# Patient Record
Sex: MALE | Race: WHITE | NOT HISPANIC OR LATINO | Employment: STUDENT | URBAN - METROPOLITAN AREA
[De-identification: names, ages, dates, MRNs, and addresses within clinical notes are randomized per-mention and may not be internally consistent; named-entity substitution may affect disease eponyms.]

---
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
## 2025-01-14 ENCOUNTER — CLINICAL SUPPORT (OUTPATIENT)
Dept: REHABILITATION | Facility: HOSPITAL | Age: 20
End: 2025-01-14
Payer: COMMERCIAL

## 2025-01-14 DIAGNOSIS — M25.311 DYSKINESIS OF RIGHT SCAPULA: ICD-10-CM

## 2025-01-14 DIAGNOSIS — M62.81 MUSCLE WEAKNESS OF RIGHT UPPER EXTREMITY: Primary | ICD-10-CM

## 2025-01-14 DIAGNOSIS — G89.29 CHRONIC ELBOW PAIN, RIGHT: ICD-10-CM

## 2025-01-14 DIAGNOSIS — M25.521 CHRONIC ELBOW PAIN, RIGHT: ICD-10-CM

## 2025-01-14 PROCEDURE — 97112 NEUROMUSCULAR REEDUCATION: CPT

## 2025-01-14 PROCEDURE — 97110 THERAPEUTIC EXERCISES: CPT

## 2025-01-14 NOTE — PROGRESS NOTES
[facility]   Physical Therapy Treatment Note      Name: Ammon Jones  Clinic Number: [id number]    Therapy Diagnosis:   Encounter Diagnoses   Name Primary?    Muscle weakness of right upper extremity Yes    Dyskinesis of right scapula     Chronic elbow pain, right      Physician: Sarmad Prince DO    Visit Date: 1/14/2025    Physician Orders: PT Eval and Treat   Medical Diagnosis from Referral: M25.521,G89.29 (ICD-10-CM) - Chronic elbow pain, right   Evaluation Date: 9/30/2024  Authorization Period Expiration: 9/23/25  Plan of Care Expiration: 12/31/24  Progress Note Due: 11/15/24  Visit # / Visits authorized: 1 / 20 (21 this episode)   FOTO: 1/N     Precautions: Standard     Time In: 1500  Time Out: 1559  Total Billable Time: 54 minutes    Subjective     Pt reports: Bullpens have been no problem.     He was compliant with home exercise program.  Response to previous treatment: Ongoing  Functional change: Ongoing    Pain: Not verbalized /10  Location: right arms and elbow      Objective      Objective Measures updated at progress report unless specified.     1/14/25       Strength: (Lbs via HHD)    Right Left   Scaption 28.4 24.3   Shoulder ER at side 36.2 31.6   Shoulder ER at 90-90 38.3 30.8   Shoulder IR at side  34.9 37.0   Shoulder IR 90-90 29.5 28.9     R MT/LT MMT: 4/5  R SA MMT: 5/5    90-90 3-Jaw Madhu   R: 73 lbs  L: 69 lbs    Treatment     Ammon received the treatments listed below:        therapeutic exercises to develop strength, endurance, ROM, and flexibility for 38 minutes including:  Rower Lvl 7 x500m  Prisoner's warm up 3# x3 rounds     Updated objective measures with results as above.     Not performed:  90 deg flex eccentric hammer curl 10# 3x12   Bat pang eccentric pronation 3x10 10#   90 deg abd biceps curl 10# 3x8   Supine skull crushers 15# 3x8  Thoracic mobility series x2 rounds   Bat pang supine biceps curls 10# 3x12  1080 ER 4 KG / 6 Kg 3x12   1080  "90-90 ER 3 KG conc / 5 Kg ecc 3x10         manual therapy techniques: were applied for 00 minutes, including:      neuromuscular re-education activities to improve: Coordination, Kinesthetic, Sense, Proprioception, Posture, and Motor Control for 16 minutes. The following activities were included:  90-90 Step Back OTB 3x8 ea.   Body Blade 90-90 3x to light burn     Not performed:  D2 Pro/Sup Bat weight 2x8 ea.   MT Re-ed 5# 3x8 ea.   LT Re-ed 3x8 ea 5#  1080 D2 3Kg / 5Kg  3x10   1080 MT re-ed 4 Kg / 6 Kg 3x10  2Kg Weighted ball decel catch 3x10  1080 high row conc ER 2Kg / Eccentric 6 Kg 3x8   Hinge lat pull 20# ANCOR 3x12  1080 LT Tilt 2Kg 4Kg 3x8      therapeutic activities to improve functional performance for 00  minutes, including:        Not performed:    20" Step up w/10# DB kaden  3x8 ea.   X2 10# plate 12" step up 3x8 ea.  Landmine Row 90# 3x8   SL RDL Landmine 70# 3x8 ea.   Split stance MB de-rotation toss 3x10 ea. 20#  Heavy 1080 Ecc Row 15 Kg 20Kg 3x10   SL RDL w/3D strap 15# 3x10 20#  15# DB Head carry kaden  LOT <-> 3x      Patient Education and Home Exercises       Education provided:   - Biomechanics and principles of proximal stability for distal control      Written Home Exercises Provided: yes. Exercises were reviewed and Ammon was able to demonstrate them prior to the end of the session.  Ammon demonstrated good  understanding of the education provided. See EMR under Patient Instructions for exercises provided during therapy sessions    Assessment     Updated objective measures performed with results as above with good maintenance of shoulder strength with improvement in IR strength. He is continuing his bullpen program without adverse response. Will follow up in 2 weeks and follow till beginning to season to decrease risk for any setbacks.       Ammon Is progressing well towards his goals.   Pt prognosis is Good.     Pt will continue to benefit from skilled outpatient physical therapy to " address the deficits listed in the problem list box on initial evaluation, provide pt/family education and to maximize pt's level of independence in the home and community environment.     Pt's spiritual, cultural and educational needs considered and pt agreeable to plan of care and goals.     Anticipated barriers to physical therapy: Need for appointments around school and sport    Goals:     Short Term Goals: 2-4 weeks  1. Pt will be compliant with HEP 50% of prescribed amount. (Met)  2. The pt to demo improvement in joint mobility of the right elbow to symmetrical to uninvolved anjelica (Met)  3.  Pt will demo improvement in MT and LT strength via MMT of 1/3 grade (Met)  4. Pt will tolerate a tossing program out to 90 feet without pain or adverse response (met)     Long Term Goals: 16 weeks   Pt will be compliant with % of prescribed amount.   Pt will improve RC strength to 20% BW and 110% LSI of opposite side (Met)  Pt will improve periscapular strength by a full grade via MMT (Met)  Pt will improve 3-jaw kaden  strength to 110% LSI (Met)  Pt will progress through a return to pitching program ready for initiation of spring training (Progressing)  The pt will report full participation in ADLs and IADLs without restrictions related to R elbow/forearm.     Plan     Outpatient Physical Therapy 2 times weekly for 16 weeks to include the following interventions: Electrical Stimulation , Manual Therapy, Moist Heat/ Ice, Neuromuscular Re-ed, Patient Education, Self Care, Therapeutic Activities, Therapeutic Exercise, and Dry Needling.     Atif Nichole, PT , DPT  Board Certified in Sports Physical Therapy

## 2025-01-15 ENCOUNTER — ATHLETIC TRAINING SESSION (OUTPATIENT)
Dept: SPORTS MEDICINE | Facility: CLINIC | Age: 20
End: 2025-01-15
Payer: COMMERCIAL

## 2025-01-15 DIAGNOSIS — M75.21 BICEPS TENDINITIS, RIGHT: Primary | ICD-10-CM

## 2025-01-15 DIAGNOSIS — M54.50 ACUTE BILATERAL LOW BACK PAIN WITHOUT SCIATICA: Primary | ICD-10-CM

## 2025-01-15 NOTE — PROGRESS NOTES
Reason for Encounter Follow-Up    Subjective:       Chief Complaint: Ammon Jones is a 19 y.o. male student at [employer]) who had concerns including Pain of the Right Elbow.    Ammon is a  at [employer]. He started having elbow pain towards the end of the summer and it worsened in the fall season. He has been going to formal PT and doing rehab in the [facility].     Handedness: right-handed  Sport played: baseball      Level: college      Position:pitcher      Pain    Injury        ROS              Objective:       General: Ammon is well-developed, well-nourished, appears stated age, in no acute distress, alert and oriented to time, place and person.     AT Session          Assessment:     Status: L - Rehabilitation    Date Seen:  12/08/2024-12/14/2024    Date of Injury:  9/14/2024    Date Out:  9/20/2024    Date Cleared:  TBD        Treatment/Rehab/Maintenance:     Ammon completed therapeutic exercises to develop strength:    Date: 12/10/2024    Exercises  Sets x Reps  Weight   UE nerve flosses 2 x 10     Wrist stretches 2 x 30s     [place] ER 3 x 10  Green theraband    Chicken wings 3 x 10  Orange heavy resistance band   Band butterflies  3 x 10  Orange heavy resistance band    Clock taps  3 x 4 Blue theraband    Finger flexion  4 x 12 Green web   Finger extension  4 x 12  Green web    Cat cows 2 x 10     Windshield wipers  2 x 10     Pelvic tilts  2 x 10     Alternating camels  2 x 10                            Plan:       1. Continue rehab through out fall and winter  2. Physician Referral: yes  3. ED Referral:no  4. Parent/Guardian Notified: Yes Parent Name: Aimee Jones  Date 9/20/2024  Time: 4:00  Method of Communication: Face to face  5. All questions were answered, ath. will contact me for questions or concerns in  the interim.  6.         Eligible to use School Insurance: Yes

## 2025-01-15 NOTE — PROGRESS NOTES
Reason for Encounter Follow-Up    Subjective:       Chief Complaint: Ammon Jones is a 19 y.o. male student at [employer]) who had concerns including Pain of the Lower Back.    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 We are doing treatment and exercises to calm his back down.     Handedness: right-handed  Sport played: baseball      Level: college      Position:pitcher      Pain      ROS              Objective:       General: Ammon is well-developed, well-nourished, appears stated age, in no acute distress, alert and oriented to time, place and person.     AT Session          Assessment:     Status: F - Full Participation    Date Seen:  01/05/2025-01/11/2025    Date of Injury:  01/06/2025    Date Out:  NA    Date Cleared:  NA        Treatment/Rehab/Maintenance:   Ammon completed therapeutic exercises to develop posture and core stabilization:    Date: 01/10/2025    Exercises  Sets x Reps  Weight   Cat cows  2 x 10     Baby cobras  2 x 10     Courtney pose left and right  2 x 10     Seated twist  2 x 30s     Seated lateral reach  2 x 30s                                      Ammon completed therapeutic exercises to develop posture and core stabilization:    Date: 01/09/2025    Exercises  Sets x Reps  Weight   Cat cows  2 x 10     Baby cobras  2 x 10     Courtney pose left and right  2 x 10     Seated twist  2 x 30s     Seated lateral reach  2 x 30s                                      Ammon completed therapeutic exercises to develop posture and core stabilization:    Date: 01/07/2025    Exercises  Sets x Reps  Weight   Cat cows  2 x 10     Baby cobras  2 x 10     Courtney pose left and right  2 x 10     Seated twist  2 x 30s     Seated lateral reach  2 x 30s                                  Modality: Compex + heat   Date 01/07/2025, Pain Relief, Body Location L back, and Duration 20 min    Modality: Cupping  Date 01/07/2205, Body  Location Low back, and Duration 5 min        Plan:       1. Do treatment and exercises as needed until pain is relived and he feels better   2. Physician Referral: no  3. ED Referral:no  4. Parent/Guardian Notified: No  5. All questions were answered, ath. will contact me for questions or concerns in  the interim.  6.         Eligible to use School Insurance: Yes

## 2025-01-15 NOTE — PROGRESS NOTES
Reason for Encounter Follow-Up    Subjective:       Chief Complaint: Ammon Jones is a 19 y.o. male student at [employer]) who had concerns including Pain of the Right Elbow.    Ammon is a  at [employer]. He started having elbow pain towards the end of the summer and it worsened in the fall season. He has been going to formal PT and doing rehab in the [facility].     Handedness: right-handed  Sport played: baseball      Level: college      Position:pitcher      Pain    Injury        ROS              Objective:       General: Ammon is well-developed, well-nourished, appears stated age, in no acute distress, alert and oriented to time, place and person.     AT Session          Assessment:     Status: L - Rehabilitation    Date Seen:  01/05/2025-01/11/2025    Date of Injury:  9/14/2024    Date Out:  9/20/2024    Date Cleared:  TBD        Treatment/Rehab/Maintenance:   Ammon completed therapeutic exercises to develop strength:    Date: 01/10/2025    Exercises  Sets x Reps  Weight   Nerve flosses  2 x 10     Table wrist stretches  2 x 10     KB halos  3 x 10  10 lbs    Band row to ER and press up 3 x 10  Blue theraband    Band IR to press out  3 x 10  Blue theraband                                          Ammon completed therapeutic exercises to develop strength:    Date: 01/09/2024    Exercises  Sets x Reps  Weight   Nerve flosses  2 x 10     Table wrist stretches  2 x 10     KB halos  3 x 10  10 lbs    Band row to ER and press up 3 x 10  Blue theraband    Band IR to press out  3 x 10  Blue theraband                                  Modality: Combo  Date 01/09/2025, Ultrasound intensity 1.8, E-Stem Frequency 12, and Duration 7 min                Plan:       1. Continue rehab daily as season starts back  2. Physician Referral: yes  3. ED Referral:no  4. Parent/Guardian Notified: Yes Parent Name: Aimee Jones  Date 9/20/2024  Time: 4:00  Method of Communication: Face to face  5. All questions  were answered, ath. will contact me for questions or concerns in  the interim.  6.         Eligible to use School Insurance: Yes

## 2025-01-15 NOTE — PROGRESS NOTES
Reason for Encounter Follow-Up    Subjective:       Chief Complaint: Ammon Jones is a 19 y.o. male student at [employer]) who had concerns including Pain of the Right Elbow.    Ammon is a  at [employer]. He started having elbow pain towards the end of the summer and it worsened in the fall season. He has been going to formal PT and doing rehab in the [facility].     Handedness: right-handed  Sport played: baseball      Level: college      Position:pitcher      Pain    Injury        ROS              Objective:       General: Ammon is well-developed, well-nourished, appears stated age, in no acute distress, alert and oriented to time, place and person.     AT Session          Assessment:     Status: L - Rehabilitation    Date Seen:  12/15/2024-12/21/2024    Date of Injury:  9/14/2024    Date Out:  9/20/2024    Date Cleared:  TBD        Treatment/Rehab/Maintenance:   Ammon completed therapeutic exercises to develop strength:    Date: 12/18/2024    Exercises  Sets x Reps  Weight   Nerve flosses  2 x 10     Table wrist stretches  2 x 10     KB halos  3 x 10  10 lbs    Band row to ER and press up 3 x 10  Blue theraband    Band IR to press out  3 x 10  Blue theraband                                  Modality: Massage  Date 12/18/2024, Body location R arm, and Duration 10 min          Ammon completed therapeutic exercises to develop strength:    Date: 12/17/2024    Exercises  Sets x Reps  Weight   Nerve flosses  2 x 10     Table wrist stretches  2 x 10     KB halos  3 x 10  10 lbs    Band row to ER and press up 3 x 10  Blue theraband    Band IR to press out  3 x 10  Blue theraband    Pelvic tilts  3 x 10     Bridges w/ squeeze  3 x 10     Bridge marches  3 x 10     Table top taps  3 x 10               Modality: Cupping  Date 12/17/2024, Body Location low back, and Duration 5 min            Plan:       1. Continue rehab through out fall and winter  2. Physician Referral: yes  3. ED  Referral:no  4. Parent/Guardian Notified: Yes Parent Name: Aimee Jones  Date 9/20/2024  Time: 4:00  Method of Communication: Face to face  5. All questions were answered, ath. will contact me for questions or concerns in  the interim.  6.         Eligible to use School Insurance: Yes

## 2025-01-15 NOTE — PROGRESS NOTES
Reason for Encounter Follow-Up    Subjective:       Chief Complaint: Ammon Jones is a 19 y.o. male student at [employer]) who had concerns including Pain of the Right Elbow.    Ammon is a  at [employer]. He started having elbow pain towards the end of the summer and it worsened in the fall season. He has been going to formal PT and doing rehab in the [facility].     Handedness: right-handed  Sport played: baseball      Level: college      Position:pitcher      Pain    Injury        ROS              Objective:       General: Ammon is well-developed, well-nourished, appears stated age, in no acute distress, alert and oriented to time, place and person.     AT Session          Assessment:     Status: L - Rehabilitation    Date Seen:  01/12/2025-01/18/2025    Date of Injury:  9/14/2024    Date Out:  9/20/2024    Date Cleared:  TBD        Treatment/Rehab/Maintenance:   Ammon completed therapeutic exercises to develop strength:    Date: 01/18/2025    Exercises  Sets x Reps  Weight   UE nerve flosses  2 x 12    Wrist stretches (flexion and extension)  3 x 30s     Pronation/supination  4 x 12  8 lbs    Radial/ulnar deviation  4 x 12  8 lbs    D1 flexion  4 x 12  Blue theraband    D2 extension  4 x 12  Blue theraband    D1 extension  4 x 12  Blue theraband    D2 flexion  4 x 12  Blue theraband    Chest supported I, Y, T  4 x 8  3 lbs                    Ammon completed therapeutic exercises to develop strength:    Date: 01/17/2025    Exercises  Sets x Reps  Weight   Nerve flosses  2 x 10     Table wrist stretches  2 x 10     KB halos  3 x 10  10 lbs    Band row to ER and press up 3 x 10  Blue theraband    Band IR to press out  3 x 10  Blue theraband                                          Ammon completed therapeutic exercises to develop strength:    Date: 01/15/2024    Exercises  Sets x Reps  Weight   Nerve flosses  2 x 10     Table wrist stretches  2 x 10     KB halos  3 x 10  10 lbs    Band row  to ER and press up 3 x 10  Blue theraband    Band IR to press out  3 x 10  Blue theraband                                                Plan:       1. Continue rehab daily as season starts back  2. Physician Referral: yes  3. ED Referral:no  4. Parent/Guardian Notified: Yes Parent Name: Aimee Jones  Date 9/20/2024  Time: 4:00  Method of Communication: Face to face  5. All questions were answered, ath. will contact me for questions or concerns in  the interim.  6.         Eligible to use School Insurance: Yes

## 2025-01-16 NOTE — PLAN OF CARE
[facility]   Physical Therapy Treatment Note      Name: Ammon Jones  Clinic Number: [id number]    Therapy Diagnosis:   Encounter Diagnoses   Name Primary?    Muscle weakness of right upper extremity Yes    Dyskinesis of right scapula     Chronic elbow pain, right      Physician: Sarmad Prince DO    Visit Date: 1/14/2025    Physician Orders: PT Eval and Treat   Medical Diagnosis from Referral: M25.521,G89.29 (ICD-10-CM) - Chronic elbow pain, right   Evaluation Date: 9/30/2024  Authorization Period Expiration: 9/23/25  Plan of Care Expiration: 12/31/24  Progress Note Due: 11/15/24  Visit # / Visits authorized: 1 / 20 (21 this episode)   FOTO: 1/N     Precautions: Standard     Time In: 1500  Time Out: 1559  Total Billable Time: 54 minutes    Subjective     Pt reports: Bullpens have been no problem.     He was compliant with home exercise program.  Response to previous treatment: Ongoing  Functional change: Ongoing    Pain: Not verbalized /10  Location: right arms and elbow      Objective      Objective Measures updated at progress report unless specified.     1/14/25       Strength: (Lbs via HHD)    Right Left   Scaption 28.4 24.3   Shoulder ER at side 36.2 31.6   Shoulder ER at 90-90 38.3 30.8   Shoulder IR at side  34.9 37.0   Shoulder IR 90-90 29.5 28.9     R MT/LT MMT: 4/5  R SA MMT: 5/5    90-90 3-Jaw Madhu   R: 73 lbs  L: 69 lbs    Treatment     Ammon received the treatments listed below:        therapeutic exercises to develop strength, endurance, ROM, and flexibility for 38 minutes including:  Rower Lvl 7 x500m  Prisoner's warm up 3# x3 rounds     Updated objective measures with results as above.     Not performed:  90 deg flex eccentric hammer curl 10# 3x12   Bat pang eccentric pronation 3x10 10#   90 deg abd biceps curl 10# 3x8   Supine skull crushers 15# 3x8  Thoracic mobility series x2 rounds   Bat pang supine biceps curls 10# 3x12  1080 ER 4 KG / 6 Kg 3x12   1080  "90-90 ER 3 KG conc / 5 Kg ecc 3x10         manual therapy techniques: were applied for 00 minutes, including:      neuromuscular re-education activities to improve: Coordination, Kinesthetic, Sense, Proprioception, Posture, and Motor Control for 16 minutes. The following activities were included:  90-90 Step Back OTB 3x8 ea.   Body Blade 90-90 3x to light burn     Not performed:  D2 Pro/Sup Bat weight 2x8 ea.   MT Re-ed 5# 3x8 ea.   LT Re-ed 3x8 ea 5#  1080 D2 3Kg / 5Kg  3x10   1080 MT re-ed 4 Kg / 6 Kg 3x10  2Kg Weighted ball decel catch 3x10  1080 high row conc ER 2Kg / Eccentric 6 Kg 3x8   Hinge lat pull 20# ANCOR 3x12  1080 LT Tilt 2Kg 4Kg 3x8      therapeutic activities to improve functional performance for 00  minutes, including:        Not performed:    20" Step up w/10# DB kaden  3x8 ea.   X2 10# plate 12" step up 3x8 ea.  Landmine Row 90# 3x8   SL RDL Landmine 70# 3x8 ea.   Split stance MB de-rotation toss 3x10 ea. 20#  Heavy 1080 Ecc Row 15 Kg 20Kg 3x10   SL RDL w/3D strap 15# 3x10 20#  15# DB Head carry kaden  LOT <-> 3x      Patient Education and Home Exercises       Education provided:   - Biomechanics and principles of proximal stability for distal control      Written Home Exercises Provided: yes. Exercises were reviewed and Ammon was able to demonstrate them prior to the end of the session.  Ammon demonstrated good  understanding of the education provided. See EMR under Patient Instructions for exercises provided during therapy sessions    Assessment     Updated objective measures performed with results as above with good maintenance of shoulder strength with improvement in IR strength. He is continuing his bullpen program without adverse response. Will follow up in 2 weeks and follow till beginning to season to decrease risk for any setbacks.       Ammon Is progressing well towards his goals.   Pt prognosis is Good.     Pt will continue to benefit from skilled outpatient physical therapy to " address the deficits listed in the problem list box on initial evaluation, provide pt/family education and to maximize pt's level of independence in the home and community environment.     Pt's spiritual, cultural and educational needs considered and pt agreeable to plan of care and goals.     Anticipated barriers to physical therapy: Need for appointments around school and sport    Goals:     Short Term Goals: 2-4 weeks  1. Pt will be compliant with HEP 50% of prescribed amount. (Met)  2. The pt to demo improvement in joint mobility of the right elbow to symmetrical to uninvolved anjelica (Met)  3.  Pt will demo improvement in MT and LT strength via MMT of 1/3 grade (Met)  4. Pt will tolerate a tossing program out to 90 feet without pain or adverse response (met)     Long Term Goals: 16 weeks   Pt will be compliant with % of prescribed amount.   Pt will improve RC strength to 20% BW and 110% LSI of opposite side (Met)  Pt will improve periscapular strength by a full grade via MMT (Met)  Pt will improve 3-jaw kaden  strength to 110% LSI (Met)  Pt will progress through a return to pitching program ready for initiation of spring training (Progressing)  The pt will report full participation in ADLs and IADLs without restrictions related to R elbow/forearm.     Plan     Outpatient Physical Therapy 2 times weekly for 16 weeks to include the following interventions: Electrical Stimulation , Manual Therapy, Moist Heat/ Ice, Neuromuscular Re-ed, Patient Education, Self Care, Therapeutic Activities, Therapeutic Exercise, and Dry Needling.     Atif Nichole, PT , DPT  Board Certified in Sports Physical Therapy

## 2025-01-28 ENCOUNTER — ATHLETIC TRAINING SESSION (OUTPATIENT)
Dept: SPORTS MEDICINE | Facility: CLINIC | Age: 20
End: 2025-01-28
Payer: COMMERCIAL

## 2025-01-28 DIAGNOSIS — M25.561 ACUTE PAIN OF RIGHT KNEE: Primary | ICD-10-CM

## 2025-01-28 NOTE — PROGRESS NOTES
"Reason for Encounter New Injury    Subjective:       Chief Complaint: Ammon Jones is a 19 y.o. male student at [employer]) who had concerns including Pain of the Right Knee.    Ammon reported to the [facility] on 1/25/2025 complaining of lateral R knee pain. He states not knowing why is hurting, he cannot think of a mechanism of injury. Ammon says his knee is extremely painful and hurts to walk. He stated "This is the worst thing I've ever felt". Visually there is no bruising, swelling, or obvious deformity. Ammon does not have a history of knee pain. He is a tall and slider build, but did not have any knee issues rowing up other than a Baker's cyst. There is no pain in the anterior or medial aspect of his knee. All pain is localized to the lateral aspect of his R knee.     Handedness: right-handed  Sport played: baseball      Level: college      Position:pitcher      Pain      ROS              Objective:       General: Ammon is well-developed, well-nourished, appears stated age, in no acute distress, alert and oriented to time, place and person.         General Musculoskeletal Exam   Gait: antalgic       Right Knee Exam     Inspection   Erythema: absent  Scars: absent  Swelling: absent  Effusion: absent  Deformity: absent  Bruising: absent    Tenderness   The patient is tender to palpation of the lateral joint line, LCL, condyle and iliotibial band.    Crepitus   The patient has crepitus of the ITB.    Range of Motion   Right knee extension grade: Painful with full extension.   Right knee flexion: Painful with full flexion.     Tests   Meniscus   Garth:   Lateral - positive  Ligament Examination   Lachman: normal (-1 to 2mm)   PCL-Posterior Drawer: normal (0 to 2mm)     MCL - Valgus: normal (0 to 2mm)  LCL - Varus: normal  Posterior Sag Test: negative  Posterolateral Corner: unstable (>15 degrees difference)  Patella   Patellar apprehension: negative  Passive Patellar Tilt: neutral  Patellar " Tracking: normal    Other   Sensation: normal  Apley Grind Test: positive    Left Knee Exam   Left knee exam is normal.    Right Hip Exam     Inspection   Quadriceps Atrophy:  Negative      Muscle Strength   Right Lower Extremity   Quadriceps:  4/5 (Painful)   Hamstring:  3/5 (Painful)             Assessment:     Status: AT - Cleared to Exert    Date Seen:  01/25/2025    Date of Injury:  01/24/2025    Date Out:  01/25/2025    Date Cleared:  TBD        Treatment/Rehab/Maintenance:   Modality: Game ready  Date 01/25/2025, R knee, and Duration 15 min          Modality: Compex  Date 01/25/2025, Pain Relief, Body Location R lateral knee, and Duration 20 min          Plan:       1. As of 01/25/2025: We discussed a regiment of ibuprofen every 6 hours along with interval icing at night, 1 hour, 15 minutes on 15 minutes off. This was the plan for Saturday night- Tuesday morning. Tuesday morning when I returned to work I would re-evaluate him and we would decide if he needed to be seen. On 01/26/2025 Ammon texted me saying he was in more pain, he was having to kick his leg to walk, and stairs were bothering his knee. We discussed if it would be appropriate to get him scheduled with Dr. Waldron and agreed that was the correct decision. I also told him to continue with ibuprofen and ice as well as Quad squeezes 3 x 10, heel slides w towel 3 x 10, and leg extensions (through comfortable ROM) 3 x 10 twice a day.   2. Physician Referral: yes  3. ED Referral:no  4. Parent/Guardian Notified: Yes Parent Name: Aimee Jones  Date 01/26/2025  Time: 11:00 am  Method of Communication: Phone Call  5. All questions were answered, ath. will contact me for questions or concerns in  the interim.  6.         Eligible to use School Insurance: No, not a school related injury

## 2025-01-29 ENCOUNTER — ATHLETIC TRAINING SESSION (OUTPATIENT)
Dept: SPORTS MEDICINE | Facility: CLINIC | Age: 20
End: 2025-01-29
Payer: COMMERCIAL

## 2025-01-29 DIAGNOSIS — Z00.00 HEALTHCARE MAINTENANCE: Primary | ICD-10-CM

## 2025-01-29 NOTE — PROGRESS NOTES
Reason for Encounter N/A    Subjective:       Chief Complaint: Ammon Jones is a 19 y.o. male student at [employer]) who had concerns including Health Maintenance of the Right Upper Arm.    Ammon is a  at [employer]. He was being seen for bicep tendonitis of his R shoulder. He also has a hx scapular diskinisis of his R arm. Ammon is coming to the end of his formal PT and his arm is feeling much better. We are now moving to a maintenance program to keep him healthy for the season.     Handedness: right-handed  Sport played: baseball      Level: college      Position:pitcher          ROS              Objective:       General: Ammon is well-developed, well-nourished, appears stated age, in no acute distress, alert and oriented to time, place and person.     AT Session          Assessment:     Status: F - Full Participation    Date Seen:  01/19/2025-01/25/2025    Date of Injury:  NA    Date Out:  NA    Date Cleared:  NA        Treatment/Rehab/Maintenance:       Ammon completed therapeutic exercises to develop strength:    Date: 01/24/2025    Exercises  Sets x Reps  Weight   UE nerve flosses  2 x 12     Wrist stretches  3 x 30s     Lateral band presses 3 x 12  Blue heavy resistance band    Band snow angles  4 x 12     Scap ups  4 x 12     Plank saws  4 x 12     Band serratus scoops  4 x 12  Blue theraband                          Modality: Combo  Date 01/24/2025, Ultrasound intensity 1.8 , Ultrasound Hertz 1 MHz, E-Stem Frequency 100 Hz, and Duration 7 min          Plan:       1. Continue daily arm care  2. Physician Referral: no  3. ED Referral:no  4. Parent/Guardian Notified: No  5. All questions were answered, ath. will contact me for questions or concerns in  the interim.  6.         Eligible to use School Insurance: Yes

## 2025-01-29 NOTE — PROGRESS NOTES
"CC: Right knee pain    19 y.o. Male who returns with a new issue of right knee pain. 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  He is accompanied by his father today.  He reports a right knee injury while standing in the shower this past Friday January 24th.  He reports that his "knee just gave out" while washing his hair.  This was acutely painful.  He denies any pre-existing issues.  No antecedent trauma.  No specific fall.  No twisting mechanism.  Since then he has fairly significant pain with clicking and popping and mechanical symptoms.  This significantly concerns him.  He has pain upwards of "12/10" in severity.  He is walking with a limp.  Again no prior problems.  Nothing unusual in practice leading up to that point.  Treatment has included rest, self-directed therapy, treatment by the , oral medication, activity modifications.  No ipsilateral groin or hip pain.  No back or radicular symptoms.  No numbness or tingling.    REVIEW OF SYSTEMS:   Constitution: Negative. Negative for chills, fever and night sweats.    Hematologic/Lymphatic: Negative for bleeding problem. Does not bruise/bleed easily.   Skin: Negative for dry skin, itching and rash.   Musculoskeletal: Negative for falls. Positive for right knee pain and muscle weakness.     All other review of symptoms were reviewed and found to be noncontributory.     PAST MEDICAL HISTORY:   No past medical history on file.  PAST SURGICAL HISTORY:   Past Surgical History:   Procedure Laterality Date    ADENOIDECTOMY Bilateral     TYMPANOSTOMY TUBE PLACEMENT Bilateral      FAMILY HISTORY:   Family History   Problem Relation Name Age of Onset    No Known Problems Mother      No Known Problems Father       SOCIAL HISTORY:   Social History     Socioeconomic History    Marital status: Single   Tobacco Use    Smoking status: Never    Smokeless tobacco: Never   Substance and Sexual Activity    Alcohol use: No     Alcohol/week: 0.0 standard drinks of alcohol    Drug use: No " "    Social Drivers of Health     Financial Resource Strain: Patient Declined (9/22/2024)    Overall Financial Resource Strain (CARDIA)     Difficulty of Paying Living Expenses: Patient declined   Food Insecurity: Patient Declined (9/22/2024)    Hunger Vital Sign     Worried About Running Out of Food in the Last Year: Patient declined     Ran Out of Food in the Last Year: Patient declined   Transportation Needs: No Transportation Needs (7/29/2021)    [facility]    PRAPARE - Transportation     Lack of Transportation (Medical): No     Lack of Transportation (Non-Medical): No   Physical Activity: Sufficiently Active (9/22/2024)    Exercise Vital Sign     Days of Exercise per Week: 5 days     Minutes of Exercise per Session: 120 min   Stress: No Stress Concern Present (9/22/2024)    [employer] - Occupational Stress Questionnaire     Feeling of Stress : Not at all   Housing Stability: Unknown (9/22/2024)    Housing Stability Vital Sign     Unable to Pay for Housing in the Last Year: Patient declined     MEDICATIONS:     Current Outpatient Medications:     ibuprofen (ADVIL,MOTRIN) 200 MG tablet, Take 200 mg by mouth every 6 (six) hours as needed for Pain., Disp: , Rfl:     celecoxib (CELEBREX) 200 MG capsule, Take 1 capsule (200 mg total) by mouth once daily., Disp: 30 capsule, Rfl: 1    dextromethorphan-guaiFENesin  mg (MUCINEX DM)  mg per 12 hr tablet, Take 1 tablet by mouth every 12 (twelve) hours. (Patient not taking: Reported on 9/30/2024), Disp: , Rfl:   ALLERGIES:   Review of patient's allergies indicates:  No Known Allergies     PHYSICAL EXAMINATION:  Ht 6' 5" (1.956 m)   Wt 86.2 kg (190 lb)   BMI 22.53 kg/m²   General: Well-developed well-nourished 19 y.o. malein no acute distress   Cardiovascular: Regular rhythm by palpation of distal pulse, normal color and temperature, no concerning varicosities on symptomatic side   Lungs: No labored " breathing or wheezing appreciated   Neuro: Alert and oriented ×3   Psychiatric: well oriented to person, place and time, demonstrates normal mood and affect   Skin: No rashes, lesions or ulcers, normal temperature, turgor, and texture on involved extremity    Ortho/SPM Exam  Examination of the right knee demonstrates intact extensor mechanism.  Perhaps trace effusion.  There is some soft tissue swelling at the level of the lateral joint line and lateral knee.  This area is exquisitely tender for the patient.  The patient guards on exam.  Laterally based pain with forced flexion and extension.  Full active and passive extension.  Active assisted flexion to 120°.  Positive Garth's test for pain laterally.  Patient is guarding.  Negative posterior drawer.  Negative Lachman.  Stable to varus and valgus stress.  I do not appreciate any increased opening to varus stress at 30° compared to the contralateral side.  I am able to bring him into the figure 4 position but he does have pain laterally of the LCL and lateral joint line reason.  Negative prone dial test at 30 and 90 °.    IMAGING:  X-rays including standing, weight bearing AP and flexion bilateral knees, RIGHT knee lateral and sunrise views ordered and images reviewed by me show:    No acute abnormality    ASSESSMENT:      ICD-10-CM ICD-9-CM   1. Acute internal derangement of right knee  M23.91 717.9   2. Right knee pain  M25.561 719.46       PLAN:     Findings discussed with the patient.  He reports an injury to the right knee with the acute onset pain over the lateral joint line.  History and exam findings are concerning for a possible lateral meniscus tear.  The patient has significant pain on exam and ambulates with a limp.  This is the beginning of his baseball season so he naturally is quite concerned.  I have recommended MRI for further assessment.  I will see him back virtually afterwards to discuss results and treatment options.  Hold out of baseball  activities for now.  No running or high impact activity.  No deep squats.  Prescription given for Celebrex.    Procedures

## 2025-01-30 ENCOUNTER — CLINICAL SUPPORT (OUTPATIENT)
Dept: REHABILITATION | Facility: HOSPITAL | Age: 20
End: 2025-01-30
Payer: COMMERCIAL

## 2025-01-30 ENCOUNTER — HOSPITAL ENCOUNTER (OUTPATIENT)
Dept: RADIOLOGY | Facility: HOSPITAL | Age: 20
Discharge: HOME | End: 2025-01-30
Attending: ORTHOPAEDIC SURGERY
Payer: COMMERCIAL

## 2025-01-30 ENCOUNTER — OFFICE VISIT (OUTPATIENT)
Dept: SPORTS MEDICINE | Facility: CLINIC | Age: 20
End: 2025-01-30
Payer: COMMERCIAL

## 2025-01-30 VITALS — BODY MASS INDEX: 22.43 KG/M2 | WEIGHT: 190 LBS | HEIGHT: 77 IN

## 2025-01-30 DIAGNOSIS — M23.91 ACUTE INTERNAL DERANGEMENT OF RIGHT KNEE: Primary | ICD-10-CM

## 2025-01-30 DIAGNOSIS — M62.81 MUSCLE WEAKNESS OF RIGHT UPPER EXTREMITY: Primary | ICD-10-CM

## 2025-01-30 DIAGNOSIS — M25.311 DYSKINESIS OF RIGHT SCAPULA: ICD-10-CM

## 2025-01-30 DIAGNOSIS — M25.561 RIGHT KNEE PAIN: ICD-10-CM

## 2025-01-30 DIAGNOSIS — M25.521 CHRONIC ELBOW PAIN, RIGHT: ICD-10-CM

## 2025-01-30 DIAGNOSIS — G89.29 CHRONIC ELBOW PAIN, RIGHT: ICD-10-CM

## 2025-01-30 PROCEDURE — 97110 THERAPEUTIC EXERCISES: CPT

## 2025-01-30 PROCEDURE — 73562 X-RAY EXAM OF KNEE 3: CPT | Mod: 26,59,LT, | Performed by: RADIOLOGY

## 2025-01-30 PROCEDURE — 99214 OFFICE O/P EST MOD 30 MIN: CPT | Mod: S$GLB,,, | Performed by: ORTHOPAEDIC SURGERY

## 2025-01-30 PROCEDURE — 99999 PR PBB SHADOW E&M-EST. PATIENT-LVL III: CPT | Mod: PBBFAC,,, | Performed by: ORTHOPAEDIC SURGERY

## 2025-01-30 PROCEDURE — 73564 X-RAY EXAM KNEE 4 OR MORE: CPT | Mod: 26,RT,, | Performed by: RADIOLOGY

## 2025-01-30 PROCEDURE — 97530 THERAPEUTIC ACTIVITIES: CPT

## 2025-01-30 PROCEDURE — 97112 NEUROMUSCULAR REEDUCATION: CPT

## 2025-01-30 PROCEDURE — 3008F BODY MASS INDEX DOCD: CPT | Mod: CPTII,S$GLB,, | Performed by: ORTHOPAEDIC SURGERY

## 2025-01-30 PROCEDURE — 73564 X-RAY EXAM KNEE 4 OR MORE: CPT | Mod: TC,RT

## 2025-01-30 RX ORDER — IBUPROFEN 200 MG
200 TABLET ORAL EVERY 6 HOURS PRN
COMMUNITY

## 2025-01-30 RX ORDER — CELECOXIB 200 MG/1
200 CAPSULE ORAL DAILY
Qty: 30 CAPSULE | Refills: 1 | Status: SHIPPED | OUTPATIENT
Start: 2025-01-30

## 2025-01-30 NOTE — PROGRESS NOTES
[facility]   Physical Therapy Treatment Note      Name: Ammon Jones  Clinic Number: [id number]    Therapy Diagnosis:   Encounter Diagnoses   Name Primary?    Muscle weakness of right upper extremity Yes    Dyskinesis of right scapula     Chronic elbow pain, right      Physician: Sarmad Prince DO    Visit Date: 1/30/2025    Physician Orders: PT Eval and Treat   Medical Diagnosis from Referral: M25.521,G89.29 (ICD-10-CM) - Chronic elbow pain, right   Evaluation Date: 9/30/2024  Authorization Period Expiration: 9/23/25  Plan of Care Expiration: 12/31/24  Progress Note Due: 11/15/24  Visit # / Visits authorized: 2 / 20 (22 this episode)   FOTO: 1/N     Precautions: Standard     Time In: 0700  Time Out: 0755  Total Billable Time: 55 minutes    Subjective     Pt reports: Arm feels great. No pain. Feels good to start the season.     He was compliant with home exercise program.  Response to previous treatment: Ongoing  Functional change: Ongoing    Pain: Not verbalized /10  Location: right arms and elbow      Objective      Objective Measures updated at progress report unless specified.     1/14/25       Strength: (Lbs via HHD)    Right Left   Scaption 28.4 24.3   Shoulder ER at side 36.2 31.6   Shoulder ER at 90-90 38.3 30.8   Shoulder IR at side  34.9 37.0   Shoulder IR 90-90 29.5 28.9     R MT/LT MMT: 4/5  R SA MMT: 5/5    90-90 3-Jaw Madhu   R: 73 lbs  L: 69 lbs    Treatment     Ammon received the treatments listed below:        therapeutic exercises to develop strength, endurance, ROM, and flexibility for 13 minutes including:  Rower Lvl 7 x500m  Prisoner's warm up 3# x3 rounds       Not performed:  90 deg flex eccentric hammer curl 10# 3x12   Bat pang eccentric pronation 3x10 10#   90 deg abd biceps curl 10# 3x8   Supine skull crushers 15# 3x8  Thoracic mobility series x2 rounds   Bat pang supine biceps curls 10# 3x12  1080 ER 4 KG / 6 Kg 3x12   1080 90-90 ER 3 KG conc / 5 Kg  "ecc 3x10         manual therapy techniques: were applied for 00 minutes, including:      neuromuscular re-education activities to improve: Coordination, Kinesthetic, Sense, Proprioception, Posture, and Motor Control for 24 minutes. The following activities were included:  90-90 ER/IR BTB 3x5 ea.   Body Blade 90-90 3x to light burn   D2 BTB 3x5     Not performed:  D2 Pro/Sup Bat weight 2x8 ea.   MT Re-ed 5# 3x8 ea.   LT Re-ed 3x8 ea 5#  1080 D2 3Kg / 5Kg  3x10   1080 MT re-ed 4 Kg / 6 Kg 3x10  2Kg Weighted ball decel catch 3x10  1080 high row conc ER 2Kg / Eccentric 6 Kg 3x8   Hinge lat pull 20# ANCOR 3x12  1080 LT Tilt 2Kg 4Kg 3x8      therapeutic activities to improve functional performance for 18  minutes, including:  2x25 throwing to 60 ft with 2D video analysis.       Not performed:    20" Step up w/10# DB kaden  3x8 ea.   X2 10# plate 12" step up 3x8 ea.  Landmine Row 90# 3x8   SL RDL Landmine 70# 3x8 ea.   Split stance MB de-rotation toss 3x10 ea. 20#  Heavy 1080 Ecc Row 15 Kg 20Kg 3x10   SL RDL w/3D strap 15# 3x10 20#  15# DB Head carry kaden  LOT <-> 3x      Patient Education and Home Exercises       Education provided:   - Biomechanics and principles of proximal stability for distal control      Written Home Exercises Provided: yes. Exercises were reviewed and Ammon was able to demonstrate them prior to the end of the session.  Ammon demonstrated good  understanding of the education provided. See EMR under Patient Instructions for exercises provided during therapy sessions    Assessment     Ammon continues to demo good carry over of ROM and demo's good throwing mechanics on assessment. Will continue to progress strength to build resilience to sport and monitor in coming tx's.       Ammon Is progressing well towards his goals.   Pt prognosis is Good.     Pt will continue to benefit from skilled outpatient physical therapy to address the deficits listed in the problem list box on initial evaluation, " provide pt/family education and to maximize pt's level of independence in the home and community environment.     Pt's spiritual, cultural and educational needs considered and pt agreeable to plan of care and goals.     Anticipated barriers to physical therapy: Need for appointments around school and sport    Goals:     Short Term Goals: 2-4 weeks  1. Pt will be compliant with HEP 50% of prescribed amount. (Met)  2. The pt to demo improvement in joint mobility of the right elbow to symmetrical to uninvolved anjelica (Met)  3.  Pt will demo improvement in MT and LT strength via MMT of 1/3 grade (Met)  4. Pt will tolerate a tossing program out to 90 feet without pain or adverse response (met)     Long Term Goals: 16 weeks   Pt will be compliant with % of prescribed amount.   Pt will improve RC strength to 20% BW and 110% LSI of opposite side (Met)  Pt will improve periscapular strength by a full grade via MMT (Met)  Pt will improve 3-jaw kaden  strength to 110% LSI (Met)  Pt will progress through a return to pitching program ready for initiation of spring training (Progressing)  The pt will report full participation in ADLs and IADLs without restrictions related to R elbow/forearm.     Plan     Outpatient Physical Therapy 2 times weekly for 16 weeks to include the following interventions: Electrical Stimulation , Manual Therapy, Moist Heat/ Ice, Neuromuscular Re-ed, Patient Education, Self Care, Therapeutic Activities, Therapeutic Exercise, and Dry Needling.     Atif Nichole, PT , DPT  Board Certified in Sports Physical Therapy

## 2025-01-31 NOTE — PROGRESS NOTES
"Telemedicine/Virtual Visit Documentation:     The patient location is: home    The chief complaint leading to consultation is: see HPI    VISIT TYPE X   Virtual visit with synchronous audio and video X   Telephone E/M service      Total time spent with patient: see X papo on chart below.   More than half of the time was spent counseling or coordinating care including prognosis, differential diagnosis, risks and benefits of treatment, instructions, compliance risk reductions     EST MINUTES X   [id number] 5    [id number] 10    [id number] 15    [id number] 25 X   [id number] 40    NEW     07815 10    24143 20    [id number] 30    99666 45    [id number] 60    PHONE      5-10    10900 11-20    40816 21-30      SUBJECTIVE:     History of Present Illness:  Ammon Benjamin Robert who presents for MRI review of right knee. Concern for internal derangement. Patient does not report any new incidents or injuries since their last appointment. Pain and symptoms remain unchanged since his last appointment. Here today to discuss treatment options.  The patient denies any significant improvement in his symptoms compared to last week.    Prior Hx 1/30/2025:  19 y.o. Male who returns with a new issue of right knee pain. 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  He is accompanied by his father today.  He reports a right knee injury while standing in the shower this past Friday January 24th.  He reports that his "knee just gave out" while washing his hair.  This was acutely painful.  He denies any pre-existing issues.  No antecedent trauma.  No specific fall.  No twisting mechanism.  Since then he has fairly significant pain with clicking and popping and mechanical symptoms.  This significantly concerns him.  He has pain upwards of "12/10" in severity.  He is walking with a limp.  Again no prior problems.  Nothing unusual in practice leading up to that point.  Treatment has included rest, self-directed therapy, treatment by the , oral medication, activity modifications.  No " ipsilateral groin or hip pain.  No back or radicular symptoms.  No numbness or tingling.    Review of patient's allergies indicates:  No Known Allergies    No past medical history on file.  Past Surgical History:   Procedure Laterality Date    ADENOIDECTOMY Bilateral     TYMPANOSTOMY TUBE PLACEMENT Bilateral      Family History   Problem Relation Name Age of Onset    No Known Problems Mother      No Known Problems Father       Social History     Tobacco Use    Smoking status: Never    Smokeless tobacco: Never   Substance Use Topics    Alcohol use: No     Alcohol/week: 0.0 standard drinks of alcohol    Drug use: No      Review of Systems:  Patient denies constitutional symptoms, cardiac symptoms, respiratory symptoms, GI symptoms.  The remainder of the musculoskeletal ROS is included in the HPI.    OBJECTIVE:     Physical Exam:  Gen:  No acute distress    MSK:  MRI 02/02/25 of right knee reviewed by me and discussed with patient. Study shows:   No evidence for lateral meniscal tear or fibular collateral ligament injury.     ASSESSMENT/PLAN:     A/P: Ammon Jones is a 19 y.o. with the acute atraumatic onset right knee pain starting last week; negative MRI    Plan:  Imaging reviewed with the patient.  No discrete internal derangement or abnormality seen on MRI.  No history of known specific trauma.  A bit unusual presentation.  I have recommended initial conservative treatment.  Prescription given for a Medrol Dosepak.  May take Celebrex thereafter.  PT referral placed to [facility].  This would be a day-to-day, week 2 week type of injury to get back to sport.  Symptom based.  I have discussed this with the school .  I will see the patient back in 2 weeks to make sure he is making appropriate progress.

## 2025-02-02 ENCOUNTER — HOSPITAL ENCOUNTER (OUTPATIENT)
Dept: RADIOLOGY | Facility: HOSPITAL | Age: 20
Discharge: HOME | End: 2025-02-02
Attending: ORTHOPAEDIC SURGERY
Payer: COMMERCIAL

## 2025-02-02 DIAGNOSIS — M25.561 RIGHT KNEE PAIN: ICD-10-CM

## 2025-02-02 PROCEDURE — 73721 MRI JNT OF LWR EXTRE W/O DYE: CPT | Mod: TC,RT

## 2025-02-02 PROCEDURE — 73721 MRI JNT OF LWR EXTRE W/O DYE: CPT | Mod: 26,RT,, | Performed by: RADIOLOGY

## 2025-02-04 ENCOUNTER — ATHLETIC TRAINING SESSION (OUTPATIENT)
Dept: SPORTS MEDICINE | Facility: CLINIC | Age: 20
End: 2025-02-04
Payer: COMMERCIAL

## 2025-02-04 ENCOUNTER — OFFICE VISIT (OUTPATIENT)
Dept: SPORTS MEDICINE | Facility: CLINIC | Age: 20
End: 2025-02-04
Payer: COMMERCIAL

## 2025-02-04 DIAGNOSIS — Z00.00 HEALTHCARE MAINTENANCE: Primary | ICD-10-CM

## 2025-02-04 DIAGNOSIS — M25.561 ACUTE PAIN OF RIGHT KNEE: Primary | ICD-10-CM

## 2025-02-04 PROCEDURE — 98006 SYNCH AUDIO-VIDEO EST MOD 30: CPT | Mod: 95,,, | Performed by: ORTHOPAEDIC SURGERY

## 2025-02-04 RX ORDER — METHYLPREDNISOLONE 4 MG/1
TABLET ORAL
Qty: 21 EACH | Refills: 0 | Status: SHIPPED | OUTPATIENT
Start: 2025-02-04

## 2025-02-04 RX ORDER — CELECOXIB 200 MG/1
200 CAPSULE ORAL DAILY
Qty: 30 CAPSULE | Refills: 1 | Status: SHIPPED | OUTPATIENT
Start: 2025-02-04

## 2025-02-04 NOTE — PROGRESS NOTES
Reason for Encounter Follow-Up    Subjective:       Chief Complaint: Ammon Jones is a 19 y.o. male student at [employer]) who had concerns including Health Maintenance of the Right Upper Arm.    Ammon is a  at [employer]. He started having elbow pain towards the end of the summer and it worsened in the fall season. He has been going to formal PT and doing rehab in the [facility].     Handedness: right-handed  Sport played: baseball      Level: college      Position:pitcher      Pain    Injury        ROS              Objective:       General: Ammon is well-developed, well-nourished, appears stated age, in no acute distress, alert and oriented to time, place and person.     AT Session          Assessment:     Status: F - Full Participation    Date Seen:  01/26/2025-02/01/2025    Date of Injury:  NA    Date Out:  NA    Date Cleared:  NA        Treatment/Rehab/Maintenance:   Ammon completed therapeutic exercises to develop strength:    Date: 01/29/2025    Exercises  Sets x Reps  Weight   UE nerve flosses  2 x 12    Wrist stretches (flexion and extension)  3 x 30s     Pronation/supination  4 x 12  8 lbs    Radial/ulnar deviation  4 x 12  8 lbs    D1 flexion  4 x 12  Blue theraband    D2 extension  4 x 12  Blue theraband    D1 extension  4 x 12  Blue theraband    D2 flexion  4 x 12  Blue theraband    Chest supported I, Y, T  4 x 8  3 lbs                            Plan:       1. Daily arm care work to keep arm healthy during season.   2. Physician Referral: no  3. ED Referral:no  4. Parent/Guardian Notified: No  5. All questions were answered, ath. will contact me for questions or concerns in  the interim.  6.         Eligible to use School Insurance: Yes

## 2025-02-04 NOTE — PROGRESS NOTES
Reason for Encounter Follow-Up    Subjective:       Chief Complaint: Ammon Jones is a 19 y.o. male student at [employer]) who had concerns including Injury of the Right Knee.    Ammon reported R knee pain a few weeks ago. He presented similar to a lateral meniscus tear, but had no mechanism of injury. I sent him to see Dr. Waldron to make sure there is nothing torn and to find out if there were any other underlying issues. Until his follow up we are doing light rehab, mainly focusing on mobility and keeping his knee from getting stiff.     Handedness: right-handed  Sport played: baseball      Level: college      Position:pitcher      Injury        ROS              Objective:       General: Ammon is well-developed, well-nourished, appears stated age, in no acute distress, alert and oriented to time, place and person.     AT Session          Assessment:     Status: O - Out    Date Seen:  01/26/2025-02/01/2025    Date of Injury:  01/18/2025    Date Out:  01/23/2025    Date Cleared:  NA        Treatment/Rehab/Maintenance:   Ammon completed therapeutic exercises to develop ROM:    Date: 02/01/2025    Exercises  Sets x Reps  Weight   Towel slides 3 x 6    Quad squeezes  3 x 10     Short arc quad  3 x 10               Modality: Combo  Date 02/01/2025, Ultrasound Hertz 1.8, E-Stem Frequency 17.5, and Duration 7 min IF waveform    Ammon completed therapeutic exercises to develop ROM:    Date: 01/31/2025    Exercises  Sets x Reps  Weight   Towel slides 3 x 6    Quad squeezes  3 x 10     Short arc quad  3 x 10                   Ammon completed therapeutic exercises to develop ROM:    Date: 01/30/2025    Exercises  Sets x Reps  Weight   Towel slides 3 x 6    Quad squeezes  3 x 10     Short arc quad  3 x 10               Modality: Combo  Date 01/30/2025, Ultrasound Hertz 1.8, E-Stem Frequency 17.5, and Duration 7 min IF waveform    Ammon completed therapeutic exercises to develop ROM:    Date:  01/29/2025    Exercises  Sets x Reps  Weight   Towel slides 3 x 6    Quad squeezes  3 x 10     Short arc quad  3 x 10               Modality: Combo  Date 01/29/2025, Ultrasound Hertz 1.8, E-Stem Frequency 17.5, and Duration 7 min  IF waveform  Modality: Normatec  Date 01/29/2025, Legs, and Duration 7 minutes    Hot bath with epsom salt: 15 min         Ammon completed therapeutic exercises to develop ROM:    Date: 01/28/2025    Exercises  Sets x Reps  Weight   Towel slides 3 x 6    Quad squeezes  3 x 10     Short arc quad  3 x 10     Long arc qaud  3 x 8          Modality: Combo  Date 01/28/2025, Ultrasound Hertz 1.8, E-Stem Frequency 12, and Duration 7 min  Pre-mod wave form         Plan:       1. Daily rehab and treatment to keep knee moving and reduce pain. Will create a new plan after follow up with Dr. Waldron.  2. Physician Referral: yes  3. ED Referral:no  4. Parent/Guardian Notified: Yes Parent Name: Aimee Jones  Date 01/26/2025  Time: 11:00 am  Method of Communication: Phone Call  5. All questions were answered, ath. will contact me for questions or concerns in  the interim.  6.         Eligible to use School Insurance: No, not a school related injury

## 2025-02-11 ENCOUNTER — ATHLETIC TRAINING SESSION (OUTPATIENT)
Dept: SPORTS MEDICINE | Facility: CLINIC | Age: 20
End: 2025-02-11
Payer: COMMERCIAL

## 2025-02-11 ENCOUNTER — CLINICAL SUPPORT (OUTPATIENT)
Dept: REHABILITATION | Facility: HOSPITAL | Age: 20
End: 2025-02-11
Attending: ORTHOPAEDIC SURGERY
Payer: COMMERCIAL

## 2025-02-11 DIAGNOSIS — R26.89 IMPAIRMENT OF BALANCE: ICD-10-CM

## 2025-02-11 DIAGNOSIS — M25.561 RIGHT KNEE PAIN, UNSPECIFIED CHRONICITY: Primary | ICD-10-CM

## 2025-02-11 DIAGNOSIS — M25.671 DECREASED RANGE OF MOTION OF RIGHT ANKLE: ICD-10-CM

## 2025-02-11 DIAGNOSIS — M62.81 WEAKNESS OF TRUNK MUSCULATURE: ICD-10-CM

## 2025-02-11 DIAGNOSIS — M25.561 ACUTE PAIN OF RIGHT KNEE: Primary | ICD-10-CM

## 2025-02-11 DIAGNOSIS — Z78.9 IMPAIRED MOTOR CONTROL: ICD-10-CM

## 2025-02-11 DIAGNOSIS — Z00.00 HEALTHCARE MAINTENANCE: Primary | ICD-10-CM

## 2025-02-11 PROCEDURE — 97140 MANUAL THERAPY 1/> REGIONS: CPT

## 2025-02-11 PROCEDURE — 97161 PT EVAL LOW COMPLEX 20 MIN: CPT

## 2025-02-11 PROCEDURE — 97110 THERAPEUTIC EXERCISES: CPT

## 2025-02-11 NOTE — PROGRESS NOTES
Reason for Encounter Follow-Up    Subjective:       Chief Complaint: Ammon Jones is a 19 y.o. male student at [employer]) who had concerns including Pain of the Right Knee.    Ammon reported R knee pain a few weeks ago. He presented similar to a lateral meniscus tear, but had no mechanism of injury. I sent him to see Dr. Waldron to make sure there is nothing torn and to find out if there were any other underlying issues. Until his follow up we are doing light rehab, mainly focusing on mobility and keeping his knee from getting stiff.     Handedness: right-handed  Sport played: baseball      Level: college      Position:pitcher      Pain    Injury        ROS              Objective:       General: Ammon is well-developed, well-nourished, appears stated age, in no acute distress, alert and oriented to time, place and person.     AT Session          Assessment:     Status: O - Out    Date Seen:  02/02/2025-02/08/2025    Date of Injury:  01/18/2025    Date Out:  01/23/2025    Date Cleared:  NA        Treatment/Rehab/Maintenance:   Ammon completed therapeutic exercises to develop ROM:    Date: 02/06/2025    Exercises  Sets x Reps  Weight   Towel slides 3 x 8    Quad squeezes  3 x 10     Short arc quad  3 x 10  2 lbs   Long arc quad  3 x 10  2 lbs    Hamstring floss  3 x 10     Manual ankle, knee, and hip mobility     Ammon completed therapeutic exercises to develop ROM:    Date: 02/05/2025    Exercises  Sets x Reps  Weight   Towel slides 3 x 8    Quad squeezes  3 x 10     Short arc quad  3 x 10  2 lbs   Long arc quads  3 x 10  2 lbs    Hamstring floss  3 x 10     Manual ankle, knee, and hip movement     Ammon completed therapeutic exercises to develop ROM:    Date: 02/04/2025    Exercises  Sets x Reps  Weight   Towel slides 3 x 8    Quad squeezes  3 x 10     Short arc quad  3 x 10  2 lbs    Long arc quads 3 x 10  2 lbs    Hamstring flosses  3 x 10     Manual ankle, knee, and hip movement  Modality:  Combo  Date 02/04/2025, Ultrasound Hertz 1.8, E-Stem Frequency 17.5, and Duration 7 min IF waveform    Ammon completed therapeutic exercises to develop ROM:    Date: 02/3/2025    Exercises  Sets x Reps  Weight   Towel slides 3 x 6    Quad squeezes  3 x 10     Short arc quad  3 x 10          Manual ankle, knee, and hip movement      Ammon completed therapeutic exercises to develop ROM:    Date: 02/02/2025    Exercises  Sets x Reps  Weight   Towel slides 3 x 6    Quad squeezes  3 x 10     Short arc quad  3 x 10               Manual ankle, knee, and hip movement         Plan:       1. Daily rehab and treatment to keep knee moving and reduce pain. Will create a new plan after follow up with Dr. Waldron.  2. Physician Referral: yes  3. ED Referral:no  4. Parent/Guardian Notified: Yes Parent Name: Aimee Jones  Date 01/26/2025  Time: 11:00 am  Method of Communication: Phone Call  5. All questions were answered, ath. will contact me for questions or concerns in  the interim.  6.         Eligible to use School Insurance: No, not a school related injury

## 2025-02-11 NOTE — PROGRESS NOTES
[facility]   Physical Therapy Initial Evaluation      Name: Ammon Jones  Clinic Number: [id number]    Therapy Diagnosis:   Encounter Diagnoses   Name Primary?    Right knee pain, unspecified chronicity Yes    Decreased range of motion of right ankle     Impairment of balance     Impaired motor control     Weakness of trunk musculature         Physician: STEFANI Waldron MD    Physician Orders: PT Eval and Treat   Medical Diagnosis from Referral: M25.561 (ICD-10-CM) - Right knee pain   Evaluation Date: 2/11/2025  Authorization Period Expiration: 12/31/2025  Plan of Care Expiration: 07/01/25  Progress Note Due: 4/15/25  Visit # / Visits authorized: 1/ 1   FOTO: 1/N    Precautions: Standard     Time In: 0803  Time Out: 0900  Total Appointment Time (timed & untimed codes): 57 minutes    Subjective     Date of onset: 2 weeks prior    History of current condition - : 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 He reports he was in the shower when he felt a sharp stabbing pain in the lateral region of his right knee and felt it give way briefly. The pain persisted since making it difficult to walk and unable to run or pitch. He was seen by Dr. Waldron at [facility]. MRI imaging showed no significant findings. He was prescribed Celebrex and reports significant relief within 2 days following this. He has not had any pain in his knee in the last week. Pain is localized along the lateral TF joint line at the site of the proximal fibular head. Pain has not referred proximal or distal and he denies any N&T. Also denies hip or low back pain. He does report a feeling like his knee was stiff and would not move. Denies locking/catching. Pain was also aggravated with end range flexion and extension. No past hx of knee issues. PMHx is significant for multiple R ankle sprains. Plans to play as tolerated per his team physician.     Imaging: MRI  studies:     MRI KNEE WITHOUT CONTRAST RIGHT     CLINICAL HISTORY:  Knee pain, chronic, negative xray (Age >= 5y);Pain in right knee     TECHNIQUE:  Multiplanar, multisequence images were performed about the RIGHT knee.     COMPARISON:  01/30/2025     FINDINGS:  **MENISCI:     Medial meniscus: Intact anterior horn, body, and posterior horn.     Lateral meniscus: Intact anterior horn, body, and posterior horn.     Meniscal root attachment: Intact     Popliteal hiatus, superior and inferior meniscal fascicles:Intact and visualized.     **LIGAMENTS:     Anterior cruciate ligament: Continuous, with normal signal.     Posterior cruciate ligament: Continuous, with normal signal.     Medial collateral ligament: Intact.     Lateral collateral ligament and  biceps femoris: Intact.     Iliotibial band (ITB): No evidence for ITB syndrome.  Mild thickening of the IT band, nonspecific.     Popliteus tendon and popliteofibular ligament: Intact.     Posterior medial and posterior lateral corners: Intact.     **TENDONS:     Quadriceps tendon: Intact.     Patella tendon: Intact.     Joint fluid: Physiologic.     Hoffa's fat pad: Normal.     Intact medial retinaculum/ MPFL.     Intact lateral retinaculum.     **CARTILAGE:     Patellofemoral:Preserved medial and  lateral patellar facet cartilage.Median ridge demonstrates no focal abnormality.  Preserved trochlear groove cartilage.  Preservedanterior medial and anterior lateral femoral trochlea facet cartilage.     Medial tibiofemoral: Articular cartilage is preserved without focal defects or subchondral marrow edema.Internal aspect of medial femoral condyle cartilage is intact.     Lateral tibiofemoral: Articular cartilage is preserved without focal defects or subchondral marrow edema.Cartilage at posterior medial aspect of lateral tibial plateau is intact.     **OTHER:     Bone marrow: No fracture or marrow replacing process.     Miscellaneous: The gastrocnemius muscles are  "normal.Proximal tibia-fibula joint relationships preserved. No evident plica thickening.     Impression:     No evidence for lateral meniscal tear or fibular collateral ligament injury.    Prior Therapy: None for present problem. 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   Social History: lives with their family  Occupation: RHP at [employer]  Prior Level of Function: Independent  Current Level of Function: Independent. Some restriction with participation in sport     Pain:  Current 0/10, worst 9/10, best 0/10   Location: right knee   Description: Aching, Dull, Tight, Deep, and Sharp  Aggravating Factors: Bending, Walking, Extension, Flexing, and running  Easing Factors: pain medication    Patients goals: Return to play with his team as quickly and safely as possible.      Medical History:   No past medical history on file.    Surgical History:   Ammon Jones  has a past surgical history that includes Tympanostomy tube placement (Bilateral) and Adenoidectomy (Bilateral).    Medications:   Ammon has a current medication list which includes the following prescription(s): celecoxib, dextromethorphan-guaifenesin  mg, ibuprofen, and methylprednisolone.    Allergies:   Review of patient's allergies indicates:  No Known Allergies     Objective      Posture: Mildly increased forefoot pronation on R     Functional Tests:  Gait: Unremarkable   OH Squat: Slight lateral shift to L  SL Squat Test: R: To 60 deg knee flexion, Decreased ant tibial translation ; L Mild dynamic valgus to 70 deg knee flexion  SLS EO: R >20"  , L >20"    Knee Passive Range of Motion:   Right  Left    Flexion 140 140   Extension +5 HE +5 HE       Hip Passive Range of Motion:   Right  Left    Flexion 110 110   Abduction 40 40   Extension 10 15   Ext. Rotation 45 45   Int. Rotation 25 25       Ankle Passive Range of Motion:   Right  Left    Dorsiflexion KE 10 15   1st MTP Extension 60 60       Lower Extremity Strength:   Right  Left    Quadriceps: " "5 5/5   Hamstring at 90 de+/5 4+/5   Hamstring at 15 de/ 5/5   Iliopsoas (sitting): 5/ 5/5   Hip extension:  4/5 4+/5   PGM: 4+/5 4+/5       Special Tests:   Right Left   Varus Stress  - -      Right Left   Thessaly's Test - -   McMurrays  - -      Right Left   Patella Grind - -   Plica Stutter Test - -     MSI Right Left   Bridge + -     Joint Mobility: Hypomobility of the R Lat STJ glide, Post TCJ glide, Ant/post proximal fibular head glide, Accessory med/lat/ER/IR of TF joint      Palpation: Unremarkable     Flexibility:    Ely's test: R - ; L -    Hamstrings: R + ; L  +   Paul Test Right  Left    Iliopsoas + +   Rectus Femoris  - -     Fibular N. Biased SLR unremarkable bilaterally: 50 deg to post thigh stretch (B)    Y-Balance Test: (cm)   Anterior Posterior Medial Posterior Lateral   Left Average 61 106 100   Right Average 54 97 92   Deficit 7 9 8         Limitation/Restriction for FOTO Knee Survey    Therapist reviewed FOTO scores for Ammon Jones on 2025.   FOTO documents entered into [employer] - see Media section.    Limitation Score: See media section%         Treatment     Total Treatment time (time-based codes) separate from Evaluation: 31 minutes     Ammon received the treatments listed below:      therapeutic exercises to develop strength, endurance, ROM, and flexibility for 17 minutes includin/2 kneeling CKC DF MWM 2x10   Self STJ lat glide 2x10  Tripod hip ext 2x10x5"     manual therapy techniques:  were applied for 14 minutes, including:  Supine thoracic HVLA  Lat STJ HVLA/Whip  TCJ distraction HVLA  Ant Fib head HVLA       Patient Education and Home Exercises     Education provided:   - Prognosis, Tissue Healing Timelines, Activity Modification  - Biomechanics and principles of proximal stability for distal control     Written Home Exercises Provided: yes. Exercises were reviewed and Ammon was able to demonstrate them prior to the end of the session.  Ammon demonstrated " good  understanding of the education provided. See EMR under Patient Instructions for exercises provided during therapy sessions.    Assessment     Ammon is a 19 y.o. male referred to outpatient Physical Therapy with a medical diagnosis of M25.561 (ICD-10-CM) - Right knee pain. Patient presents with signs and sxs consistent with an acute joint dysfunction of the proximal tib/fib joint and lateral TF joint with associated dysfunctions of the foot/ankle. Relevant deficits include decreased mobility of the foot/ankle and knee, decreased dynamic postural control, decreased R hip extension mobility, and motor control impairments.     Patient prognosis is Good.   Patient will benefit from skilled outpatient Physical Therapy to address the deficits stated above and in the chart below, provide patient /family education, and to maximize patientt's level of independence.     Plan of care discussed with patient: Yes  Patient's spiritual, cultural and educational needs considered and patient is agreeable to the plan of care and goals as stated below:     Anticipated Barriers for therapy: None    Medical Necessity is demonstrated by the following  History  Co-morbidities and personal factors that may impact the plan of care Co-morbidities:   None    Personal Factors:   no deficits     low   Examination  Body Structures and Functions, activity limitations and participation restrictions that may impact the plan of care Body Regions:   lower extremities  trunk    Body Systems:    ROM  strength  gross coordinated movement  balance  transitions  motor control  motor learning    Participation Restrictions:   Baseball as tolerated.     Activity limitations:   Learning and applying knowledge  No deficit    General Tasks and Commands  No deficit    Communication  No deficit    Mobility  running    Self care  No deficit    Domestic Life  No deficit    Interactions/Relationships  No deficit    Life Areas  Recreational Activities to A with  health and wellness     Community and Social Life  No deficit          moderate   Clinical Presentation stable and uncomplicated low   Decision Making/ Complexity Score: low       Short Term Goals: 2-4 weeks  1. Pt will be compliant with HEP 50% of prescribed amount.   2. The pt to demo improvement in FOTO score to meet or exceed MCID  3.  Pt will improve LQ YBT to within 6 cm LSI  4. Pt will improve passive hip extension to 15 deg     Long Term Goals: 8 weeks   Pt will be compliant with % of prescribed amount.   Pt will demo symmetrical ankle mobility with good joint accessory mobility  Pt will improve FOTO score to meet or exceed predicted outcome   Pt will improve LQYBT to within 4cm LSI   Pt will demo a symmetrical SL squat to within 70 deg knee flexion with good biomechanics   Pt will report ability to fully participate in baseball without pain or limitations  The pt will report full participation in ADLs and IADLs without restrictions related to R Knee.     Plan     Plan of care Certification: 2/11/2025 to 04/15/25.    Outpatient Physical Therapy 2 times weekly for 8 weeks to include the following interventions: Electrical Stimulation  , Gait Training, Manual Therapy, Moist Heat/ Ice, Neuromuscular Re-ed, Patient Education, Self Care, Therapeutic Activities, Therapeutic Exercise, and Dry Needling .     Atif Nichole, PT, DPT  Board Certified in Sports Physical Therapy

## 2025-02-11 NOTE — PROGRESS NOTES
Reason for Encounter Follow-Up    Subjective:       Chief Complaint: Ammon Jones is a 19 y.o. male student at [employer]) who had concerns including Pain of the Right Knee.    Ammon reported R knee pain a few weeks ago. He presented similar to a lateral meniscus tear, but had no mechanism of injury. I sent him to see Dr. Waldron to make sure there is nothing torn and to find out if there were any other underlying issues. Until his follow up we are doing light rehab, mainly focusing on mobility and keeping his knee from getting stiff.     Handedness: right-handed  Sport played: baseball      Level: college      Position:pitcher      Pain    Injury        ROS              Objective:       General: Ammon is well-developed, well-nourished, appears stated age, in no acute distress, alert and oriented to time, place and person.     AT Session          Assessment:     Status: AT - Cleared to Exert    Date Seen:  02/09/2025-02/15/2025    Date of Injury:  01/18/2025    Date Out:  01/23/2025    Date Cleared:  NA        Treatment/Rehab/Maintenance:   Ammon completed therapeutic exercises to develop ROM:    Date: 02/15/2025    Exercises  Sets x Reps  Weight   Long arc quads  3 x 10  2 lbs    Short arc quads  3 x 10  2 lbs    Hamstring floss  3 x 10     Split squat pulses  3 x 10     Single leg RDL  3 x 10     Calf raises double leg  3 x 12    Manual ankle, knee, and hip mobility       Ammon completed therapeutic exercises to develop ROM:    Date: 02/14/2025    Exercises  Sets x Reps  Weight   Long arc quads  3 x 10  2 lbs    Short arc quads  3 x 10  2 lbs    Hamstring floss  3 x 10     Split squat pulses  3 x 10     Single leg RDL  3 x 10     Calf raises double leg  3 x 12    Manual ankle, knee, and hip mobility     Ammon completed therapeutic exercises to develop ROM:    Date: 02/10/2025    Exercises  Sets x Reps  Weight   Long arc quads  3 x 10  2 lbs    Short arc quads  3 x 10  2 lbs    Hamstring floss  3  x 10     Split squat pulses  3 x 10     Single leg RDL  3 x 10     Calf raises double leg  3 x 12    Manual ankle, knee, and hip mobility       Plan:       1. Daily rehab and treatment to keep knee moving and reduce pain. Will create a new plan after follow up with Dr. Waldron.  2. Physician Referral: yes  3. ED Referral:no  4. Parent/Guardian Notified: Yes Parent Name: Aimee Jones  Date 01/26/2025  Time: 11:00 am  Method of Communication: Phone Call  5. All questions were answered, ath. will contact me for questions or concerns in  the interim.  6.         Eligible to use School Insurance: No, not a school related injury

## 2025-02-11 NOTE — PROGRESS NOTES
Reason for Encounter Follow-Up    Subjective:       Chief Complaint: Ammon Jones is a 19 y.o. male student at [employer]) who had concerns including Health Maintenance of the Right Upper Arm.    Ammon is a  at [employer]. He started having elbow pain towards the end of the summer and it worsened in the fall season. He has been going to formal PT and doing rehab in the [facility].     Handedness: right-handed  Sport played: baseball      Level: college      Position:pitcher      Pain    Injury        ROS              Objective:       General: Ammon is well-developed, well-nourished, appears stated age, in no acute distress, alert and oriented to time, place and person.     AT Session          Assessment:     Status: F - Full Participation    Date Seen:  02/02/2025-02/08/2025    Date of Injury:  NA    Date Out:  NA    Date Cleared:  NA        Treatment/Rehab/Maintenance:   Ammon completed therapeutic exercises to develop strength and ROM:    Date: 02/06/2025    Exercises  Sets x Reps  Weight   Lateral band presses 3 x 12  Blue heavy resistance band    Band snow angles  4 x 12 Red J bands    Band serratus scoops  4 x 12  Blue heavy resistance bands   Scap ups  4 x 12    Plank saws  4 x 12                                         Ammon completed therapeutic exercises to develop strength:    Date: 02/04/2025    Exercises  Sets x Reps  Weight   UE nerve flosses  2 x 12    Wrist stretches (flexion and extension)  3 x 30s     Pronation/supination  4 x 12  8 lbs    Radial/ulnar deviation  4 x 12  8 lbs    D1 flexion  4 x 12  Blue theraband    D2 extension  4 x 12  Blue theraband    D1 extension  4 x 12  Blue theraband    D2 flexion  4 x 12  Blue theraband    Chest supported I, Y, T  4 x 8  3 lbs    Scap ups  4 x 12     Plank saws 4 x 12      Ammon completed therapeutic exercises to develop strength and ROM:    Date: 02/03/2025    Exercises  Sets x Reps  Weight   Lateral band presses 3 x 12  Blue  heavy resistance band    Band snow angles  4 x 12 Red J bands    Band serratus scoops  4 x 12  Blue heavy resistance bands                                                         Plan:       1. Daily arm care work to keep arm healthy during season.   2. Physician Referral: no  3. ED Referral:no  4. Parent/Guardian Notified: No  5. All questions were answered, ath. will contact me for questions or concerns in  the interim.  6.         Eligible to use School Insurance: Yes

## 2025-02-17 ENCOUNTER — CLINICAL SUPPORT (OUTPATIENT)
Dept: REHABILITATION | Facility: HOSPITAL | Age: 20
End: 2025-02-17
Payer: COMMERCIAL

## 2025-02-17 DIAGNOSIS — M62.81 WEAKNESS OF TRUNK MUSCULATURE: ICD-10-CM

## 2025-02-17 DIAGNOSIS — M25.671 DECREASED RANGE OF MOTION OF RIGHT ANKLE: ICD-10-CM

## 2025-02-17 DIAGNOSIS — M25.561 RIGHT KNEE PAIN, UNSPECIFIED CHRONICITY: Primary | ICD-10-CM

## 2025-02-17 DIAGNOSIS — Z78.9 IMPAIRED MOTOR CONTROL: ICD-10-CM

## 2025-02-17 DIAGNOSIS — R26.89 IMPAIRMENT OF BALANCE: ICD-10-CM

## 2025-02-17 PROCEDURE — 97110 THERAPEUTIC EXERCISES: CPT

## 2025-02-17 PROCEDURE — 97112 NEUROMUSCULAR REEDUCATION: CPT

## 2025-02-17 PROCEDURE — 97140 MANUAL THERAPY 1/> REGIONS: CPT

## 2025-02-17 NOTE — PROGRESS NOTES
[facility]   Physical Therapy Treatment Note      Name: Ammon Benjamin [facility] Number: [id number]    Therapy Diagnosis:   Encounter Diagnoses   Name Primary?    Right knee pain, unspecified chronicity Yes    Decreased range of motion of right ankle     Impairment of balance     Impaired motor control     Weakness of trunk musculature      Physician: Sarmad Prince DO    Visit Date: 2025    Physician Orders: PT Eval and Treat   Medical Diagnosis from Referral: M25.561 (ICD-10-CM) - Right knee pain   Evaluation Date: 2025  Authorization Period Expiration: 2025  Plan of Care Expiration: 25  Progress Note Due: 4/15/25  Visit # / Visits authorized:    FOTO:      Precautions: Standard     Time In: 0800  Time Out: 0858  Total Billable Time: 54 minutes    Subjective     Pt reports: His knee has been good overall but he had a return of the lateral knee pain after sitting after he pitched his first inning on Friday. The next day the pain was gone and he was able to run without pain.     He was compliant with home exercise program.  Response to previous treatment: Ongoing  Functional change: Ongoing    Pain: 0/10  Location: right knee      Objective      Objective Measures updated at progress report unless specified.     Treatment     Ammon received the treatments listed below:      therapeutic exercises to develop strength, endurance, ROM, and flexibility for 15 minutes includin/2 Kneeling CKC DF MWM 35# 2x15   1/2 kneeling hip flexor dynamic mobility w/anti-extension 2x15 ea.     manual therapy techniques:  were applied for 15 minutes, including:  Proximal fib head HVLA   Tibial ER/IR accessory glide  Post tibial glide Grd III-IV  Lat STJ HVLA  TCJ distraction HVLA    neuromuscular re-education activities to improve: Balance, Coordination, Kinesthetic, Sense, Proprioception, and Motor Control for 24 minutes. The following activities were included:  Runner's  clamshell BTB 3x12   SL squat foam roll target for knee 3x12   BTB LBW <-> 25 ft 3x     therapeutic activities to improve functional performance for 00  minutes, including:        Patient Education and Home Exercises       Education provided:   - Updated/reviewed HEP     Written Home Exercises Provided: yes. Exercises were reviewed and Ammon was able to demonstrate them prior to the end of the session.  Ammon demonstrated good  understanding of the education provided. See EMR under Patient Instructions for exercises provided during therapy sessions    Assessment     Ammon presents with similar deficits to time of eval. Added interventions to improve proximal hip/glute recruitment to decrease loading to knee as well as squat training to improve biomechanics. All progressions well tolerated. Sxs irritability decreased from initial onset based on pt reports of sxs lasting 1/2 day.     Ammon Is progressing well towards his goals.   Pt prognosis is Excellent.     Pt will continue to benefit from skilled outpatient physical therapy to address the deficits listed in the problem list box on initial evaluation, provide pt/family education and to maximize pt's level of independence in the home and community environment.     Pt's spiritual, cultural and educational needs considered and pt agreeable to plan of care and goals.     Anticipated barriers to physical therapy: None    Goals:     Short Term Goals: 2-4 weeks  1. Pt will be compliant with HEP 50% of prescribed amount.   2. The pt to demo improvement in FOTO score to meet or exceed MCID  3.  Pt will improve LQ YBT to within 6 cm LSI  4. Pt will improve passive hip extension to 15 deg      Long Term Goals: 8 weeks   Pt will be compliant with % of prescribed amount.   Pt will demo symmetrical ankle mobility with good joint accessory mobility  Pt will improve FOTO score to meet or exceed predicted outcome   Pt will improve LQYBT to within 4cm LSI   Pt will demo a  symmetrical SL squat to within 70 deg knee flexion with good biomechanics   Pt will report ability to fully participate in baseball without pain or limitations  The pt will report full participation in ADLs and IADLs without restrictions related to R Knee.     Plan     Outpatient Physical Therapy 2 times weekly for 8 weeks to include the following interventions: Electrical Stimulation  , Gait Training, Manual Therapy, Moist Heat/ Ice, Neuromuscular Re-ed, Patient Education, Self Care, Therapeutic Activities, Therapeutic Exercise, and Dry Needling .     Atif Nichole, PT , DPT  Board Certified in Sports Physical Therapy     Spontaneous

## 2025-02-18 NOTE — PROGRESS NOTES
"CC: Right knee follow up     Ammon Jones, presents today for follow up appointment of his right knee. Patient does not report any new incidents or injuries since their last appointment. He has attended 2 PT sessions at [facility] with Atif.  Overall much better compared to before.  Accompanied by his mother.  Was able to pitch at the end of last week.  Next pitching outing is scheduled for this coming Monday.  Medrol Dosepak was helpful.  Taking over-the-counter ibuprofen for now.    Prior Hx 2/4/2025:   Ammon Jones who presents for MRI review of right knee. Concern for internal derangement. Patient does not report any new incidents or injuries since their last appointment. Pain and symptoms remain unchanged since his last appointment. Here today to discuss treatment options.  The patient denies any significant improvement in his symptoms compared to last week.    Prior Hx 1/30/2025:  19 y.o. Male who returns with a new issue of right knee pain. 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  He is accompanied by his father today.  He reports a right knee injury while standing in the shower this past Friday January 24th.  He reports that his "knee just gave out" while washing his hair.  This was acutely painful.  He denies any pre-existing issues.  No antecedent trauma.  No specific fall.  No twisting mechanism.  Since then he has fairly significant pain with clicking and popping and mechanical symptoms.  This significantly concerns him.  He has pain upwards of "12/10" in severity.  He is walking with a limp.  Again no prior problems.  Nothing unusual in practice leading up to that point.  Treatment has included rest, self-directed therapy, treatment by the , oral medication, activity modifications.  No ipsilateral groin or hip pain.  No back or radicular symptoms.  No numbness or tingling.    REVIEW OF SYSTEMS:   Constitution: Negative. Negative for chills, fever and night sweats.    Hematologic/Lymphatic: " Negative for bleeding problem. Does not bruise/bleed easily.   Skin: Negative for dry skin, itching and rash.   Musculoskeletal: Negative for falls. Positive for right knee pain and muscle weakness.     All other review of symptoms were reviewed and found to be noncontributory.     PAST MEDICAL HISTORY:   No past medical history on file.  PAST SURGICAL HISTORY:   Past Surgical History:   Procedure Laterality Date    ADENOIDECTOMY Bilateral     TYMPANOSTOMY TUBE PLACEMENT Bilateral      FAMILY HISTORY:   Family History   Problem Relation Name Age of Onset    No Known Problems Mother      No Known Problems Father       SOCIAL HISTORY:   Social History     Socioeconomic History    Marital status: Single   Tobacco Use    Smoking status: Never    Smokeless tobacco: Never   Substance and Sexual Activity    Alcohol use: No     Alcohol/week: 0.0 standard drinks of alcohol    Drug use: No     Social Drivers of Health     Financial Resource Strain: Patient Declined (9/22/2024)    Overall Financial Resource Strain (CARDIA)     Difficulty of Paying Living Expenses: Patient declined   Food Insecurity: Patient Declined (9/22/2024)    Hunger Vital Sign     Worried About Running Out of Food in the Last Year: Patient declined     Ran Out of Food in the Last Year: Patient declined   Transportation Needs: No Transportation Needs (7/29/2021)    Received from [facility]     Lack of Transportation (Medical): No     Lack of Transportation (Non-Medical): No   Physical Activity: Sufficiently Active (9/22/2024)    Exercise Vital Sign     Days of Exercise per Week: 5 days     Minutes of Exercise per Session: 120 min   Stress: No Stress Concern Present (9/22/2024)    [employer] [place] [employer] - Occupational Stress Questionnaire     Feeling of Stress : Not at all   Housing Stability: Unknown (9/22/2024)    Housing Stability Vital Sign     Unable to Pay for Housing in the Last Year: Patient declined  "    MEDICATIONS:     Current Outpatient Medications:     celecoxib (CELEBREX) 200 MG capsule, Take 1 capsule (200 mg total) by mouth once daily., Disp: 30 capsule, Rfl: 1    dextromethorphan-guaiFENesin  mg (MUCINEX DM)  mg per 12 hr tablet, Take 1 tablet by mouth every 12 (twelve) hours. (Patient not taking: Reported on 9/30/2024), Disp: , Rfl:     ibuprofen (ADVIL,MOTRIN) 200 MG tablet, Take 200 mg by mouth every 6 (six) hours as needed for Pain., Disp: , Rfl:     methylPREDNISolone (MEDROL DOSEPACK) 4 mg tablet, use as directed, Disp: 21 each, Rfl: 0    ALLERGIES:   Review of patient's allergies indicates:  No Known Allergies     PHYSICAL EXAMINATION:  Ht 6' 5" (1.956 m)   Wt 86 kg (189 lb 9.5 oz)   BMI 22.48 kg/m²   General: Well-developed well-nourished 19 y.o. malein no acute distress   Cardiovascular: Regular rhythm by palpation of distal pulse, normal color and temperature, no concerning varicosities on symptomatic side   Lungs: No labored breathing or wheezing appreciated   Neuro: Alert and oriented ×3   Psychiatric: well oriented to person, place and time, demonstrates normal mood and affect   Skin: No rashes, lesions or ulcers, normal temperature, turgor, and texture on involved extremity    Ortho/SPM Exam  Examination of the right knee again demonstrates intact extensor mechanism.  No effusion.  He does have some laterally based soft tissue swelling at the level of the ITB band.  There is some tenderness and pain to palpation of the distal ITB band consistent with distal ITB band syndrome.  Otherwise full range of motion of the knee.  Negative Garth's maneuver.    IMAGING:  X-rays 01/30/25including standing, weight bearing AP and flexion bilateral knees, RIGHT knee lateral and sunrise views ordered and images reviewed by me show:    No acute abnormality    MRI 02/02/25 of right knee reviewed by me and discussed with patient. Study shows:   No evidence for lateral meniscal tear or fibular " collateral ligament injury.     ASSESSMENT:      ICD-10-CM ICD-9-CM   1. Iliotibial band syndrome affecting lower leg, right  M76.31 728.89     PLAN:     Findings discussed with the patient.  On repeat exam, a bit more clearly this appears to be a distal ITB band syndrome issue.  Discussed treatment options to include local corticosteroid injection.  We were going to do the injection we would have to do a today to allow time for the steroid to work prior to his next pitching outing.  He wishes to hold off on that.  Maybe reassess after his Monday start.  In lieu of ibuprofen, I would like for him to take Indocin and Nexium.  Safe use instructions provided.  I will discuss with the  with regards to other modalities to include Thera gun and foam rolling.  They will keep us updated.    Procedures

## 2025-02-20 ENCOUNTER — OFFICE VISIT (OUTPATIENT)
Dept: SPORTS MEDICINE | Facility: CLINIC | Age: 20
End: 2025-02-20
Payer: COMMERCIAL

## 2025-02-20 VITALS — HEIGHT: 77 IN | BODY MASS INDEX: 22.39 KG/M2 | WEIGHT: 189.63 LBS

## 2025-02-20 DIAGNOSIS — M76.31 ILIOTIBIAL BAND SYNDROME AFFECTING LOWER LEG, RIGHT: Primary | ICD-10-CM

## 2025-02-20 RX ORDER — ESOMEPRAZOLE MAGNESIUM 40 MG/1
40 CAPSULE, DELAYED RELEASE ORAL
Qty: 20 CAPSULE | Refills: 1 | Status: SHIPPED | OUTPATIENT
Start: 2025-02-20 | End: 2026-02-20

## 2025-02-20 RX ORDER — INDOMETHACIN 75 MG/1
75 CAPSULE, EXTENDED RELEASE ORAL 2 TIMES DAILY
Qty: 40 CAPSULE | Refills: 1 | Status: SHIPPED | OUTPATIENT
Start: 2025-02-20

## 2025-02-21 ENCOUNTER — ATHLETIC TRAINING SESSION (OUTPATIENT)
Dept: SPORTS MEDICINE | Facility: CLINIC | Age: 20
End: 2025-02-21
Payer: COMMERCIAL

## 2025-02-21 DIAGNOSIS — M76.31 ILIOTIBIAL BAND SYNDROME AFFECTING LOWER LEG, RIGHT: Primary | ICD-10-CM

## 2025-02-21 DIAGNOSIS — Z00.00 HEALTHCARE MAINTENANCE: Primary | ICD-10-CM

## 2025-02-21 NOTE — PROGRESS NOTES
Reason for Encounter Follow-Up    Subjective:       Chief Complaint: Ammon Jones is a 19 y.o. male student at [employer]) who had concerns including Health Maintenance of the Right Upper Arm.    Ammon is a  at [employer]. He started having elbow pain towards the end of the summer and it worsened in the fall season. He has been going to formal PT and doing rehab in the [facility].     Handedness: right-handed  Sport played: baseball      Level: college      Position:pitcher      Pain    Injury        ROS              Objective:       General: Ammon is well-developed, well-nourished, appears stated age, in no acute distress, alert and oriented to time, place and person.     AT Session          Assessment:     Status: F - Full Participation    Date Seen:  02/09/2025-02/15/2025    Date of Injury:  NA    Date Out:  NA    Date Cleared:  NA        Treatment/Rehab/Maintenance:   Post Throw  Date: 02/14/2025    Shoulder CARs x 10  Half kneeling T spine opener x 10 each side   Half Kneeling T spine CARs x 10 each side   Cat cows x 10   Tea cups x 10   Diagonal pull a parts x 10   90/90 plyo ball catches x 10   Forward arm ball catches x 10   Lateral arm ball catches x 10      Pre-Throw  Date: 02/14/2025    I, Y, T, W with J bands 1 x 5   Throwing motion with J bands 1 x 55   Reverse throws 1 x 5   Pivot pick off holds 1 x 5   Roll in holds 1 x 5   Walking wind up 1 x 5       Ammon completed therapeutic exercises to develop strength:    Date: 02/14/2025    Exercises  Sets x Reps  Weight   UE nerve flosses  2 x 12    Wrist stretches (flexion and extension)  3 x 30s     Pronation/supination  4 x 12  8 lbs    Radial/ulnar deviation  4 x 12  8 lbs    D1 flexion  4 x 12  Blue theraband    D2 extension  4 x 12  Blue theraband    D1 extension  4 x 12  Blue theraband    D2 flexion  4 x 12  Blue theraband    Chest supported I, Y, T  4 x 8  3 lbs                  Plan:       1. Daily arm care work to keep  arm healthy during season.   2. Physician Referral: no  3. ED Referral:no  4. Parent/Guardian Notified: No  5. All questions were answered, ath. will contact me for questions or concerns in  the interim.  6.         Eligible to use School Insurance: Yes

## 2025-02-21 NOTE — PROGRESS NOTES
Reason for Encounter Follow-Up    Subjective:       Chief Complaint: Ammon Jones is a 19 y.o. male student at [employer]) who had concerns including Pain of the Right Knee.    Ammon reported R knee pain a few weeks ago. He presented similar to a lateral meniscus tear, but had no mechanism of injury. I sent him to see Dr. Waldron to make sure there is nothing torn and to find out if there were any other underlying issues. His MRI was clear and had no findings related to his knee. We will continue to work on mobility and strengthening the structures of the knee.     Handedness: right-handed  Sport played: baseball      Level: college      Position:pitcher      Pain    Injury        ROS              Objective:       General: Ammon is well-developed, well-nourished, appears stated age, in no acute distress, alert and oriented to time, place and person.     AT Session          Assessment:     Status: AT - Cleared to Exert    Date Seen:  02/16/2025-02/22/2025    Date of Injury:  01/18/2025    Date Out:  01/23/2025    Date Cleared:  NA        Treatment/Rehab/Maintenance:   Ammon completed therapeutic exercises to develop strength and ROM:    Date: 02/22/2025    Exercises  Sets x Reps  Weight   Hamstring floss  3 x 10     Cross body stretch w/ knee extension  3 x 10     Cross body stretch hold  3 x 30s    Dean hip dips  3 x 10     Quadruped hip circles  3 x 10     Donkey kicks  3 x 10  3 lbs    Fire hydrants  3 x 10  3 lbs   Foam roll IT band and hamstring  1 min                         Ammon completed therapeutic exercises to develop ROM:    Date: 02/19/2025    Exercises  Sets x Reps  Weight   Long arc quads  3 x 10  2 lbs    Short arc quads  3 x 10  2 lbs    Hamstring floss  3 x 10     Split squat pulses  3 x 10     Single leg RDL  3 x 10     Calf raises double leg  3 x 12    Manual ankle, knee, and hip mobility       Modality: GameReady  Date 02/17/2025, R knee, and Duration 15 min          Plan:        1. Daily rehab and treatment to keep knee moving and reduce pain. Will create a new plan after follow up with Dr. Waldron.  2. Physician Referral: yes  3. ED Referral:no  4. Parent/Guardian Notified: Yes Parent Name: Aimee Jones  Date 01/26/2025  Time: 11:00 am  Method of Communication: Phone Call  5. All questions were answered, ath. will contact me for questions or concerns in  the interim.  6.         Eligible to use School Insurance: No, not a school related injury                   no

## 2025-03-05 ENCOUNTER — ATHLETIC TRAINING SESSION (OUTPATIENT)
Dept: SPORTS MEDICINE | Facility: CLINIC | Age: 20
End: 2025-03-05
Payer: COMMERCIAL

## 2025-03-05 DIAGNOSIS — Z00.00 HEALTHCARE MAINTENANCE: Primary | ICD-10-CM

## 2025-03-05 DIAGNOSIS — M76.31 ILIOTIBIAL BAND SYNDROME AFFECTING LOWER LEG, RIGHT: Primary | ICD-10-CM

## 2025-03-05 NOTE — PROGRESS NOTES
Reason for Encounter Follow-Up    Subjective:       Chief Complaint: Ammon Jones is a 19 y.o. male student at [employer]) who had concerns including Pain of the Right Knee.    Ammon reported R knee pain a few weeks ago. He had a clear MRI, Dr. Waldron had him take a steroid pack to help with inflammation and pain. When Ammon had his second follow up with Dr. Waldron he presented with swelling over his IT band. Dr. Waldron diagnosed him with IT band syndrome and we discussed ways to make his rehab target his IT band more.     Handedness: right-handed  Sport played: baseball      Level: college      Position:pitcher      Pain    Injury        ROS              Objective:       General: Ammon is well-developed, well-nourished, appears stated age, in no acute distress, alert and oriented to time, place and person.     AT Session          Assessment:     Status: AT - Cleared to Exert    Date Seen:  02/23/2025-03/01/2025    Date of Injury:  01/18/2025    Date Out:  01/23/2025    Date Cleared:  NA        Treatment/Rehab/Maintenance:   Ammon completed therapeutic exercises to develop strength and ROM:    Date: 02/24/2025    Exercises  Sets x Reps  Weight   Hamstring floss  3 x 10     Cross body stretch w/ knee extension  3 x 10     Cross body stretch hold  3 x 30s    Dean hip dips  3 x 10     Quadruped hip circles  3 x 10     Donkey kicks  3 x 10  3 lbs    Fire hydrants  3 x 10  3 lbs   Foam roll IT band and hamstring  1 min                       Plan:       1. Daily rehab and treatment with help with IT band syndrome.  2. Physician Referral: yes  3. ED Referral:no  4. Parent/Guardian Notified: Yes Parent Name: Aimee Jones  Date 01/26/2025  Time: 11:00 am  Method of Communication: Phone Call  5. All questions were answered, ath. will contact me for questions or concerns in  the interim.  6.         Eligible to use School Insurance: No, not a school related injury

## 2025-03-05 NOTE — PROGRESS NOTES
Reason for Encounter N/A    Subjective:       Chief Complaint: Ammon Jones is a 19 y.o. male student at [employer]) who had concerns including Health Maintenance of the Right Upper Arm.    Ammon is a  at [employer]. He is coming in to do routine arm care for his throwing arm. He has a history of scapular dyskinesis and bicep tendonitis. Neither are an issue currently, but his arm care is built around preventing these from becoming an issue again.     Handedness: right-handed  Sport played: baseball      Level: college      Position:pitcher          ROS              Objective:       General: Ammon is well-developed, well-nourished, appears stated age, in no acute distress, alert and oriented to time, place and person.     AT Session          Assessment:     Status: F - Full Participation    Date Seen:  02/23/2025-03/01/2025    Date of Injury:  NA    Date Out:  NA    Date Cleared:  NA        Treatment/Rehab/Maintenance:   Ammon completed therapeutic exercises to develop strength:    Date: 02/25/2025    Exercises  Sets x Reps  Weight   Band snow angles  4 x 12  Red J bands    Behind the back lift offs  4 x 12  Green theraband    Prone Plyo ball I, Y, T  3 x 8  3.3 lbs    Wrist flexion/ extension  4 x 12  4 lbs    Finger flexion/extension  4 x 12  Green web   Wrist stretches on table  3 x 30s                                        Plan:       1. Continue daily arm care. Do pre and post throw programs when throwing off the mound.   2. Physician Referral: no  3. ED Referral:no  4. Parent/Guardian Notified: No  5. All questions were answered, ath. will contact me for questions or concerns in  the interim.  6.         Eligible to use School Insurance: Yes

## 2025-03-11 ENCOUNTER — ATHLETIC TRAINING SESSION (OUTPATIENT)
Dept: SPORTS MEDICINE | Facility: CLINIC | Age: 20
End: 2025-03-11
Payer: COMMERCIAL

## 2025-03-11 DIAGNOSIS — Z00.00 HEALTHCARE MAINTENANCE: Primary | ICD-10-CM

## 2025-03-11 DIAGNOSIS — M76.31 ILIOTIBIAL BAND SYNDROME AFFECTING LOWER LEG, RIGHT: Primary | ICD-10-CM

## 2025-03-11 NOTE — PROGRESS NOTES
Reason for Encounter Follow-Up    Subjective:       Chief Complaint: Ammon Jones is a 20 y.o. male student at [employer]) who had concerns including Health Maintenance of the Right Upper Arm.    Ammon is a  at [employer]. He started having elbow pain towards the end of the summer and it worsened in the fall season. He has been going to formal PT and doing rehab in the [facility].     Handedness: right-handed  Sport played: baseball      Level: college      Position:pitcher      Pain    Injury        ROS              Objective:       General: Ammon is well-developed, well-nourished, appears stated age, in no acute distress, alert and oriented to time, place and person.     AT Session          Assessment:     Status: F - Full Participation    Date Seen:  03/02/2025-03/08/2025    Date of Injury:  NA    Date Out:  NA    Date Cleared:  NA        Treatment/Rehab/Maintenance:   Modality: Scrape  Date 03/08/2025, Body Location R elbow, and Duration 5 min        Ammon completed therapeutic exercises to develop strength:    Date: 03/06/2025    Exercises  Sets x Reps  Weight   UE nerve flosses  2 x 12    Wrist stretches (flexion and extension)  3 x 30s     Pronation/supination  4 x 12  8 lbs    Radial/ulnar deviation  4 x 12  8 lbs    D1 flexion  4 x 12  Blue theraband    D2 extension  4 x 12  Blue theraband    D1 extension  4 x 12  Blue theraband    D2 flexion  4 x 12  Blue theraband    Chest supported I, Y, T  4 x 8  3 lbs                Post Throw  Date: 03/03/2025    Shoulder CARs x 10  Half kneeling T spine opener x 10 each side   Half Kneeling T spine CARs x 10 each side   Cat cows x 10   Tea cups x 10   Diagonal pull a parts x 10   90/90 plyo ball catches x 10   Forward arm ball catches x 10   Lateral arm ball catches x 10      Pre-Throw  Date: 03/03/2025    I, Y, T, W with J bands 1 x 5   Throwing motion with J bands 1 x 55   Reverse throws 1 x 5   Pivot pick off holds 1 x 5   Roll in holds  1 x 5   Walking wind up 1 x 5       Plan:       1. Daily arm care work to keep arm healthy during season.   2. Physician Referral: no  3. ED Referral:no  4. Parent/Guardian Notified: No  5. All questions were answered, ath. will contact me for questions or concerns in  the interim.  6.         Eligible to use School Insurance: Yes

## 2025-03-11 NOTE — PROGRESS NOTES
Reason for Encounter Follow-Up    Subjective:       Chief Complaint: Ammon Jones is a 20 y.o. male student at [employer]) who had concerns including Injury of the Right Knee.    Ammon reported R knee pain a few weeks ago. He had a clear MRI, Dr. Waldron had him take a steroid pack to help with inflammation and pain. When Ammon had his second follow up with Dr. Waldron he presented with swelling over his IT band. Dr. Waldron diagnosed him with IT band syndrome and we discussed ways to make his rehab target his IT band more.     Handedness: right-handed  Sport played: baseball      Level: college      Position:pitcher      Injury    Pain        ROS              Objective:       General: Ammon is well-developed, well-nourished, appears stated age, in no acute distress, alert and oriented to time, place and person.     AT Session          Assessment:     Status: AT - Cleared to Exert    Date Seen:  03/09/2025-03/15/2025    Date of Injury:  01/18/2025    Date Out:  01/23/2025    Date Cleared:  NA        Treatment/Rehab/Maintenance:   Ammon completed therapeutic exercises to develop strength and ROM:    Date: 03/11/2025    Exercises  Sets x Reps  Weight   Hamstring floss  3 x 10     Cross body stretch w/ knee extension  3 x 10     Cross body stretch hold  3 x 30s    Dean hip dips  3 x 10     Quadruped hip circles  3 x 10     Donkey kicks  3 x 10  3 lbs    Fire hydrants  3 x 10  3 lbs   Foam roll IT band and hamstring  1 min                       Plan:       1. Daily rehab and treatment with help with IT band syndrome.  2. Physician Referral: yes  3. ED Referral:no  4. Parent/Guardian Notified: Yes Parent Name: Aimee Jones  Date 01/26/2025  Time: 11:00 am  Method of Communication: Phone Call  5. All questions were answered, ath. will contact me for questions or concerns in  the interim.  6.         Eligible to use School Insurance: No, not a school related injury

## 2025-03-11 NOTE — PROGRESS NOTES
Reason for Encounter Follow-Up    Subjective:       Chief Complaint: Ammon Jones is a 20 y.o. male student at [employer]) who had concerns including Injury of the Right Knee.    Ammon reported R knee pain a few weeks ago. He had a clear MRI, Dr. Waldron had him take a steroid pack to help with inflammation and pain. When Ammon had his second follow up with Dr. Waldron he presented with swelling over his IT band. Dr. Waldron diagnosed him with IT band syndrome and we discussed ways to make his rehab target his IT band more.     Handedness: right-handed  Sport played: baseball      Level: college      Position:pitcher      Injury    Pain        ROS              Objective:       General: Ammon is well-developed, well-nourished, appears stated age, in no acute distress, alert and oriented to time, place and person.     AT Session          Assessment:     Status: AT - Cleared to Exert    Date Seen:  03/02/2025-03/08/2025    Date of Injury:  01/18/2025    Date Out:  01/23/2025    Date Cleared:  NA        Treatment/Rehab/Maintenance:   Ammon completed therapeutic exercises to develop strength and ROM:    Date: 03/06/2025    Exercises  Sets x Reps  Weight   Hamstring floss  3 x 10     Cross body stretch w/ knee extension  3 x 10     Cross body stretch hold  3 x 30s    Dean hip dips  3 x 10     Quadruped hip circles  3 x 10     Donkey kicks  3 x 10  3 lbs    Fire hydrants  3 x 10  3 lbs   Foam roll IT band and hamstring  1 min                       Plan:       1. Daily rehab and treatment with help with IT band syndrome.  2. Physician Referral: yes  3. ED Referral:no  4. Parent/Guardian Notified: Yes Parent Name: Aimee Jones  Date 01/26/2025  Time: 11:00 am  Method of Communication: Phone Call  5. All questions were answered, ath. will contact me for questions or concerns in  the interim.  6.         Eligible to use School Insurance: No, not a school related injury

## 2025-03-17 NOTE — PROGRESS NOTES
CC: RIGHT elbow pain    Ammon Jones, presents today for follow up appointment of his right elbow. He is accompanied by his mother.  Previous medially based right elbow pain with negative MR arthrogram dated 9/26/24.  The patient was treated over the off season with conservative measures which initially was successful.  The patient was able to return to play without any significant elbow issues.  Last PT session with [facility] at [facility] for elbow was 01/30/25.  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  He then felt some radiating numbness and tingling down the forearm into the ulnar aspect of the hand.  He attempted to pitch again but his velocity decreased significantly and he was pulled thereafter.  He now comes into clinic with continued soreness and pain over the medial aspect of the elbow.  No ulnar nerve complaints at this time.    Prior Hx 2/20/2025:   Ammon Jones, presents today for follow up appointment of his right knee. Patient does not report any new incidents or injuries since their last appointment. He has attended 2 PT sessions at [facility] with [facility].  Overall much better compared to before.  Accompanied by his mother.  Was able to pitch at the end of last week.  Next pitching outing is scheduled for this coming Monday.  Medrol Dosepak was helpful.  Taking over-the-counter ibuprofen for now.    Prior Hx 9/30/2024:  Ammon Benjamin Robert who presents for MRI arthrogram review of right elbow.  Evaluation for UCL pathology. Patient does not report any new incidents or injuries since their last appointment. Pain and symptoms remain unchanged since his last appointment. Here today to discuss treatment options.     Prior Hx 9/23/2024:  Ammon Benjamin Robert, presents today for follow up appointment of his right knee.  Patient does not report any new incidents or injuries since their last appointment. He has attended 2 PT sessions at [facility] with [facility].  Overall much better compared to before.  Accompanied by his mother.  Was able to pitch at the end of last week.  Next pitching outing is scheduled for this coming Monday.  Medrol Dosepak was helpful.  Taking over-the-counter ibuprofen for now.  19 y.o. right hand dominant Male presents as a new patient to me. 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  Accompanied by his mother today.  He reports a 2 month history of off and on medially based elbow pain.  Also localizes over the anterior distal biceps region.  Pain typically present on follow-through and ball release.  Insidious onset.  Denies any specific discrete injury moment.  Denies loss of velocity but has had some difficulty spotting his pitches.  More recently he also has had some ulnar nerve symptoms with throwing.  His mom attributes some of his recent elbow symptoms to a change in his training regimen which has included some increased weightlifting in conjunction with baseball participation.  He denies any previous right elbow issues otherwise.  He was seen previously for his right shoulder in 2023.  There was some suggestion possibly of some thoracic outlet type complaints.  Did better with PT. he denies any elbow pain with day-to-day activity.  He has become a bit frustrated however with his ongoing symptoms.  Throws from the 3/4 slot.  Velocity around mid 80s.    PAST MEDICAL HISTORY:   No past medical history on file.    PAST SURGICAL HISTORY:  Past Surgical History:   Procedure Laterality Date    ADENOIDECTOMY Bilateral     TYMPANOSTOMY TUBE PLACEMENT Bilateral      FAMILY HISTORY:  Family History   Problem Relation Name Age of Onset    No Known Problems Mother      No Known Problems Father       MEDICATIONS:    Current Outpatient Medications:     celecoxib (CELEBREX) 200 MG capsule, Take 1 capsule (200  "mg total) by mouth once daily., Disp: 30 capsule, Rfl: 1    dextromethorphan-guaiFENesin  mg (MUCINEX DM)  mg per 12 hr tablet, Take 1 tablet by mouth every 12 (twelve) hours. (Patient not taking: Reported on 9/30/2024), Disp: , Rfl:     esomeprazole (NEXIUM) 40 MG capsule, Take 1 capsule (40 mg total) by mouth before breakfast., Disp: 20 capsule, Rfl: 1    ibuprofen (ADVIL,MOTRIN) 200 MG tablet, Take 200 mg by mouth every 6 (six) hours as needed for Pain., Disp: , Rfl:     indomethacin (INDOCIN SR) 75 mg CpSR CR capsule, Take 1 capsule (75 mg total) by mouth 2 (two) times daily., Disp: 40 capsule, Rfl: 1    methylPREDNISolone (MEDROL DOSEPACK) 4 mg tablet, use as directed, Disp: 21 each, Rfl: 0    ALLERGIES:  Review of patient's allergies indicates:  No Known Allergies    REVIEW OF SYSTEMS:  Constitution: Negative. Negative for chills, fever and night sweats.    Hematologic/Lymphatic: Negative for bleeding problem. Does not bruise/bleed easily.   Skin: Negative for dry skin, itching and rash.   Musculoskeletal: Negative for falls. Positive for right elbow pain and muscle weakness.     All other review of symptoms were reviewed and found to be noncontributory.     PHYSICAL EXAMINATION:  Vitals:  Ht 6' 5" (1.956 m)   Wt 86 kg (189 lb 9.5 oz)   BMI 22.48 kg/m²    General: Well-developed well-nourished 20 y.o. malein no acute distress   Cardiovascular: Regular rhythm by palpation of distal pulse, normal color and temperature, no concerning varicosities on symptomatic side   Lungs: No labored breathing or wheezing appreciated   Neuro: Alert and oriented ×3   Psychiatric: well oriented to person, place and time, demonstrates normal mood and affect   Skin: No rashes, lesions or ulcers, normal temperature, turgor, and texture on uninvolved extremity    Ortho/SPM Exam  Examination of the right elbow demonstrates perhaps some mild swelling over the medial aspect of the elbow compared to the contralateral side.  " He does some generalized soreness and pain over the medial elbow.  Pain to palpation over the UCL and cubital tunnel.  Also painful over the common flexor bundle and medial epicondyle.  Intact distal biceps and triceps.  He demonstrates full elbow flexion.  Some hesitation towards terminal extension of the elbow on exam.  Positive bounce test for pain.  Weakness with resisted elbow extended wrist flexion.  Positive milk test for pain medially.  Positive moving valgus stress test for pain.    IMAGING:  Repeat x-ray including AP, Lateral and radial capitallar views of the right elbow are ordered / images reviewed by me:   No significant abnormality seen.    MRI arthrogram 09/26/24 of right elbow reviewed by me and discussed with patient. Study shows:   1. Normal appearance of the ulnar collateral ligament.  2. Mild insertional biceps tendinosis.    ASSESSMENT:      ICD-10-CM ICD-9-CM   1. Elbow injury, right, initial encounter  S59.901A 959.3   2. Traumatic pain of elbow  M25.529 719.42   3. Right elbow pain  M25.521 719.42     PLAN:     The patient presents with a re-injury to his right throwing elbow.  He felt a painful pop over the medial aspect of the elbow.  Clinical findings are concerning for UCL injury.  Non arthrogram MRI is indicated for further assessment.  I will see him back after that to discuss results and treatment options.  Out of sports for now.  No throwing.  Continue Tylenol and prescription given for Celebrex.  Ice.      Procedures

## 2025-03-18 ENCOUNTER — OFFICE VISIT (OUTPATIENT)
Dept: SPORTS MEDICINE | Facility: CLINIC | Age: 20
End: 2025-03-18
Payer: COMMERCIAL

## 2025-03-18 ENCOUNTER — HOSPITAL ENCOUNTER (OUTPATIENT)
Dept: RADIOLOGY | Facility: HOSPITAL | Age: 20
Discharge: HOME | End: 2025-03-18
Attending: ORTHOPAEDIC SURGERY
Payer: COMMERCIAL

## 2025-03-18 ENCOUNTER — ATHLETIC TRAINING SESSION (OUTPATIENT)
Dept: SPORTS MEDICINE | Facility: CLINIC | Age: 20
End: 2025-03-18
Payer: COMMERCIAL

## 2025-03-18 VITALS — WEIGHT: 189.63 LBS | HEIGHT: 77 IN | BODY MASS INDEX: 22.39 KG/M2

## 2025-03-18 DIAGNOSIS — M25.521 RIGHT ELBOW PAIN: ICD-10-CM

## 2025-03-18 DIAGNOSIS — M25.521 CHRONIC ELBOW PAIN, RIGHT: ICD-10-CM

## 2025-03-18 DIAGNOSIS — S59.901A ELBOW INJURY, RIGHT, INITIAL ENCOUNTER: Primary | ICD-10-CM

## 2025-03-18 DIAGNOSIS — G89.29 CHRONIC ELBOW PAIN, RIGHT: ICD-10-CM

## 2025-03-18 DIAGNOSIS — M25.521 RIGHT ELBOW PAIN: Primary | ICD-10-CM

## 2025-03-18 DIAGNOSIS — M25.529 TRAUMATIC PAIN OF ELBOW: ICD-10-CM

## 2025-03-18 PROCEDURE — 99999 PR PBB SHADOW E&M-EST. PATIENT-LVL III: CPT | Mod: PBBFAC,,, | Performed by: ORTHOPAEDIC SURGERY

## 2025-03-18 PROCEDURE — 1159F MED LIST DOCD IN RCRD: CPT | Mod: CPTII,S$GLB,, | Performed by: ORTHOPAEDIC SURGERY

## 2025-03-18 PROCEDURE — 73080 X-RAY EXAM OF ELBOW: CPT | Mod: TC,RT

## 2025-03-18 PROCEDURE — 73080 X-RAY EXAM OF ELBOW: CPT | Mod: 26,RT,, | Performed by: RADIOLOGY

## 2025-03-18 PROCEDURE — 99214 OFFICE O/P EST MOD 30 MIN: CPT | Mod: S$GLB,,, | Performed by: ORTHOPAEDIC SURGERY

## 2025-03-18 PROCEDURE — 3008F BODY MASS INDEX DOCD: CPT | Mod: CPTII,S$GLB,, | Performed by: ORTHOPAEDIC SURGERY

## 2025-03-18 RX ORDER — CELECOXIB 200 MG/1
200 CAPSULE ORAL DAILY
Qty: 30 CAPSULE | Refills: 1 | Status: CANCELLED | OUTPATIENT
Start: 2025-03-18

## 2025-03-18 RX ORDER — CELECOXIB 200 MG/1
200 CAPSULE ORAL DAILY
Qty: 30 CAPSULE | Refills: 1 | Status: SHIPPED | OUTPATIENT
Start: 2025-03-18

## 2025-03-18 NOTE — PROGRESS NOTES
Reason for Encounter New Injury    Subjective:       Chief Complaint: Ammon Jones is a 20 y.o. male student at [employer]) who had concerns including Injury of the Right Elbow.    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 During the game on 03/13/2025 Ammon came into the dug out after the inning ended and said he felt a pop in his right elbow during his 2nd to last pitch of the inning. He reported having around the medial aspect of the elbow with pain around distal bicep and triceps and well. When I first tried to do my exam he was in too much pain for me to get a true read of what was happening. After letting him rest and relax I redid my evaluation. Ammon stated he felt like he torn his UCL and he knew something was wrong. He was pulled from play after he came in the dugout in that level of pain. Ammon has a hx of bicep tendonitis and nerve issues during the summer/fall of 2024. He did PT and rehab in the [facility] for that injury and was fully cleared for return to play for the spring.     Handedness: right-handed  Sport played: baseball      Level: college      Position:pitcher      Injury        ROS              Objective:       General: Ammon is well-developed, well-nourished, appears stated age, in no acute distress, alert and oriented to time, place and person.                 Right Hand/Wrist Exam     Range of Motion   The patient has normal right hand/wrist ROM.    Other     Neuorologic Exam    Median Distribution: normal  Ulnar Distribution: normal  Radial Distribution: normal      Right Elbow Exam     Inspection   Scars: absent  Effusion: absent  Bruising: absent  Deformity: absent  Atrophy: absent    Pain   The patient exhibits pain of the medial epicondyle, triceps insertion and ulnar collateral ligament    Swelling   The patient is swollen on the ulnar collateral ligament    Tenderness   The patient is tender to palpation of the medial epicondyle.     Range of Motion   The patient  has normal right elbow ROM.    Tests   Varus: positive  Valgus: negative  Tinel's sign (cubital tunnel): negative    Other   Sensation: normal    Comments:  Felt a crunch on the first motion during the milking test all 3 times I performed the test.   I performed the milking test in the dugout, after the games at the hotel, and after dinner that night. All 3 times he was positive for pain and the crunching but I did not feel laxity.       Left Elbow Exam   Left elbow exam is normal.        Muscle Strength   Right Upper Extremity   : 3/5   Pinch Mechanism: 3/5  Elbow Pronation:  5/5   Elbow Supination:  5/5   Elbow Extension: 4/5  Elbow Flexion: 4/5    Vascular Exam     Right Pulses      Radial:                    2+      Capillary Refill  Right Hand: normal capillary refill                Assessment:     Status: O - Out    Date Seen:  03/13/2025    Date of Injury:  03/13/2025    Date Out:  03/13/2025    Date Cleared:  NA        Treatment/Rehab/Maintenance:     03/13/2025: Ice after evaluation and testing for 15 min. Given 1 pack of ibuprofen. Ice again after dinner for 15 min       Plan:       1. Continue taking ibuprofen and Tylenol through out the trip, alternating each dose. Hold from play until he sees Dr. Waldron. Ice if needed for the remainder of the trip.   2. Physician Referral: yes  3. ED Referral:no  4. Parent/Guardian Notified: Yes Parent Name: Johann Jones  Date 03/13/2025  Time: 5:00 pm  Method of Communication: In person  5. All questions were answered, ath. will contact me for questions or concerns in  the interim.  6.         Eligible to use School Insurance: Yes

## 2025-03-19 ENCOUNTER — HOSPITAL ENCOUNTER (OUTPATIENT)
Dept: RADIOLOGY | Facility: HOSPITAL | Age: 20
Discharge: HOME | End: 2025-03-19
Attending: ORTHOPAEDIC SURGERY
Payer: COMMERCIAL

## 2025-03-19 DIAGNOSIS — M25.529 TRAUMATIC PAIN OF ELBOW: ICD-10-CM

## 2025-03-19 DIAGNOSIS — S59.901A ELBOW INJURY, RIGHT, INITIAL ENCOUNTER: ICD-10-CM

## 2025-03-19 DIAGNOSIS — M25.521 RIGHT ELBOW PAIN: ICD-10-CM

## 2025-03-19 PROCEDURE — 73221 MRI JOINT UPR EXTREM W/O DYE: CPT | Mod: TC,RT

## 2025-03-19 PROCEDURE — 73221 MRI JOINT UPR EXTREM W/O DYE: CPT | Mod: 26,RT,, | Performed by: RADIOLOGY

## 2025-03-19 NOTE — PROGRESS NOTES
CC: Right elbow pain MRI results review    Ammon Jones who presents for MRI review of right elbow. Concern for UCL tear.  See prior notes for details.  Accompanied by his mother today.    Prior Hx 3/18/2025:  Ammon Jones, presents today for follow up appointment of his right elbow. He is accompanied by his mother.  Previous medially based right elbow pain with negative MR arthrogram dated 9/26/24.  The patient was treated over the off season with conservative measures which initially was successful.  The patient was able to return to play without any significant elbow issues.  Last PT session with [facility] at [facility] for elbow was 01/30/25.  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  He then felt some radiating numbness and tingling down the forearm into the ulnar aspect of the hand.  He attempted to pitch again but his velocity decreased significantly and he was pulled thereafter.  He now comes into clinic with continued soreness and pain over the medial aspect of the elbow.  No ulnar nerve complaints at this time.    Prior Hx 2/20/2025:   Ammon Jones, presents today for follow up appointment of his right knee. Patient does not report any new incidents or injuries since their last appointment. He has attended 2 PT sessions at [facility] with [facility].  Overall much better compared to before.  Accompanied by his mother.  Was able to pitch at the end of last week.  Next pitching outing is scheduled for this coming Monday.  Medrol Dosepak was helpful.  Taking over-the-counter ibuprofen for now.    Prior Hx 9/30/2024:  Ammon Jones who presents for MRI arthrogram review of right elbow.  Evaluation for UCL pathology. Patient does not report any new incidents or injuries since their last appointment. Pain and symptoms  remain unchanged since his last appointment. Here today to discuss treatment options.     Prior Hx 9/23/2024:  Ammon Jones, presents today for follow up appointment of his right knee. Patient does not report any new incidents or injuries since their last appointment. He has attended 2 PT sessions at [facility] with [facility].  Overall much better compared to before.  Accompanied by his mother.  Was able to pitch at the end of last week.  Next pitching outing is scheduled for this coming Monday.  Medrol Dosepak was helpful.  Taking over-the-counter ibuprofen for now.  19 y.o. right hand dominant Male presents as a new patient to me. 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  Accompanied by his mother today.  He reports a 2 month history of off and on medially based elbow pain.  Also localizes over the anterior distal biceps region.  Pain typically present on follow-through and ball release.  Insidious onset.  Denies any specific discrete injury moment.  Denies loss of velocity but has had some difficulty spotting his pitches.  More recently he also has had some ulnar nerve symptoms with throwing.  His mom attributes some of his recent elbow symptoms to a change in his training regimen which has included some increased weightlifting in conjunction with baseball participation.  He denies any previous right elbow issues otherwise.  He was seen previously for his right shoulder in 2023.  There was some suggestion possibly of some thoracic outlet type complaints.  Did better with PT. he denies any elbow pain with day-to-day activity.  He has become a bit frustrated however with his ongoing symptoms.  Throws from the 3/4 slot.  Velocity around mid 80s.    PAST MEDICAL HISTORY:   No past medical history on file.    PAST SURGICAL HISTORY:  Past Surgical History:   Procedure Laterality Date    ADENOIDECTOMY Bilateral     TYMPANOSTOMY TUBE PLACEMENT Bilateral      FAMILY HISTORY:  Family History   Problem Relation  Name Age of Onset    No Known Problems Mother      No Known Problems Father       MEDICATIONS:    Current Outpatient Medications:     celecoxib (CELEBREX) 200 MG capsule, Take 1 capsule (200 mg total) by mouth once daily., Disp: 30 capsule, Rfl: 1    dextromethorphan-guaiFENesin  mg (MUCINEX DM)  mg per 12 hr tablet, Take 1 tablet by mouth every 12 (twelve) hours. (Patient not taking: Reported on 9/30/2024), Disp: , Rfl:     esomeprazole (NEXIUM) 40 MG capsule, Take 1 capsule (40 mg total) by mouth before breakfast., Disp: 20 capsule, Rfl: 1    ibuprofen (ADVIL,MOTRIN) 200 MG tablet, Take 200 mg by mouth every 6 (six) hours as needed for Pain., Disp: , Rfl:     indomethacin (INDOCIN SR) 75 mg CpSR CR capsule, Take 1 capsule (75 mg total) by mouth 2 (two) times daily., Disp: 40 capsule, Rfl: 1    methylPREDNISolone (MEDROL DOSEPACK) 4 mg tablet, use as directed, Disp: 21 each, Rfl: 0    ALLERGIES:  Review of patient's allergies indicates:  No Known Allergies    REVIEW OF SYSTEMS:  Constitution: Negative. Negative for chills, fever and night sweats.    Hematologic/Lymphatic: Negative for bleeding problem. Does not bruise/bleed easily.   Skin: Negative for dry skin, itching and rash.   Musculoskeletal: Negative for falls. Positive for right elbow pain and muscle weakness.     All other review of symptoms were reviewed and found to be noncontributory.     PHYSICAL EXAMINATION:  Vitals:  There were no vitals taken for this visit.   General: Well-developed well-nourished 20 y.o. malein no acute distress   Cardiovascular: Regular rhythm by palpation of distal pulse, normal color and temperature, no concerning varicosities on symptomatic side   Lungs: No labored breathing or wheezing appreciated   Neuro: Alert and oriented ×3   Psychiatric: well oriented to person, place and time, demonstrates normal mood and affect   Skin: No rashes, lesions or ulcers, normal temperature, turgor, and texture on uninvolved  extremity    Ortho/SPM Exam  Examination of the right elbow demonstrates improved total arc of motion.  Negative milk test today on repeat testing.  The patient does have an intermittently subluxating ulnar nerve.  I am able to palpated and also at one moment able to visualize it.  Mildly positive Tinel sign.  Intact intrinsic strength compared to the contralateral side.  Intact wrist flexor strength to resisted testing.    IMAGING:  Repeat x-ray including AP, Lateral and radial capitallar views of the right elbow are ordered / images reviewed by me:   No significant abnormality seen.    MRI arthrogram 09/26/24 of right elbow reviewed by me and discussed with patient. Study shows:   1. Normal appearance of the ulnar collateral ligament.  2. Mild insertional biceps tendinosis.    MRI 03/19/25 of right elbow reviewed by me and discussed with patient. Study shows:   LIGAMENTS: Ulnar collateral, lateral ulnar collateral radial collateral and annular ligaments are intact.     TENDONS: Distal biceps, brachialis, common flexor, common extensor and distal triceps tendons are intact.     BONES: No acute fractures.  No avascular necrosis.  No marrow infiltrative process.     JOINTS/CARTILAGE: No partial or full-thickness chondral defects.  No osteochondral lesions.  No joint effusion.     MUSCLES: Normal bulk and signal intensity.  No accessory muscles.     MISCELLANEOUS: Median, ulnar and radial nerves demonstrate normal contour and signal intensity.  Lacertus fibrosus is intact.  Slight prominence of the posterolateral plica.  Subcutaneous soft tissues appear within normal limits.    ASSESSMENT:      ICD-10-CM ICD-9-CM   1. Lesion of right ulnar nerve  G56.21 354.2       PLAN:     Imaging reviewed with the patient in his mother.  No internal derangement.  On repeat exam today I do appreciate intermittent subluxation of the ulnar nerve.  I do think this is likely what he felt at the time of the reported injury.  There was a  neurogenic symptoms.  He does have a mildly positive Tinel sign today.  I do think we can manage this conservatively.  He has had no such popping or prominent neurogenic symptoms in the past.  Perhaps some mild symptoms.  Generally speaking he responded well to conservative treatment in the past.  Plan to give this the remainder of week for rest.  Continue Celebrex.  Can begin throwing again progressively next week with a goal of returning to the mount as early as early April depending upon how he responds.  We did discuss that open ulnar nerve transposition as an option for continued bothersome subluxating ulnar nerve complaints.  My hope would be that we can avoid that.  They are in agreement.  I will see him back in 2-3 weeks for recheck.  We did discuss the potential benefit of a nighttime cubital tunnel splint.  It sounds like he has had perhaps some mild ulnar nerve neurogenic complaints some mornings but nothing too significant.    Procedures

## 2025-03-20 ENCOUNTER — OFFICE VISIT (OUTPATIENT)
Dept: SPORTS MEDICINE | Facility: CLINIC | Age: 20
End: 2025-03-20
Payer: COMMERCIAL

## 2025-03-20 DIAGNOSIS — G56.21 LESION OF RIGHT ULNAR NERVE: Primary | ICD-10-CM

## 2025-03-20 PROCEDURE — 99999 PR PBB SHADOW E&M-EST. PATIENT-LVL I: CPT | Mod: PBBFAC,,, | Performed by: ORTHOPAEDIC SURGERY

## 2025-03-20 PROCEDURE — 99214 OFFICE O/P EST MOD 30 MIN: CPT | Mod: S$GLB,,, | Performed by: ORTHOPAEDIC SURGERY

## 2025-03-24 ENCOUNTER — TELEPHONE (OUTPATIENT)
Dept: SPORTS MEDICINE | Facility: CLINIC | Age: 20
End: 2025-03-24
Payer: COMMERCIAL

## 2025-03-24 ENCOUNTER — OFFICE VISIT (OUTPATIENT)
Dept: SPORTS MEDICINE | Facility: CLINIC | Age: 20
End: 2025-03-24
Payer: COMMERCIAL

## 2025-03-24 VITALS
WEIGHT: 186.94 LBS | HEIGHT: 77 IN | DIASTOLIC BLOOD PRESSURE: 72 MMHG | BODY MASS INDEX: 22.07 KG/M2 | HEART RATE: 81 BPM | SYSTOLIC BLOOD PRESSURE: 118 MMHG

## 2025-03-24 DIAGNOSIS — G56.21 LESION OF RIGHT ULNAR NERVE: Primary | ICD-10-CM

## 2025-03-24 DIAGNOSIS — M25.521 RIGHT ELBOW PAIN: ICD-10-CM

## 2025-03-24 PROCEDURE — 1159F MED LIST DOCD IN RCRD: CPT | Mod: CPTII,S$GLB,, | Performed by: ORTHOPAEDIC SURGERY

## 2025-03-24 PROCEDURE — 99215 OFFICE O/P EST HI 40 MIN: CPT | Mod: S$GLB,,, | Performed by: ORTHOPAEDIC SURGERY

## 2025-03-24 PROCEDURE — 3074F SYST BP LT 130 MM HG: CPT | Mod: CPTII,S$GLB,, | Performed by: ORTHOPAEDIC SURGERY

## 2025-03-24 PROCEDURE — 99999 PR PBB SHADOW E&M-EST. PATIENT-LVL III: CPT | Mod: PBBFAC,,, | Performed by: ORTHOPAEDIC SURGERY

## 2025-03-24 PROCEDURE — 3008F BODY MASS INDEX DOCD: CPT | Mod: CPTII,S$GLB,, | Performed by: ORTHOPAEDIC SURGERY

## 2025-03-24 PROCEDURE — 3078F DIAST BP <80 MM HG: CPT | Mod: CPTII,S$GLB,, | Performed by: ORTHOPAEDIC SURGERY

## 2025-03-24 NOTE — PROGRESS NOTES
CC: Right elbow pain with intermittent ulnar nerve symptoms    Ammon Jones, presents today for follow up evaluation of his right elbow.  I saw him last week for recurrent medially based elbow pain.  Upon further thought he feels that his current episode of medial elbow pain relates to prior episodes in the past associated with referred pain over the ulnar aspect of his forearm into his hand.  He has had some mild intermittent numbness and tingling as well.  Recent exam has now shown some popping over the elbow consistent with intermittent ulnar nerve subluxations.  This has continued over the last week or so and at this point the player for like to discuss surgical treatment for the elbow.  Prior conservative treatment has been extensive with 2 MRI studies.    Treatment for the ulnar nerve has included taping, oral medication, again physical therapy.    Prior Hx 3/20/2025:  Ammon Jones who presents for MRI review of right elbow. Concern for UCL tear.  See prior notes for details.  Accompanied by his mother today.    Prior Hx 3/18/2025:  Ammon Jones, presents today for follow up appointment of his right elbow. He is accompanied by his mother.  Previous medially based right elbow pain with negative MR arthrogram dated 9/26/24.  The patient was treated over the off season with conservative measures which initially was successful.  The patient was able to return to play without any significant elbow issues.  Last PT session with [facility] at [facility] for elbow was 01/30/25.  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  He then felt some radiating numbness and tingling down the forearm into the ulnar aspect of the hand.  He attempted to pitch again but his velocity decreased significantly and he was pulled thereafter.  He now  comes into clinic with continued soreness and pain over the medial aspect of the elbow.  No ulnar nerve complaints at this time.    Prior Hx 2/20/2025:   Ammon Jones, presents today for follow up appointment of his right knee. Patient does not report any new incidents or injuries since their last appointment. He has attended 2 PT sessions at [facility] with [facility].  Overall much better compared to before.  Accompanied by his mother.  Was able to pitch at the end of last week.  Next pitching outing is scheduled for this coming Monday.  Medrol Dosepak was helpful.  Taking over-the-counter ibuprofen for now.    Prior Hx 9/30/2024:  Ammon Jones who presents for MRI arthrogram review of right elbow.  Evaluation for UCL pathology. Patient does not report any new incidents or injuries since their last appointment. Pain and symptoms remain unchanged since his last appointment. Here today to discuss treatment options.     Prior Hx 9/23/2024:  Ammon Jones, presents today for follow up appointment of his right knee. Patient does not report any new incidents or injuries since their last appointment. He has attended 2 PT sessions at [facility] with [facility].  Overall much better compared to before.  Accompanied by his mother.  Was able to pitch at the end of last week.  Next pitching outing is scheduled for this coming Monday.  Medrol Dosepak was helpful.  Taking over-the-counter ibuprofen for now.  19 y.o. right hand dominant Male presents as a new patient to me. 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  Accompanied by his mother today.  He reports a 2 month history of off and on medially based elbow pain.  Also localizes over the anterior distal biceps region.  Pain typically present on follow-through and ball release.  Insidious onset.  Denies any specific discrete injury moment.  Denies loss of velocity but has had some difficulty spotting his pitches.  More recently he also has had some  ulnar nerve symptoms with throwing.  His mom attributes some of his recent elbow symptoms to a change in his training regimen which has included some increased weightlifting in conjunction with baseball participation.  He denies any previous right elbow issues otherwise.  He was seen previously for his right shoulder in 2023.  There was some suggestion possibly of some thoracic outlet type complaints.  Did better with PT. he denies any elbow pain with day-to-day activity.  He has become a bit frustrated however with his ongoing symptoms.  Throws from the 3/4 slot.  Velocity around mid 80s.    PAST MEDICAL HISTORY:   No past medical history on file.    PAST SURGICAL HISTORY:  Past Surgical History:   Procedure Laterality Date    ADENOIDECTOMY Bilateral     TYMPANOSTOMY TUBE PLACEMENT Bilateral      FAMILY HISTORY:  Family History   Problem Relation Name Age of Onset    No Known Problems Mother      No Known Problems Father       MEDICATIONS:    Current Outpatient Medications:     celecoxib (CELEBREX) 200 MG capsule, Take 1 capsule (200 mg total) by mouth once daily., Disp: 30 capsule, Rfl: 1    indomethacin (INDOCIN SR) 75 mg CpSR CR capsule, Take 1 capsule (75 mg total) by mouth 2 (two) times daily., Disp: 40 capsule, Rfl: 1    methylPREDNISolone (MEDROL DOSEPACK) 4 mg tablet, use as directed, Disp: 21 each, Rfl: 0    dextromethorphan-guaiFENesin  mg (MUCINEX DM)  mg per 12 hr tablet, Take 1 tablet by mouth every 12 (twelve) hours. (Patient not taking: Reported on 3/24/2025), Disp: , Rfl:     esomeprazole (NEXIUM) 40 MG capsule, Take 1 capsule (40 mg total) by mouth before breakfast. (Patient not taking: Reported on 3/24/2025), Disp: 20 capsule, Rfl: 1    ibuprofen (ADVIL,MOTRIN) 200 MG tablet, Take 200 mg by mouth every 6 (six) hours as needed for Pain. (Patient not taking: Reported on 3/24/2025), Disp: , Rfl:     ALLERGIES:  Review of patient's allergies indicates:  No Known Allergies    REVIEW OF  "SYSTEMS:  Constitution: Negative. Negative for chills, fever and night sweats.    Hematologic/Lymphatic: Negative for bleeding problem. Does not bruise/bleed easily.   Skin: Negative for dry skin, itching and rash.   Musculoskeletal: Negative for falls. Positive for right elbow pain and muscle weakness.     All other review of symptoms were reviewed and found to be noncontributory.     PHYSICAL EXAMINATION:  Vitals:  /72   Pulse 81   Ht 6' 5" (1.956 m)   Wt 84.8 kg (186 lb 15.2 oz)   BMI 22.17 kg/m²    General: Well-developed well-nourished 20 y.o. malein no acute distress   Cardiovascular: Regular rhythm by palpation of distal pulse, normal color and temperature, no concerning varicosities on symptomatic side   Lungs: No labored breathing or wheezing appreciated   Neuro: Alert and oriented ×3   Psychiatric: well oriented to person, place and time, demonstrates normal mood and affect   Skin: No rashes, lesions or ulcers, normal temperature, turgor, and texture on uninvolved extremity    Ortho/SPM Exam  Examination of the right elbow again demonstrates essentially normal arc of motion.  He does have some K tape around the elbow to help stabilize the ulnar nerve.  Generalized discomfort with milk test and moving valgus stress test.  Localized to the ulnar nerve.  Stable to varus and valgus stress otherwise.  The patient does have an intermittently subluxating ulnar nerve.  This is tender to touch and bothers him with direct palpation.  I am able to palpate the ulnar nerve instability and intermittently able to visualize the subluxation.  Positive Tinel sign.  Intact intrinsic strength compared to the contralateral side.  Intact wrist flexor strength to resisted testing.  Non tender over the radial capitellar joint and posteromedial elbow otherwise.    IMAGING:  Repeat x-ray including AP, Lateral and radial capitallar views of the right elbow are ordered / images reviewed by me:   No significant abnormality " seen.    MRI arthrogram 09/26/24 of right elbow reviewed by me and discussed with patient. Study shows:   1. Normal appearance of the ulnar collateral ligament.  2. Mild insertional biceps tendinosis.    MRI 03/19/25 of right elbow reviewed by me and discussed with patient. Study shows:   LIGAMENTS: Ulnar collateral, lateral ulnar collateral radial collateral and annular ligaments are intact.  TENDONS: Distal biceps, brachialis, common flexor, common extensor and distal triceps tendons are intact.  BONES: No acute fractures.  No avascular necrosis.  No marrow infiltrative process.  JOINTS/CARTILAGE: No partial or full-thickness chondral defects.  No osteochondral lesions.  No joint effusion.  MUSCLES: Normal bulk and signal intensity.  No accessory muscles.  MISCELLANEOUS: Median, ulnar and radial nerves demonstrate normal contour and signal intensity.  Lacertus fibrosus is intact.  Slight prominence of the posterolateral plica.  Subcutaneous soft tissues appear within normal limits.    ASSESSMENT:      ICD-10-CM ICD-9-CM   1. Lesion of right ulnar nerve  G56.21 354.2   2. Right elbow pain  M25.521 719.42     Subluxating ulnar nerve    PLAN:     I had a long discussion with the patient and his mother today.  At this point he does not feel that he can return to throwing and has concerns regarding the ongoing irritability and pain he experiences of the medial aspect of his elbow.  The original plan was allow this to calm down for a few weeks in to see if we can get him back to throwing but it is looking like this will take probably another 6 weeks to get the nerve to calm down to allow him to get back to throwing and certainly given his prior history last Fall, I think there is some concern that this becomes a recurrent issue moving forward.  He has had extensive prior conservative treatment.  From that standpoint I do think it is reasonable to consider surgery.  I do think there was a component of this possibly last  fall without clear ulnar subluxation events.  Treatment options discussed.  We did discuss the details of an open ulnar nerve decompression with anterior subcutaneous transposition.  The expected postop rehab and recovery course was reviewed.  He does wish to proceed.  This is primarily an ulnar nerve instability issue.  I do not think we need neurodiagnostics to confirm.  He does wish to proceed as soon as possible.  Prior MRI studies have confirmed an intact UCL but I think it would be wise in this case for the patient to see my primary care sports medicine colleague to do a dynamic ultrasound to confirm competency of the UCL in this case prior to surgery on the ulnar nerve.  They are in agreement.  We will set that up.    Plan is right elbow open ulnar nerve decompression with anterior subcutaneous transposition    Informed Consent:    The details of the surgical procedure were explained, including the location of probable incisions and a description of possible hardware and/or grafts to be used. Alternatives to both operative and non-operative options with associated risks and benefits were discussed. The patient understands the likely convalescence after surgery and, in particular, the expected postop rehab and recovery course. The outlined risks and potential complications of the proposed procedure include but are not limited to: infection, poor wound healing, scarring, deformity, stiffness, swelling, continued or recurrent pain, instability, hardware or prosthetic failure if implanted, symptomatic hardware requiring removal, dislocation, weakness, neurovascular injury, numbness, chronic regional pain disorder, tissue nonhealing/irreparability/retear, subsequent contralateral limb injury or pathology, chondral injury, arthritis, fracture, blood clot formation, inability to return to previous level of activity, anesthetic or regional block complication up to death, need for additional procedure as indicated  intraoperatively, and potential need for further surgery.    The patient was also informed and understands that the risks of surgery are greater for patients with a current condition or history of heart disease, obesity, clotting disorders, recurrent infections, steroid use, current or past smoking, and factors such as sedentary lifestyle and noncompliance with medications, therapy or follow-up. The degree of the increased risk is hard to estimate with any degree of precision. If applicable, smoking cessation was discussed.     All questions were answered. The patient has verbalized understanding of these issues and wishes to proceed with the surgery as discussed.      Procedures

## 2025-03-24 NOTE — TELEPHONE ENCOUNTER
----- Message from STEFANI Waldron MD sent at 3/24/2025 12:08 PM CDT -----  Ammon is coming back into clinic today at 3:45 pm. to discuss possible surgery for his right elbow.  No need to call him.

## 2025-03-25 ENCOUNTER — PATIENT MESSAGE (OUTPATIENT)
Dept: PREADMISSION TESTING | Facility: HOSPITAL | Age: 20
End: 2025-03-25
Payer: COMMERCIAL

## 2025-03-25 ENCOUNTER — OFFICE VISIT (OUTPATIENT)
Dept: SPORTS MEDICINE | Facility: CLINIC | Age: 20
End: 2025-03-25
Payer: COMMERCIAL

## 2025-03-25 VITALS
HEIGHT: 77 IN | WEIGHT: 183.19 LBS | SYSTOLIC BLOOD PRESSURE: 112 MMHG | DIASTOLIC BLOOD PRESSURE: 72 MMHG | BODY MASS INDEX: 21.63 KG/M2 | HEART RATE: 79 BPM

## 2025-03-25 DIAGNOSIS — M25.521 RIGHT ELBOW PAIN: Primary | ICD-10-CM

## 2025-03-25 DIAGNOSIS — G56.21 LESION OF RIGHT ULNAR NERVE: ICD-10-CM

## 2025-03-25 PROCEDURE — 3078F DIAST BP <80 MM HG: CPT | Mod: CPTII,S$GLB,, | Performed by: ORTHOPAEDIC SURGERY

## 2025-03-25 PROCEDURE — 76882 US LMTD JT/FCL EVL NVASC XTR: CPT | Mod: S$GLB,,, | Performed by: ORTHOPAEDIC SURGERY

## 2025-03-25 PROCEDURE — 99214 OFFICE O/P EST MOD 30 MIN: CPT | Mod: 25,S$GLB,, | Performed by: ORTHOPAEDIC SURGERY

## 2025-03-25 PROCEDURE — 99999 PR PBB SHADOW E&M-EST. PATIENT-LVL III: CPT | Mod: PBBFAC,,, | Performed by: ORTHOPAEDIC SURGERY

## 2025-03-25 PROCEDURE — 3008F BODY MASS INDEX DOCD: CPT | Mod: CPTII,S$GLB,, | Performed by: ORTHOPAEDIC SURGERY

## 2025-03-25 PROCEDURE — 1159F MED LIST DOCD IN RCRD: CPT | Mod: CPTII,S$GLB,, | Performed by: ORTHOPAEDIC SURGERY

## 2025-03-25 PROCEDURE — 1160F RVW MEDS BY RX/DR IN RCRD: CPT | Mod: CPTII,S$GLB,, | Performed by: ORTHOPAEDIC SURGERY

## 2025-03-25 PROCEDURE — 3074F SYST BP LT 130 MM HG: CPT | Mod: CPTII,S$GLB,, | Performed by: ORTHOPAEDIC SURGERY

## 2025-03-25 NOTE — PROGRESS NOTES
DIAGNOSTIC ULTRASOUND FOCUSED: performed on 03/25/2025    1. Diagnostic Extremity - [facility]-Sports Ultrasound was recommended due to right medial elbow pain.  2. Diagnostic Extremity - [facility]-Sports Ultrasound Performed: Sarm[facility] Prince Extremity Study: right medial elbow.    TECHNIQUE: Real time ultrasound examination of the right medial elbow was performed with SonCollective IP 2, GE - 6-15 MHz (L) probe(s).     FINDINGS: The images are of adequate diagnostic quality with identification of all echogenic structures made except for the vascular structures unless otherwise noted. There is no sonographic evidence of periosteal abnormalities, soft tissue edema, musculotendinous irregularities.     The UCL is intact. With dynamic stress testing, the elbow joint is stable. There is minimal, but expected, widening of the medial elbow just from rest (0.28cm) to stress (0.3cm). There is physiologic thickening of the UCL midsubstance, commonly seen in throwers.    The ulnar nerve is intact and there is hyperechoic change about the nerve at the cubital tunnel as well as fascicular enlargement, consistent with neuropathy. The ulnar nerve is seen subluxing out of the cubital tunnel with elbow flexion and extension.    The rest of the sonographic examination was unremarkable.    Ultrasound images were directly reviewed with the patient and then a treatment plan was discussed.    IMPRESSION: UCL is intact with associated stability of the elbow joint. The ulnar nerve demonstrates signs of neuropathy as well as subluxation.

## 2025-03-25 NOTE — ANESTHESIA PAT ROS NOTE
03/25/2025  Ammon Jones is a 20 y.o., male.      Pre-op Assessment    I have reviewed the Patient Summary Reports.       I have reviewed the Medications.     Review of Systems  Anesthesia Hx:  No problems with previous Anesthesia   History of prior surgery of interest to airway management or planning:  Previous anesthesia: MAC       1/13/2022  EGD with biopsy with MAC.     Denies Personal Hx of Anesthesia complications.                    Social:  Non-Smoker, No Alcohol Use       Hematology/Oncology:  Hematology Normal   Oncology Normal                                   EENT/Dental:   H/O Tympanostomy Tube Placement,  Adenoidectomy          Cardiovascular:  Cardiovascular Normal Exercise tolerance: good        Denies Dysrhythmias.         Denies LAZO.    Patient not on beta blockers                          Pulmonary:  Pulmonary Normal    Denies Asthma.   Denies Shortness of breath.                  Renal/:  Renal/ Normal                 Hepatic/GI:      Denies GERD.   H/O Eosinophilic esophagitis  Not Taking GLP-1 Agonists            Musculoskeletal:     Lesion of right ulnar nerve,            Neurological:  Neurology Normal      Denies Seizures.                                Endocrine:  Endocrine Normal Denies Diabetes. Denies Hypothyroidism.          Psych:  Psychiatric Normal                   No past medical history on file.  Past Surgical History:   Procedure Laterality Date    ADENOIDECTOMY Bilateral     TYMPANOSTOMY TUBE PLACEMENT Bilateral        Anesthesia Assessment: Preoperative EQUATION    Planned Procedure: Procedure(s) (LRB):  TRANSPOSITION, NERVE, ULNAR (Right)  Requested Anesthesia Type:General  Surgeon: STEFANI Waldron MD  Service: Orthopedics  Known or anticipated Date of Surgery:3/31/2025    Surgeon notes: reviewed    Electronic QUestionnaire Assessment completed via nurse  interview with patient.        Triage considerations:     The patient has no apparent active cardiac condition (No unstable coronary Syndrome such as severe unstable angina or recent [<1 month] myocardial infarction, decompensated CHF, severe valvular   disease or significant arrhythmia)    Previous anesthesia records:MAC and No problems    Last PCP note: > 1 year ago , outside [facility]   Subspecialty notes: n/a    Other important co-morbidities:  No co-morbidities noted.        Tests already available:  No recent tests.             Instructions given. (See in Nurse's note) Preop medication instructions sent via portal message.     Optimization:  Anesthesia Preop Clinic Assessment Not Indicated    Medical Opinion Indicated: No       Sub-specialist consult indicated: No      Plan: Consultation: Medical clearance is not requested.        Navigation:  Straight Line to surgery.               No tests, anesthesia preop clinic visit, or consult required.                        Patient is OK to proceed with surgery at [facility].       Ht: 6'5  Wt: 84.8 kg (186 lb)  BMI: 22.17

## 2025-03-25 NOTE — PROGRESS NOTES
"CC: right elbow pain    20 y.o. Male presents today for evaluation of his right elbow pain. 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 Waldron. Last fall, he pitched too much and his elbow began to hurt. The pain progressed with radiation to digits 4-5. He reports  No EMG. Tried PT. Ordered nighttime elbow splint.     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 No pertinent past medical history.    PAST SURGICAL HISTORY:   Past Surgical History:   Procedure Laterality Date    ADENOIDECTOMY Bilateral     TYMPANOSTOMY TUBE PLACEMENT Bilateral        FAMILY HISTORY:   Family History   Problem Relation Name Age of Onset    No Known Problems Mother      No Known Problems Father         SOCIAL HISTORY:   Social History[1]    MEDICATIONS:   Current Medications[2]    ALLERGIES:   Review of patient's allergies indicates:  No Known Allergies     PHYSICAL EXAMINATION:  /72 (Patient Position: Sitting)   Pulse 79   Ht 6' 5" (1.956 m)   Wt 83.1 kg (183 lb 3.2 oz)   BMI 21.72 kg/m²   Vitals signs and nursing note have been reviewed.  General: In no acute distress, well developed, well nourished, no diaphoresis  Eyes: EOM full and smooth, no eye redness or discharge  HENT: normocephalic and atraumatic, neck supple, trachea midline, no nasal discharge, no external ear redness or discharge  Cardiovascular: no LE edema  Lungs: respirations non-labored, no conversational dyspnea   Abd: non-distended, no rigidity  MSK: no amputation or deformity, no swelling of extremities  Neuro: AAOx3, " CN2-12 grossly intact  Skin: No rashes, warm and dry  Psychiatric: cooperative, pleasant, mood and affect appropriate for age    Elbow: RIGHT   The affected elbow is compared to the contralateral elbow.    Observation:    There is no edema, erythema, or ecchymosis.  There is no obvious muscle atrophy, hypertonicity, or hypotonicity of arm muscles.  There is no abnormal carrying angle or gunstock deformity noted.    ROM:  Active flexion to 150° on left and 150° on right.    Active extension to 0° on left and 0° on right without hyperextension.   Active pronation to 80° on left and 80° on right.    Active supination to 80° on left and 80° on right.    Active radial deviation to 20° on left and 20° on right.    Active ulnar deviation to 30° on left and 30° on right.    Tenderness To Palpation:  No tenderness at the medial epicondyle or lateral epicondyle.  No tenderness at the olecranon.  No tenderness at the distal humerus or proximal radius/ulna.  No tenderness at the radial head.  No tenderness over the ulnar and radial collateral ligaments.  No tenderness over the posterior interosseous nerve or distal biceps tendon.    Strength Testing: (*pain)  Deltoid - 5/5 on left and 5/5 on right  Biceps - 5/5 on left and 5/5 on right  Triceps - 5/5 on left and 5/5 on right  Wrist extension - 5/5 on left and 5/5 on right  Wrist flexion - 5/5 on left and 5/5 on right   - 5/5 on left and 5/5 on right  Finger extension - 5/5 on left and 5/5 on right  Finger abduction - 5/5 on left and 5/5 on right    Special Tests:  No laxity of the MCL at 0 and 30 degrees.    Milking maneuver - negative  No laxity of the LCL at 0 and 30 degrees.  No laxity with posterolateral and posteromedial rotary testing.    3rd digit extension resistance test - negative  Resisted supination - negative  Resisted pronation - negative  Resisted wrist extension - negative  Resisted wrist flexion - negative    Neurovascular Exam:  2+ radial pulses  BL.  Sensation to light touch intact in the distal median, radial, and ulnar nerve distributions bilaterally.  Positive Tinnels at cubital tunnel.    IMAGING:    DIAGNOSTIC ULTRASOUND FOCUSED: performed on 03/25/2025  1. Diagnostic Extremity - MSK-Sports Ultrasound was recommended due to right medial elbow pain.  2. Diagnostic Extremity - MSK-Sports Ultrasound Performed: Sarmad Prince Extremity Study: right medial elbow.  TECHNIQUE: Real time ultrasound examination of the right medial elbow was performed with SonSMS Assist 2, GE - 6-15 MHz (L) probe(s).   FINDINGS: The images are of adequate diagnostic quality with identification of all echogenic structures made except for the vascular structures unless otherwise noted. There is no sonographic evidence of periosteal abnormalities, soft tissue edema, musculotendinous irregularities.   The UCL is intact. With dynamic stress testing, the elbow joint is stable. There is minimal, but expected, widening of the medial elbow just from rest (0.28cm) to stress (0.3cm). There is physiologic thickening of the UCL midsubstance, commonly seen in throwers.  The ulnar nerve is intact and there is hyperechoic change about the nerve at the cubital tunnel as well as fascicular enlargement, consistent with neuropathy. The ulnar nerve is seen subluxing out of the cubital tunnel with elbow flexion and extension.  The rest of the sonographic examination was unremarkable.  Ultrasound images were directly reviewed with the patient and then a treatment plan was discussed.  IMPRESSION: UCL is intact with associated stability of the elbow joint. The ulnar nerve demonstrates signs of neuropathy as well as subluxation.      ASSESSMENT:      ICD-10-CM ICD-9-CM   1. Right elbow pain  M25.521 719.42   2. Lesion of right ulnar nerve  G56.21 354.2         PLAN:  1-2. Right elbow pain/ulnar nerve subluxation -     - Ammon is a [employer] (pitcher) here today for diagnostic ultrasound of  the right medial elbow referred by Dr. Waldron.     - Diagnostic ultrasound of the right medial elbow performed today. See above for further detail.    - Findings discussed with Dr. Waldron.       Future planning includes - next steps per Dr. Waldron and his staff     All questions were answered to the best of my ability and all concerns were addressed at this time.    Follow up with Dr. Waldron.       This note is dictated using the M*Modal Fluency Direct word recognition program. There are word recognition mistakes that are occasionally missed on review.                 [1]   Social History  Socioeconomic History    Marital status: Single   Tobacco Use    Smoking status: Never    Smokeless tobacco: Never   Substance and Sexual Activity    Alcohol use: No     Alcohol/week: 0.0 standard drinks of alcohol    Drug use: No     Social Drivers of Health     Financial Resource Strain: Patient Declined (9/22/2024)    Overall Financial Resource Strain (CARDIA)     Difficulty of Paying Living Expenses: Patient declined   Food Insecurity: Patient Declined (9/22/2024)    Hunger Vital Sign     Worried About Running Out of Food in the Last Year: Patient declined     Ran Out of Food in the Last Year: Patient declined   Transportation Needs: No Transportation Needs (7/29/2021)    Received from [facility] - Transportation     Lack of Transportation (Medical): No     Lack of Transportation (Non-Medical): No   Physical Activity: Sufficiently Active (9/22/2024)    Exercise Vital Sign     Days of Exercise per Week: 5 days     Minutes of Exercise per Session: 120 min   Stress: No Stress Concern Present (9/22/2024)    [employer] - Occupational Stress Questionnaire     Feeling of Stress : Not at all   Housing Stability: Unknown (9/22/2024)    Housing Stability Vital Sign     Unable to Pay for Housing in the Last Year: Patient declined   [2]   Current Outpatient Medications:     celecoxib (CELEBREX)  200 MG capsule, Take 1 capsule (200 mg total) by mouth once daily., Disp: 30 capsule, Rfl: 1    dextromethorphan-guaiFENesin  mg (MUCINEX DM)  mg per 12 hr tablet, Take 1 tablet by mouth every 12 (twelve) hours. (Patient not taking: Reported on 3/25/2025), Disp: , Rfl:     esomeprazole (NEXIUM) 40 MG capsule, Take 1 capsule (40 mg total) by mouth before breakfast. (Patient not taking: Reported on 3/25/2025), Disp: 20 capsule, Rfl: 1    ibuprofen (ADVIL,MOTRIN) 200 MG tablet, Take 200 mg by mouth every 6 (six) hours as needed for Pain. (Patient not taking: Reported on 3/25/2025), Disp: , Rfl:     indomethacin (INDOCIN SR) 75 mg CpSR CR capsule, Take 1 capsule (75 mg total) by mouth 2 (two) times daily. (Patient not taking: Reported on 3/25/2025), Disp: 40 capsule, Rfl: 1    methylPREDNISolone (MEDROL DOSEPACK) 4 mg tablet, use as directed (Patient not taking: Reported on 3/25/2025), Disp: 21 each, Rfl: 0

## 2025-03-26 ENCOUNTER — ATHLETIC TRAINING SESSION (OUTPATIENT)
Dept: SPORTS MEDICINE | Facility: CLINIC | Age: 20
End: 2025-03-26
Payer: COMMERCIAL

## 2025-03-26 DIAGNOSIS — Z00.00 HEALTHCARE MAINTENANCE: Primary | ICD-10-CM

## 2025-03-26 DIAGNOSIS — G56.21 LESION OF RIGHT ULNAR NERVE: Primary | ICD-10-CM

## 2025-03-26 DIAGNOSIS — M25.521 RIGHT ELBOW PAIN: Primary | ICD-10-CM

## 2025-03-26 NOTE — PROGRESS NOTES
Reason for Encounter Follow-Up    Subjective:       Chief Complaint: Ammon Jones is a 20 y.o. male student at [employer]) who had concerns including Health Maintenance of the Right Elbow.    Ammon is a  at [employer]. He started having elbow pain towards the end of the summer and it worsened in the fall season. He has been going to formal PT and doing rehab in the [facility].     Handedness: right-handed  Sport played: baseball      Level: college      Position:pitcher      Pain    Injury        ROS              Objective:       General: Ammon is well-developed, well-nourished, appears stated age, in no acute distress, alert and oriented to time, place and person.     AT Session          Assessment:     Status: F - Full Participation    Date Seen:  03/09/2025-03/15/2025    Date of Injury:  NA    Date Out:  NA    Date Cleared:  NA        Treatment/Rehab/Maintenance:   Pre-Throw  Date: 03/14/2025    I, Y, T, W with J bands 1 x 5   Throwing motion with J bands 1 x 55   Reverse throws 1 x 5   Pivot pick off holds 1 x 5   Roll in holds 1 x 5   Walking wind up 1 x 5       Ammon completed therapeutic exercises to develop strength:    Date: 03/11/2025    Exercises  Sets x Reps  Weight   Band snow angles  4 x 12  Red J bands    Behind the back lift offs  4 x 12  Green theraband    Prone Plyo ball I, Y, T  3 x 8  3.3 lbs    Wrist flexion/ extension  4 x 12  4 lbs    Finger flexion/extension  4 x 12  Green web   Wrist stretches on table  3 x 30s                                      Plan:       1. Daily arm care work to keep arm healthy during season.   2. Physician Referral: no  3. ED Referral:no  4. Parent/Guardian Notified: No  5. All questions were answered, ath. will contact me for questions or concerns in  the interim.  6.         Eligible to use School Insurance: Yes

## 2025-03-26 NOTE — PROGRESS NOTES
Reason for Encounter Follow-Up    Subjective:       Chief Complaint: Ammon Jones is a 20 y.o. male student at [employer] who had concerns including Injury of the Right Elbow.    Ammon Jones is a  for [employer]. During his outing on 3/14/2025 he felt a pop in his elbow and was removed from the game. He is doing rehab to strengthen the structures around the elbow and trying to avoid painful movement.     Handedness: right-handed  Sport played: baseball      Level: college      Position:pitcher      Injury        ROS              Objective:       General: Ammon is well-developed, well-nourished, appears stated age, in no acute distress, alert and oriented to time, place and person.     AT Session          Assessment:     Status: O - Out    Date Seen:  03/16/2025-03/22/2025    Date of Injury:  03/14/2025    Date Out:  03/14/2025    Date Cleared:  NA        Treatment/Rehab/Maintenance:   Ammon completed therapeutic exercises to develop strength:    Date: 03/22/2025    Exercises  Sets x Reps  Weight   Finger flexion/extension  3 x 10  Red finger web    Wrist flexion/ extension  3 x 10  3 lbs    Pronation/supination  3 x 10     Radial/ ulnar deviation  3 x 10 4 lbs                                           Ammon completed therapeutic exercises to develop strength:    Date: 03/21/2025    Exercises  Sets x Reps  Weight   Finger flexion/extension  3 x 10  Red finger web    Wrist flexion/ extension  3 x 10  3 lbs    Pronation/supination  3 x 10     Radial/ ulnar deviation  3 x 10 4 lbs                                             Plan:       1. Continue rehab up until his follow up.   2. Physician Referral: yes  3. ED Referral:no  4. Parent/Guardian Notified: Yes Parent Name: Merritt Jones  Date 03/22/2025  Time: 3:00  Method of Communication: Face to face  5. All questions were answered, ath. will contact me for questions or concerns in  the interim.  6.          Eligible to use School Insurance: Yes

## 2025-03-26 NOTE — PROGRESS NOTES
Reason for Encounter Follow-Up    Subjective:       Chief Complaint: Ammon Jones is a 20 y.o. male student at [employer]) who had concerns including Injury of the Right Elbow.    Ammon Jones is a  for [employer]. During his outing on 3/14/2025 he felt a pop in his elbow and was removed from the game. He is doing rehab to strengthen the structures around the elbow and trying to avoid painful movement.     Handedness: right-handed  Sport played: baseball      Level: college      Position:pitcher      Injury        ROS              Objective:       General: Ammon is well-developed, well-nourished, appears stated age, in no acute distress, alert and oriented to time, place and person.     AT Session          Assessment:     Status: O - Out    Date Seen:  03/23/2025-03/29/2025    Date of Injury:  03/14/2025    Date Out:  03/14/2025    Date Cleared:  NA        Treatment/Rehab/Maintenance:   Ammon completed therapeutic exercises to develop strength:    Date: 03/28/2025    Exercises  Sets x Reps  Weight   Finger flexion/extension  3 x 10  Red finger web    Wrist flexion/ extension  3 x 10  3 lbs    Pronation/supination  3 x 10     Radial/ ulnar deviation  3 x 10 4 lbs                                         Ammon completed therapeutic exercises to develop strength:    Date: 03/27/2025    Exercises  Sets x Reps  Weight   Finger flexion/extension  3 x 10  Red finger web    Wrist flexion/ extension  3 x 10  3 lbs    Pronation/supination  3 x 10     Radial/ ulnar deviation  3 x 10 4 lbs                                           Ammon completed therapeutic exercises to develop strength:    Date: 03/26/2025    Exercises  Sets x Reps  Weight   Finger flexion/extension  3 x 10  Red finger web    Wrist flexion/ extension  3 x 10  3 lbs    Pronation/supination  3 x 10     Radial/ ulnar deviation  3 x 10 4 lbs                                         Ammon completed therapeutic  exercises to develop strength:    Date: 03/25/2025    Exercises  Sets x Reps  Weight   Finger flexion/extension  3 x 10  Red finger web    Wrist flexion/ extension  3 x 10  3 lbs    Pronation/supination  3 x 10     Radial/ ulnar deviation  3 x 10 4 lbs                                           Ammon completed therapeutic exercises to develop strength:    Date: 03/23/2025    Exercises  Sets x Reps  Weight   Finger flexion/extension  3 x 10  Red finger web    Wrist flexion/ extension  3 x 10  3 lbs    Pronation/supination  3 x 10     Radial/ ulnar deviation  3 x 10 4 lbs                                             Plan:       1. Continue rehab up until his follow up.   2. Physician Referral: yes  3. ED Referral:no  4. Parent/Guardian Notified: Yes Parent Name: Merritt Jones  Date 03/22/2025  Time: 3:00  Method of Communication: Face to face  5. All questions were answered, ath. will contact me for questions or concerns in  the interim.  6.         Eligible to use School Insurance: Yes

## 2025-03-27 ENCOUNTER — ANESTHESIA EVENT (OUTPATIENT)
Dept: SURGERY | Facility: HOSPITAL | Age: 20
End: 2025-03-27
Payer: COMMERCIAL

## 2025-03-27 ENCOUNTER — OFFICE VISIT (OUTPATIENT)
Dept: SPORTS MEDICINE | Facility: CLINIC | Age: 20
End: 2025-03-27
Payer: COMMERCIAL

## 2025-03-27 DIAGNOSIS — G56.21 LESION OF RIGHT ULNAR NERVE: Primary | ICD-10-CM

## 2025-03-27 PROCEDURE — 99999 PR PBB SHADOW E&M-EST. PATIENT-LVL III: CPT | Mod: PBBFAC,,, | Performed by: PHYSICIAN ASSISTANT

## 2025-03-27 PROCEDURE — 99499 UNLISTED E&M SERVICE: CPT | Mod: S$GLB,,, | Performed by: PHYSICIAN ASSISTANT

## 2025-03-27 RX ORDER — OXYCODONE HYDROCHLORIDE 5 MG/1
5-10 TABLET ORAL
Qty: 28 TABLET | Refills: 0 | Status: SHIPPED | OUTPATIENT
Start: 2025-03-27

## 2025-03-27 RX ORDER — ASPIRIN 81 MG/1
81 TABLET ORAL 2 TIMES DAILY
Qty: 28 TABLET | Refills: 0 | Status: SHIPPED | OUTPATIENT
Start: 2025-03-27 | End: 2025-04-10

## 2025-03-27 RX ORDER — SODIUM CHLORIDE 9 MG/ML
INJECTION, SOLUTION INTRAVENOUS CONTINUOUS
OUTPATIENT
Start: 2025-03-27

## 2025-03-27 RX ORDER — ONDANSETRON 4 MG/1
4 TABLET, ORALLY DISINTEGRATING ORAL EVERY 8 HOURS PRN
Qty: 30 TABLET | Refills: 0 | Status: SHIPPED | OUTPATIENT
Start: 2025-03-27

## 2025-03-27 NOTE — H&P
Ammon Jones  is here for a completion of his perioperative paperwork. he  Is scheduled to undergo right elbow open ulnar nerve decompression with anterior subcutaneous transposition on 3/31/2025.  He is a healthy individual and does not need clearance for this procedure.     Risks, indications and benefits of the surgical procedure were discussed with the patient. All questions with regard to surgery, rehab, expected return to functional activities, activities of daily living and recreational endeavors were answered to his satisfaction.    Discussed COVID-19 with the patient, they are aware of our current policies and procedures, were given the option of delaying surgery, and they elect to proceed.    Patient was informed and understands the risks of surgery are greater for patients with a current condition or hx of heart disease, obesity, clotting disorders, recurrent infections, steroid use, current or past smoking, and factors such as sedentary lifestyle and noncompliance with medications, therapy or f/u. The degree of the increased risk is hard to estimate w/ any degree of precision.    Once no other questions were asked, a brief history and physical exam was then performed.    PAST MEDICAL HISTORY: No past medical history on file.  PAST SURGICAL HISTORY:   Past Surgical History:   Procedure Laterality Date    ADENOIDECTOMY Bilateral     TYMPANOSTOMY TUBE PLACEMENT Bilateral      FAMILY HISTORY:   Family History   Problem Relation Name Age of Onset    No Known Problems Mother      No Known Problems Father       SOCIAL HISTORY: Social History[1]    MEDICATIONS: Current Medications[2]  ALLERGIES: Review of patient's allergies indicates:  No Known Allergies    Review of Systems   Constitution: Negative. Negative for chills, fever and night sweats.   HENT: Negative for congestion and headaches.    Eyes: Negative for blurred vision, left vision loss and right vision loss.   Cardiovascular: Negative for chest  pain and syncope.   Respiratory: Negative for cough and shortness of breath.    Endocrine: Negative for polydipsia, polyphagia and polyuria.   Hematologic/Lymphatic: Negative for bleeding problem. Does not bruise/bleed easily.   Skin: Negative for dry skin, itching and rash.   Musculoskeletal: Negative for falls and muscle weakness.   Gastrointestinal: Negative for abdominal pain and bowel incontinence.   Genitourinary: Negative for bladder incontinence and nocturia.   Neurological: Negative for disturbances in coordination, loss of balance and seizures.   Psychiatric/Behavioral: Negative for depression. The patient does not have insomnia.    Allergic/Immunologic: Negative for hives and persistent infections.     PHYSICAL EXAM:  GEN: A&Ox3, WD WN NAD  HEENT: WNL  CHEST: CTAB, no W/R/R  HEART: RRR, no M/R/G   ABD: Soft, NT ND, BS x4 QUADS  MS: Refer to previous note for detailed MS exam  NEURO: CN II-XII intact       The surgical consent was then reviewed with the patient, who agreed with all the contents of the consent form and it was signed.     PHYSICAL THERAPY:  He was also instructed regarding physical therapy and will begin on POD#1-3. He is doing physical therapy at [facility].    POST OP CARE: Instructions were reviewed including care of the wound and dressing after surgery and when he can shower.     PAIN MANAGEMENT: Ammon Jones was instructed regarding the Polar ice unit that will be in place after surgery and his postoperative pain medications.     MEDICATION:  Roxicodone 5 mg 1-2 q 4 hours PRN for pain  Zofran 4 mg q 8 hours PRN for nausea and vomiting.  Aspirin 81mg BID x 2 weeks for DVT prophylaxis starting on the evening after surgery.      Post op meds to be delivered bedside prior to discharge. Deliver to family if patient is in surgery at 5pm.     Patient was instructed to purchase and take Colace to counter possible GI side effects of taking opiates.     DVT  prophylaxis was discussed with the patient today including risk factors for developing DVTs and history of DVTs. The patient was asked if any specific recommendations were given from the doctor/s that did pre-operative surgical clearance.      If the patient was previously taking 81mg baby aspirin, they were told to not take additional baby aspirin, using the above stated aspirin and to restart the 81mg aspirin daily after completion of the aspirin dose.      Patient was also told to buy over the counter Prilosec medication and take it once daily for GI protection as long as they are taking NSAIDs or Aspirin.     The patient was told that narcotic pain medications may make them drowsy and instructions were given to not sign legal documents, drive or operate heavy machinery, cars, or equipment while under the influence of narcotic medications.     As there were no other questions to be asked, he was given my business card along with Dr. Waldron's business card if he has any questions or concerns prior to surgery or in the postop period.          [1]   Social History  Socioeconomic History    Marital status: Single   Tobacco Use    Smoking status: Never    Smokeless tobacco: Never   Substance and Sexual Activity    Alcohol use: No     Alcohol/week: 0.0 standard drinks of alcohol    Drug use: No     Social Drivers of Health     Financial Resource Strain: Patient Declined (9/22/2024)    Overall Financial Resource Strain (CARDIA)     Difficulty of Paying Living Expenses: Patient declined   Food Insecurity: Patient Declined (9/22/2024)    Hunger Vital Sign     Worried About Running Out of Food in the Last Year: Patient declined     Ran Out of Food in the Last Year: Patient declined   Transportation Needs: No Transportation Needs (7/29/2021)    Received from [facility] - Transportation     Lack of Transportation (Medical): No     Lack of Transportation (Non-Medical): No   Physical Activity: Sufficiently Active  (9/22/2024)    Exercise Vital Sign     Days of Exercise per Week: 5 days     Minutes of Exercise per Session: 120 min   Stress: No Stress Concern Present (9/22/2024)    [place] [employer] - Occupational Stress Questionnaire     Feeling of Stress : Not at all   Housing Stability: Unknown (9/22/2024)    Housing Stability Vital Sign     Unable to Pay for Housing in the Last Year: Patient declined   [2]   Current Outpatient Medications:     celecoxib (CELEBREX) 200 MG capsule, Take 1 capsule (200 mg total) by mouth once daily., Disp: 30 capsule, Rfl: 1    dextromethorphan-guaiFENesin  mg (MUCINEX DM)  mg per 12 hr tablet, Take 1 tablet by mouth every 12 (twelve) hours. (Patient not taking: Reported on 3/25/2025), Disp: , Rfl:     esomeprazole (NEXIUM) 40 MG capsule, Take 1 capsule (40 mg total) by mouth before breakfast. (Patient not taking: Reported on 3/25/2025), Disp: 20 capsule, Rfl: 1    ibuprofen (ADVIL,MOTRIN) 200 MG tablet, Take 200 mg by mouth every 6 (six) hours as needed for Pain. (Patient not taking: Reported on 3/25/2025), Disp: , Rfl:     indomethacin (INDOCIN SR) 75 mg CpSR CR capsule, Take 1 capsule (75 mg total) by mouth 2 (two) times daily. (Patient not taking: Reported on 3/25/2025), Disp: 40 capsule, Rfl: 1    methylPREDNISolone (MEDROL DOSEPACK) 4 mg tablet, use as directed (Patient not taking: Reported on 3/25/2025), Disp: 21 each, Rfl: 0

## 2025-03-27 NOTE — H&P (VIEW-ONLY)
Ammon Jones  is here for a completion of his perioperative paperwork. he  Is scheduled to undergo right elbow open ulnar nerve decompression with anterior subcutaneous transposition on 3/31/2025.  He is a healthy individual and does not need clearance for this procedure.     Risks, indications and benefits of the surgical procedure were discussed with the patient. All questions with regard to surgery, rehab, expected return to functional activities, activities of daily living and recreational endeavors were answered to his satisfaction.    Discussed COVID-19 with the patient, they are aware of our current policies and procedures, were given the option of delaying surgery, and they elect to proceed.    Patient was informed and understands the risks of surgery are greater for patients with a current condition or hx of heart disease, obesity, clotting disorders, recurrent infections, steroid use, current or past smoking, and factors such as sedentary lifestyle and noncompliance with medications, therapy or f/u. The degree of the increased risk is hard to estimate w/ any degree of precision.    Once no other questions were asked, a brief history and physical exam was then performed.    PAST MEDICAL HISTORY: No past medical history on file.  PAST SURGICAL HISTORY:   Past Surgical History:   Procedure Laterality Date    ADENOIDECTOMY Bilateral     TYMPANOSTOMY TUBE PLACEMENT Bilateral      FAMILY HISTORY:   Family History   Problem Relation Name Age of Onset    No Known Problems Mother      No Known Problems Father       SOCIAL HISTORY: Social History[1]    MEDICATIONS: Current Medications[2]  ALLERGIES: Review of patient's allergies indicates:  No Known Allergies    Review of Systems   Constitution: Negative. Negative for chills, fever and night sweats.   HENT: Negative for congestion and headaches.    Eyes: Negative for blurred vision, left vision loss and right vision loss.   Cardiovascular: Negative for chest  pain and syncope.   Respiratory: Negative for cough and shortness of breath.    Endocrine: Negative for polydipsia, polyphagia and polyuria.   Hematologic/Lymphatic: Negative for bleeding problem. Does not bruise/bleed easily.   Skin: Negative for dry skin, itching and rash.   Musculoskeletal: Negative for falls and muscle weakness.   Gastrointestinal: Negative for abdominal pain and bowel incontinence.   Genitourinary: Negative for bladder incontinence and nocturia.   Neurological: Negative for disturbances in coordination, loss of balance and seizures.   Psychiatric/Behavioral: Negative for depression. The patient does not have insomnia.    Allergic/Immunologic: Negative for hives and persistent infections.     PHYSICAL EXAM:  GEN: A&Ox3, WD WN NAD  HEENT: WNL  CHEST: CTAB, no W/R/R  HEART: RRR, no M/R/G   ABD: Soft, NT ND, BS x4 QUADS  MS: Refer to previous note for detailed MS exam  NEURO: CN II-XII intact       The surgical consent was then reviewed with the patient, who agreed with all the contents of the consent form and it was signed.     PHYSICAL THERAPY:  He was also instructed regarding physical therapy and will begin on POD#1-3. He is doing physical therapy at [facility].    POST OP CARE: Instructions were reviewed including care of the wound and dressing after surgery and when he can shower.     PAIN MANAGEMENT: Ammon Jones was instructed regarding the Polar ice unit that will be in place after surgery and his postoperative pain medications.     MEDICATION:  Roxicodone 5 mg 1-2 q 4 hours PRN for pain  Zofran 4 mg q 8 hours PRN for nausea and vomiting.  Aspirin 81mg BID x 2 weeks for DVT prophylaxis starting on the evening after surgery.      Post op meds to be delivered bedside prior to discharge. Deliver to family if patient is in surgery at 5pm.     Patient was instructed to purchase and take Colace to counter possible GI side effects of taking opiates.     DVT  prophylaxis was discussed with the patient today including risk factors for developing DVTs and history of DVTs. The patient was asked if any specific recommendations were given from the doctor/s that did pre-operative surgical clearance.      If the patient was previously taking 81mg baby aspirin, they were told to not take additional baby aspirin, using the above stated aspirin and to restart the 81mg aspirin daily after completion of the aspirin dose.      Patient was also told to buy over the counter Prilosec medication and take it once daily for GI protection as long as they are taking NSAIDs or Aspirin.     The patient was told that narcotic pain medications may make them drowsy and instructions were given to not sign legal documents, drive or operate heavy machinery, cars, or equipment while under the influence of narcotic medications.     As there were no other questions to be asked, he was given my business card along with Dr. Waldron's business card if he has any questions or concerns prior to surgery or in the postop period.          [1]   Social History  Socioeconomic History    Marital status: Single   Tobacco Use    Smoking status: Never    Smokeless tobacco: Never   Substance and Sexual Activity    Alcohol use: No     Alcohol/week: 0.0 standard drinks of alcohol    Drug use: No     Social Drivers of Health     Financial Resource Strain: Patient Declined (9/22/2024)    Overall Financial Resource Strain (CARDIA)     Difficulty of Paying Living Expenses: Patient declined   Food Insecurity: Patient Declined (9/22/2024)    Hunger Vital Sign     Worried About Running Out of Food in the Last Year: Patient declined     Ran Out of Food in the Last Year: Patient declined   Transportation Needs: No Transportation Needs (7/29/2021)    Received from [facility] - Transportation     Lack of Transportation (Medical): No     Lack of Transportation (Non-Medical): No   Physical Activity: Sufficiently Active  (9/22/2024)    Exercise Vital Sign     Days of Exercise per Week: 5 days     Minutes of Exercise per Session: 120 min   Stress: No Stress Concern Present (9/22/2024)    [place] [employer] - Occupational Stress Questionnaire     Feeling of Stress : Not at all   Housing Stability: Unknown (9/22/2024)    Housing Stability Vital Sign     Unable to Pay for Housing in the Last Year: Patient declined   [2]   Current Outpatient Medications:     celecoxib (CELEBREX) 200 MG capsule, Take 1 capsule (200 mg total) by mouth once daily., Disp: 30 capsule, Rfl: 1    dextromethorphan-guaiFENesin  mg (MUCINEX DM)  mg per 12 hr tablet, Take 1 tablet by mouth every 12 (twelve) hours. (Patient not taking: Reported on 3/25/2025), Disp: , Rfl:     esomeprazole (NEXIUM) 40 MG capsule, Take 1 capsule (40 mg total) by mouth before breakfast. (Patient not taking: Reported on 3/25/2025), Disp: 20 capsule, Rfl: 1    ibuprofen (ADVIL,MOTRIN) 200 MG tablet, Take 200 mg by mouth every 6 (six) hours as needed for Pain. (Patient not taking: Reported on 3/25/2025), Disp: , Rfl:     indomethacin (INDOCIN SR) 75 mg CpSR CR capsule, Take 1 capsule (75 mg total) by mouth 2 (two) times daily. (Patient not taking: Reported on 3/25/2025), Disp: 40 capsule, Rfl: 1    methylPREDNISolone (MEDROL DOSEPACK) 4 mg tablet, use as directed (Patient not taking: Reported on 3/25/2025), Disp: 21 each, Rfl: 0

## 2025-03-28 ENCOUNTER — TELEPHONE (OUTPATIENT)
Dept: SPORTS MEDICINE | Facility: CLINIC | Age: 20
End: 2025-03-28
Payer: COMMERCIAL

## 2025-03-28 NOTE — TELEPHONE ENCOUNTER
Spoke c pt. Informed pt of 10:30 arrival time for 03/31 surgery at the [facility]. Reminded pt of NPO status. Pt expressed understanding & was thankful.      ----- Message from Dallas Forbes sent at 3/28/2025  2:46 PM CDT -----  Regarding: procedure details  Contact: pt [phone]  Type:  Needs Medical AdviceWho Called: Ammon  is calling to get procedure instructions and time of arrival please call pt Would the patient rather a call back or a response via [facility]? Call back Best Call Back Number: pt [phone] Additional Information:

## 2025-03-31 ENCOUNTER — ANESTHESIA (OUTPATIENT)
Dept: SURGERY | Facility: HOSPITAL | Age: 20
End: 2025-03-31
Payer: COMMERCIAL

## 2025-03-31 ENCOUNTER — TELEPHONE (OUTPATIENT)
Dept: SPORTS MEDICINE | Facility: CLINIC | Age: 20
End: 2025-03-31
Payer: COMMERCIAL

## 2025-03-31 ENCOUNTER — HOSPITAL ENCOUNTER (OUTPATIENT)
Facility: HOSPITAL | Age: 20
Discharge: HOME | End: 2025-03-31
Attending: ORTHOPAEDIC SURGERY | Admitting: ORTHOPAEDIC SURGERY
Payer: COMMERCIAL

## 2025-03-31 VITALS
TEMPERATURE: 98 F | RESPIRATION RATE: 19 BRPM | HEIGHT: 77 IN | WEIGHT: 186 LBS | BODY MASS INDEX: 21.96 KG/M2 | SYSTOLIC BLOOD PRESSURE: 116 MMHG | DIASTOLIC BLOOD PRESSURE: 67 MMHG | OXYGEN SATURATION: 96 % | HEART RATE: 78 BPM

## 2025-03-31 DIAGNOSIS — G56.21 LESION OF RIGHT ULNAR NERVE: ICD-10-CM

## 2025-03-31 PROCEDURE — 25000003 PHARM REV CODE 250: Performed by: ANESTHESIOLOGY

## 2025-03-31 PROCEDURE — 63600175 PHARM REV CODE 636 W HCPCS: Performed by: NURSE ANESTHETIST, CERTIFIED REGISTERED

## 2025-03-31 PROCEDURE — 63600175 PHARM REV CODE 636 W HCPCS: Performed by: ORTHOPAEDIC SURGERY

## 2025-03-31 PROCEDURE — 71000033 HC RECOVERY, INTIAL HOUR: Performed by: ORTHOPAEDIC SURGERY

## 2025-03-31 PROCEDURE — 37000009 HC ANESTHESIA EA ADD 15 MINS: Performed by: ORTHOPAEDIC SURGERY

## 2025-03-31 PROCEDURE — 25000003 PHARM REV CODE 250: Performed by: NURSE ANESTHETIST, CERTIFIED REGISTERED

## 2025-03-31 PROCEDURE — 64718 REVISE ULNAR NERVE AT ELBOW: CPT | Mod: RT,,, | Performed by: ORTHOPAEDIC SURGERY

## 2025-03-31 PROCEDURE — 37000008 HC ANESTHESIA 1ST 15 MINUTES: Performed by: ORTHOPAEDIC SURGERY

## 2025-03-31 PROCEDURE — 36000706: Performed by: ORTHOPAEDIC SURGERY

## 2025-03-31 PROCEDURE — D9220A PRA ANESTHESIA: Mod: CRNA,,, | Performed by: NURSE ANESTHETIST, CERTIFIED REGISTERED

## 2025-03-31 PROCEDURE — 25000003 PHARM REV CODE 250: Performed by: PHYSICIAN ASSISTANT

## 2025-03-31 PROCEDURE — 63600175 PHARM REV CODE 636 W HCPCS: Performed by: PHYSICIAN ASSISTANT

## 2025-03-31 PROCEDURE — 36000707: Performed by: ORTHOPAEDIC SURGERY

## 2025-03-31 PROCEDURE — 71000015 HC POSTOP RECOV 1ST HR: Performed by: ORTHOPAEDIC SURGERY

## 2025-03-31 PROCEDURE — D9220A PRA ANESTHESIA: Mod: ANES,,, | Performed by: ANESTHESIOLOGY

## 2025-03-31 PROCEDURE — 99900035 HC TECH TIME PER 15 MIN (STAT)

## 2025-03-31 PROCEDURE — 64718 REVISE ULNAR NERVE AT ELBOW: CPT | Mod: 82,RT,, | Performed by: STUDENT IN AN ORGANIZED HEALTH CARE EDUCATION/TRAINING PROGRAM

## 2025-03-31 PROCEDURE — 94761 N-INVAS EAR/PLS OXIMETRY MLT: CPT

## 2025-03-31 PROCEDURE — 27201423 OPTIME MED/SURG SUP & DEVICES STERILE SUPPLY: Performed by: ORTHOPAEDIC SURGERY

## 2025-03-31 RX ORDER — OXYCODONE HYDROCHLORIDE 5 MG/1
10 TABLET ORAL EVERY 4 HOURS PRN
Status: DISCONTINUED | OUTPATIENT
Start: 2025-03-31 | End: 2025-03-31 | Stop reason: HOSPADM

## 2025-03-31 RX ORDER — CELECOXIB 200 MG/1
400 CAPSULE ORAL
Status: COMPLETED | OUTPATIENT
Start: 2025-03-31 | End: 2025-03-31

## 2025-03-31 RX ORDER — VANCOMYCIN HYDROCHLORIDE 500 MG/10ML
INJECTION, POWDER, LYOPHILIZED, FOR SOLUTION INTRAVENOUS
Status: DISCONTINUED | OUTPATIENT
Start: 2025-03-31 | End: 2025-03-31 | Stop reason: HOSPADM

## 2025-03-31 RX ORDER — FENTANYL CITRATE 50 UG/ML
INJECTION, SOLUTION INTRAMUSCULAR; INTRAVENOUS
Status: DISCONTINUED | OUTPATIENT
Start: 2025-03-31 | End: 2025-03-31

## 2025-03-31 RX ORDER — ROCURONIUM BROMIDE 10 MG/ML
INJECTION, SOLUTION INTRAVENOUS
Status: DISCONTINUED | OUTPATIENT
Start: 2025-03-31 | End: 2025-03-31

## 2025-03-31 RX ORDER — ACETAMINOPHEN 500 MG
1000 TABLET ORAL
Status: COMPLETED | OUTPATIENT
Start: 2025-03-31 | End: 2025-03-31

## 2025-03-31 RX ORDER — LIDOCAINE HYDROCHLORIDE 20 MG/ML
INJECTION INTRAVENOUS
Status: DISCONTINUED | OUTPATIENT
Start: 2025-03-31 | End: 2025-03-31

## 2025-03-31 RX ORDER — ONDANSETRON HYDROCHLORIDE 2 MG/ML
4 INJECTION, SOLUTION INTRAVENOUS EVERY 12 HOURS PRN
Status: DISCONTINUED | OUTPATIENT
Start: 2025-03-31 | End: 2025-03-31 | Stop reason: HOSPADM

## 2025-03-31 RX ORDER — MORPHINE SULFATE 2 MG/ML
2 INJECTION, SOLUTION INTRAMUSCULAR; INTRAVENOUS EVERY 10 MIN PRN
Status: DISCONTINUED | OUTPATIENT
Start: 2025-03-31 | End: 2025-03-31 | Stop reason: HOSPADM

## 2025-03-31 RX ORDER — ONDANSETRON HYDROCHLORIDE 2 MG/ML
INJECTION, SOLUTION INTRAVENOUS
Status: DISCONTINUED | OUTPATIENT
Start: 2025-03-31 | End: 2025-03-31

## 2025-03-31 RX ORDER — DEXAMETHASONE SODIUM PHOSPHATE 4 MG/ML
INJECTION, SOLUTION INTRA-ARTICULAR; INTRALESIONAL; INTRAMUSCULAR; INTRAVENOUS; SOFT TISSUE
Status: DISCONTINUED | OUTPATIENT
Start: 2025-03-31 | End: 2025-03-31

## 2025-03-31 RX ORDER — BUPIVACAINE HYDROCHLORIDE 2.5 MG/ML
INJECTION, SOLUTION EPIDURAL; INFILTRATION; INTRACAUDAL; PERINEURAL
Status: DISCONTINUED | OUTPATIENT
Start: 2025-03-31 | End: 2025-03-31 | Stop reason: HOSPADM

## 2025-03-31 RX ORDER — OXYCODONE HYDROCHLORIDE 5 MG/1
5 TABLET ORAL
Status: DISCONTINUED | OUTPATIENT
Start: 2025-03-31 | End: 2025-03-31 | Stop reason: HOSPADM

## 2025-03-31 RX ORDER — HALOPERIDOL LACTATE 5 MG/ML
0.5 INJECTION, SOLUTION INTRAMUSCULAR EVERY 10 MIN PRN
Status: DISCONTINUED | OUTPATIENT
Start: 2025-03-31 | End: 2025-03-31 | Stop reason: HOSPADM

## 2025-03-31 RX ORDER — PROMETHAZINE HYDROCHLORIDE 25 MG/1
25 TABLET ORAL EVERY 6 HOURS PRN
Status: DISCONTINUED | OUTPATIENT
Start: 2025-03-31 | End: 2025-03-31 | Stop reason: HOSPADM

## 2025-03-31 RX ORDER — SODIUM CHLORIDE 9 MG/ML
INJECTION, SOLUTION INTRAVENOUS CONTINUOUS
Status: DISCONTINUED | OUTPATIENT
Start: 2025-03-31 | End: 2025-03-31 | Stop reason: HOSPADM

## 2025-03-31 RX ORDER — SODIUM CHLORIDE 0.9 % (FLUSH) 0.9 %
3 SYRINGE (ML) INJECTION
Status: DISCONTINUED | OUTPATIENT
Start: 2025-03-31 | End: 2025-03-31 | Stop reason: HOSPADM

## 2025-03-31 RX ORDER — PROPOFOL 10 MG/ML
VIAL (ML) INTRAVENOUS
Status: DISCONTINUED | OUTPATIENT
Start: 2025-03-31 | End: 2025-03-31

## 2025-03-31 RX ORDER — TRAMADOL HYDROCHLORIDE 50 MG/1
100 TABLET ORAL EVERY 6 HOURS PRN
Status: DISCONTINUED | OUTPATIENT
Start: 2025-03-31 | End: 2025-03-31 | Stop reason: HOSPADM

## 2025-03-31 RX ORDER — HYDROMORPHONE HYDROCHLORIDE 1 MG/ML
0.2 INJECTION, SOLUTION INTRAMUSCULAR; INTRAVENOUS; SUBCUTANEOUS EVERY 5 MIN PRN
Status: DISCONTINUED | OUTPATIENT
Start: 2025-03-31 | End: 2025-03-31 | Stop reason: HOSPADM

## 2025-03-31 RX ORDER — CEFAZOLIN 2 G/1
2 INJECTION, POWDER, FOR SOLUTION INTRAMUSCULAR; INTRAVENOUS
Status: DISCONTINUED | OUTPATIENT
Start: 2025-03-31 | End: 2025-03-31 | Stop reason: HOSPADM

## 2025-03-31 RX ORDER — MIDAZOLAM HYDROCHLORIDE 1 MG/ML
INJECTION INTRAMUSCULAR; INTRAVENOUS
Status: DISCONTINUED | OUTPATIENT
Start: 2025-03-31 | End: 2025-03-31

## 2025-03-31 RX ADMIN — ONDANSETRON 8 MG: 2 INJECTION INTRAMUSCULAR; INTRAVENOUS at 02:03

## 2025-03-31 RX ADMIN — LIDOCAINE HYDROCHLORIDE 100 MG: 20 INJECTION INTRAVENOUS at 01:03

## 2025-03-31 RX ADMIN — PROPOFOL 200 MG: 10 INJECTION, EMULSION INTRAVENOUS at 01:03

## 2025-03-31 RX ADMIN — CEFAZOLIN 2 G: 2 INJECTION, POWDER, FOR SOLUTION INTRAMUSCULAR; INTRAVENOUS at 01:03

## 2025-03-31 RX ADMIN — SODIUM CHLORIDE: 9 INJECTION, SOLUTION INTRAVENOUS at 12:03

## 2025-03-31 RX ADMIN — CELECOXIB 400 MG: 100 CAPSULE ORAL at 12:03

## 2025-03-31 RX ADMIN — MIDAZOLAM HYDROCHLORIDE 2 MG: 1 INJECTION, SOLUTION INTRAMUSCULAR; INTRAVENOUS at 01:03

## 2025-03-31 RX ADMIN — DEXAMETHASONE SODIUM PHOSPHATE 12 MG: 4 INJECTION, SOLUTION INTRAMUSCULAR; INTRAVENOUS at 01:03

## 2025-03-31 RX ADMIN — ACETAMINOPHEN 1000 MG: 500 TABLET ORAL at 12:03

## 2025-03-31 RX ADMIN — FENTANYL CITRATE 50 MCG: 50 INJECTION, SOLUTION INTRAMUSCULAR; INTRAVENOUS at 01:03

## 2025-03-31 RX ADMIN — ROCURONIUM BROMIDE 50 MG: 10 INJECTION, SOLUTION INTRAVENOUS at 01:03

## 2025-03-31 RX ADMIN — FENTANYL CITRATE 50 MCG: 50 INJECTION, SOLUTION INTRAMUSCULAR; INTRAVENOUS at 02:03

## 2025-03-31 RX ADMIN — SODIUM CHLORIDE: 9 INJECTION, SOLUTION INTRAVENOUS at 01:03

## 2025-03-31 RX ADMIN — SUGAMMADEX 200 MG: 100 INJECTION, SOLUTION INTRAVENOUS at 02:03

## 2025-03-31 NOTE — TELEPHONE ENCOUNTER
Spoke c pt. Informed him of new arrival time, 1130 for today's surgery. Pt expressed understanding & was thankful.

## 2025-03-31 NOTE — ANESTHESIA POSTPROCEDURE EVALUATION
Anesthesia Post Evaluation    Patient: Ammon Jones    Procedure(s) Performed: Procedure(s) (LRB):  SUBCUTANEOUS TRANSPOSITION, NERVE, ULNAR (Right)  ULNAR DECOMPRESSION, NERVE (Right)    Final Anesthesia Type: general      Patient location during evaluation: PACU  Patient participation: Yes- Able to Participate  Level of consciousness: awake and alert and oriented  Post-procedure vital signs: reviewed and stable  Pain management: adequate  Airway patency: patent    PONV status at discharge: No PONV  Anesthetic complications: no      Cardiovascular status: hemodynamically stable  Respiratory status: nasal cannula  Hydration status: euvolemic  Follow-up not needed.          Vitals Value Taken Time   /67 03/31/25 16:16   Temp 36.7 °C (98 °F) 03/31/25 15:20   Pulse 80 03/31/25 16:18   Resp 25 03/31/25 16:17   SpO2 96 % 03/31/25 16:18   Vitals shown include unfiled device data.      Event Time   Out of Recovery 16:00:00         Pain/Jerry Score: Pain Rating Prior to Med Admin: 4 (3/31/2025 12:20 PM)  Jerry Score: 9 (3/31/2025  4:00 PM)

## 2025-03-31 NOTE — PROGRESS NOTES
Pre op completed.  Patient belongings to be given to mother. Bed in lowest position. Call light within reach. Family at beside. DME not needed. Bear hugger refused.

## 2025-03-31 NOTE — OPERATIVE NOTE ADDENDUM
Certification of Assistant at Surgery       Surgery Date: 3/31/2025     Participating Surgeons:  Surgeons and Role:     * STEFANI Waldron MD - Primary    Procedures:  Procedure(s) (LRB):  SUBCUTANEOUS TRANSPOSITION, NERVE, ULNAR (Right)  ULNAR DECOMPRESSION, NERVE (Right)    Assistant Surgeon's Certification of Necessity:  I understand that section 1842 (b) (6) (d) of the Social Security Act generally prohibits Medicare Part B reasonable charge payment for the services of assistants at surgery in teaching hospitals when qualified residents are available to furnish such services. I certify that the services for which payment is claimed were medically necessary, and that no qualified resident was available to perform the services. I further understand that these services are subject to post-payment review by the Medicare carrier.      Lambert Jaramillo MD    03/31/2025  3:26 PM

## 2025-03-31 NOTE — TRANSFER OF CARE
"Anesthesia Transfer of Care Note    Patient: Ammon Jones    Procedure(s) Performed: Procedure(s) (LRB):  SUBCUTANEOUS TRANSPOSITION, NERVE, ULNAR (Right)  ULNAR DECOMPRESSION, NERVE (Right)    Patient location: PACU    Anesthesia Type: general    Transport from OR: Transported from OR on 6-10 L/min O2 by face mask with adequate spontaneous ventilation    Post pain: adequate analgesia    Post assessment: no apparent anesthetic complications    Post vital signs: stable    Level of consciousness: sedated    Nausea/Vomiting: no nausea/vomiting    Complications: none    Transfer of care protocol was followed      Last vitals: Visit Vitals  BP (!) 93/55 (BP Location: Left arm, Patient Position: Lying)   Pulse 72   Temp 36.7 °C (98 °F) (Temporal)   Resp 16   Ht 6' 5" (1.956 m)   Wt 84.4 kg (186 lb)   SpO2 97%   BMI 22.06 kg/m²     "

## 2025-03-31 NOTE — ANESTHESIA PROCEDURE NOTES
Intubation    Date/Time: 3/31/2025 1:43 PM    Performed by: Sallie Schmidt CRNA  Authorized by: Sallie Schmidt CRNA    Intubation:     Induction:  Intravenous    Intubated:  Postinduction    Mask Ventilation:  Easy mask    Attempts:  1    Attempted By:  CRNA    Method of Intubation:  Video laryngoscopy    Blade:  Byers 4    Laryngeal View Grade: Grade I - full view of cords      Difficult Airway Encountered?: No      Complications:  None    Airway Device:  Oral endotracheal tube    Airway Device Size:  7.5    Style/Cuff Inflation:  Cuffed (inflated to minimal occlusive pressure)    Tube secured:  22    Secured at:  The lips    Placement Verified By:  Capnometry    Complicating Factors:  None    Findings Post-Intubation:  BS equal bilateral and atraumatic/condition of teeth unchanged

## 2025-03-31 NOTE — BRIEF OP NOTE
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    DISPOSITION: Home or Self Care    FINAL DIAGNOSIS:  Subluxation of ulnar nerve     FOLLOWUP: In clinic    DISCHARGE INSTRUCTIONS:    Discharge Procedure Orders   Diet general     Call MD for:  temperature >100.4     Call MD for:  persistent nausea and vomiting     Call MD for:  severe uncontrolled pain     Call MD for:  difficulty breathing, headache or visual disturbances     Call MD for:  redness, tenderness, or signs of infection (pain, swelling, redness, odor or green/yellow discharge around incision site)     Call MD for:  hives     Call MD for:  persistent dizziness or light-headedness

## 2025-03-31 NOTE — ANESTHESIA PREPROCEDURE EVALUATION
03/31/2025  Ammon Jones is a 20 y.o., male.      Pre-op Assessment    I have reviewed the Patient Summary Reports.     I have reviewed the Nursing Notes.    I have reviewed the Medications.     Review of Systems  Anesthesia Hx:  No problems with previous Anesthesia   History of prior surgery of interest to airway management or planning:  Previous anesthesia: MAC       1/13/2022  EGD with biopsy with MAC.     Denies Personal Hx of Anesthesia complications.                    Social:  Non-Smoker, No Alcohol Use       Hematology/Oncology:  Hematology Normal   Oncology Normal                                   EENT/Dental:   H/O Tympanostomy Tube Placement,  Adenoidectomy          Cardiovascular:  Exercise tolerance: good      Denies MI.      Denies Dysrhythmias.         Denies LAZO.    Patient not on beta blockers                          Pulmonary:  Pulmonary Normal    Denies Asthma.   Denies Shortness of breath.                  Renal/:  Renal/ Normal                 Hepatic/GI:  Hepatic/GI Normal     Denies GERD.   H/O Eosinophilic esophagitis  Not Taking GLP-1 Agonists            Musculoskeletal:     Lesion of right ulnar nerve,            Neurological:  Neurology Normal      Denies Seizures.                                Endocrine:  Endocrine Normal Denies Diabetes. Denies Hypothyroidism.          Psych:  Psychiatric Normal                  Physical Exam  General: Well nourished and Cooperative    Airway:  Mallampati: I   Mouth Opening: Normal  TM Distance: Normal  Tongue: Normal    Dental:  Intact    Chest/Lungs:  Normal Respiratory Rate    Heart:  Rhythm: Regular Rhythm      History reviewed. No pertinent past medical history.  Past Surgical History:   Procedure Laterality Date    ADENOIDECTOMY Bilateral     TYMPANOSTOMY TUBE PLACEMENT Bilateral        Anesthesia Assessment: Preoperative  EQUATION    Planned Procedure: Procedure(s) (LRB):  TRANSPOSITION, NERVE, ULNAR (Right)  Requested Anesthesia Type:General  Surgeon: STEFANI Waldron MD  Service: Orthopedics  Known or anticipated Date of Surgery:3/31/2025    Surgeon notes: reviewed    Electronic QUestionnaire Assessment completed via nurse interview with patient.        Triage considerations:     The patient has no apparent active cardiac condition (No unstable coronary Syndrome such as severe unstable angina or recent [<1 month] myocardial infarction, decompensated CHF, severe valvular   disease or significant arrhythmia)    Previous anesthesia records:MAC and No problems    Last PCP note: > 1 year ago , outside [facility]   Subspecialty notes: n/a    Other important co-morbidities:  No co-morbidities noted.        Tests already available:  No recent tests.             Instructions given. (See in Nurse's note) Preop medication instructions sent via portal message.     Optimization:  Anesthesia Preop Clinic Assessment Not Indicated    Medical Opinion Indicated: No       Sub-specialist consult indicated: No      Plan: Consultation: Medical clearance is not requested.        Navigation:  Straight Line to surgery.               No tests, anesthesia preop clinic visit, or consult required.                        Patient is OK to proceed with surgery at [facility].       Ht: 6'5  Wt: 84.8 kg (186 lb)  BMI: 22.17    Anesthesia Plan  Type of Anesthesia, risks & benefits discussed:    Anesthesia Type: Gen ETT  Intra-op Monitoring Plan: Standard ASA Monitors  Post Op Pain Control Plan: multimodal analgesia and IV/PO Opioids PRN  Induction:  IV  Informed Consent: Informed consent signed with the Patient and all parties understand the risks and agree with anesthesia plan.  All questions answered.   ASA Score: 1    Ready For Surgery From Anesthesia Perspective.     .

## 2025-03-31 NOTE — OP NOTE
[facility]   OPERATIVE REPORT   ORTHOPAEDIC SURGERY   PROVIDER: DR. CHARLES BRINK    PATIENT INFORMATION   Ammon Jones 20 y.o. male 2005   MRN: [id number]   LOCATION: [facility]     DATE OF PROCEDURE: 3/31/2025     PREOPERATIVE DIAGNOSES:   Right elbow subluxating ulnar nerve    POSTOPERATIVE DIAGNOSES:   Right elbow subluxating ulnar nerve    PROCEDURES PERFORMED:   Right elbow open ulnar nerve decompression with anterior subfascial transposition (CPT 12631)    SURGEON: CHARLES Brink MD     ASSISTANTS:  Mal Jaramillo MD - Fellow - First Assist  SMA Mack     First Assistant Duties: A first assistant was necessary for this procedure. Assistance was provided for surgical wound exposure, manipulation, and retractor placement and positioning. There was no qualified resident available for the procedure.      ANESTHESIA: General with local anesthetic injection (0.5% Marcaine)     ESTIMATED BLOOD LOSS: Minimal    IMPLANTS: * No implants in log *     SPECIMENS: None.    COMPLICATIONS: None.     INTRAOPERATIVE COUNTS: Correct.     PROPHYLACTIC IV ANTIBIOTICS: Given per [facility] Protocol.    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  The patient has failed prior conservative treatment and has been indicated for open ulnar nerve decompression with anterior transposition.  Full informed consent was obtained prior to proceeding.  He has had 2 previous MRI studies in a dynamic ultrasound stress test documenting a normal UCL.  The elbow is structurally intact otherwise.    DETAILS OF PROCEDURE:  I met with the patient and his mother in the preoperative holding area.  All final questions were answered.  The operative site was identified and marked.  No block was performed preoperatively.  The  patient was then brought back to the operating room and placed supine.  General anesthesia was administered.    Examination under anesthesia of the right elbow demonstrated normal varus and valgus stability.  There was hypermobility of the ulnar nerve with intermittent subluxation noted on passive range of motion.    Right upper extremity was prepped and draped in the usual sterile fashion for open elbow surgery.  A sterile tourniquet was utilized in this case.    An approximate 5 cm curvilinear incision was made posterior to the medial epicondyle.  This was done under tourniquet control, elevated to 200 mmHg.  Dissection was carried down carefully through the subcutaneous tissue layer.  There was a prominent medial antebrachial cutaneous branch that was encountered at the level of the medial epicondyle.  It was isolated and dissected free for partial neurolysis.  It was mobilized carefully.  The ulnar nerve was identified at the level of the cubital tunnel.  Collins's fascia was carefully dissected and released and decompression of the nerve was carried distally through the superficial fascia of the 2 heads of the FCU.  The muscular heads were split and the deep fascia also was split distally ensure adequate decompression.  One motor branch to the FCU was sacrificed to allow a tension-free transposition of the nerve.  Dissection was carried proximally as well.  All potential impinging fascial tissue was transected and the arcade of [place] was identified in this case and released.  A medial slip of the intermuscular septum also was excised to ensure not impingement of the nerve upon transposition.  A small branch of the vaso vasorum also required bipolar cauterization for transposition.  Care was taken to preserve as much vascular supply to the nerve as possible.    An H cut was marked out over the common flexor bundle fascia with offset flaps created.  Any potentially impinging fascial tissue was resected over  the base of the planned transposition bed and the nerve was then transposed carefully and secured by the fascial flaps which were sutured to one another using 2-0 Vicryl suture.  Care was taken to make sure that the flaps were not sutured too tightly and a [place] elevator was used to ensure a nice open non stenotic transposition sling.  Dynamic exam of the elbow demonstrated a stable transposed nerve as well.  The medial antebrachial cutaneous branch was transposed with a nerve into a safe location.    After the transposition, the elbow was thoroughly irrigated with normal saline.  The tourniquet was released.  Hemostasis was achieved.  The subcutaneous and skin layers were closed using 4-0 Vicryl suture and 4-0 Monocryl.  Dermabond Prineo was placed over the incision.  Local anesthetic was injected around the surgical site as well for postoperative anesthesia.  Sterile dressings were applied and a well-padded posterior long-arm splint flexed to 80-90 degrees was placed.    The patient was then extubated and transferred to the postanesthesia care unit in stable condition.    POSTOPERATIVE PLAN: The postoperative rehab protocol we will be according to the below stated program.  Patient will remain in his postoperative immobilization splint until 4/10.  At that time he will be converted to a hinged elbow brace.  We will start physical therapy after that appointment.  We have discussed a 4 to 6-month timeframe for return to competitive throwing.    The patient will wear a splint for the 1st week following surgery and then will wear a hinged brace for the next 2 weeks. For the first 2 weeks the elbow will not be allowed to straighten past 20 degrees in order to maintain the new position of the ulnar nerve. Full range of motion is expected at 3 weeks post op. For throwers the expected timeline to return to playing light catch is 6 weeks following surgery.  **Residual numbness and tingling and/or weakness may lead  physician to modify the progression through the rehab protocol.    Week 1-2 Post Op  -Block Extension past 20 degrees (0 deg is straight), Flexion to patients tolerance  -Patient may begin light rotator cuff exercises including: IR/ER with theraband,  -Standing flexion/scaption (maintain 20 degrees elbow extension), scapular exercises and postural education  -Light forearm isometrics for wrist flexion and extension  -Active wrist/finger exercises  -Light gripping exercises  Week 3-4 Post Op  -Full elbow extension expected at week 3  -Progression of rotator cuff and scapular strengthening program  -Resisted forearm and wrist strengthening  -Light biceps and triceps strengthening  -Closed Chain shoulder strengthening with DS2 Platform  -Throwers begin scapular posture training ( prone T, W, V exercises, standing scapular posing, etc)  Week 5-6 Post Op  -Progressive resistive exercises including bicep, tricep, deltoid, trap, pectoralis to restore normal strength  -Following week 6 athlete may begin interval throwing/hitting program if strength, ROM, and healing allow. (Begin only after cleared by Dr. Waldron)

## 2025-03-31 NOTE — PLAN OF CARE
Discharge instructions given and explained to patient and family with verbalization of understanding all instructions. Patient is AAOx3,v/s stable, denies n/v and tolerating po, rates pain level tolerable, IV removed, and family at bedside. Patient discharged to home with sling and medications. Patient transported off unit via wheelchair to private vehicle with family.

## 2025-04-10 ENCOUNTER — OFFICE VISIT (OUTPATIENT)
Dept: SPORTS MEDICINE | Facility: CLINIC | Age: 20
End: 2025-04-10
Payer: COMMERCIAL

## 2025-04-10 ENCOUNTER — CLINICAL SUPPORT (OUTPATIENT)
Dept: REHABILITATION | Facility: HOSPITAL | Age: 20
End: 2025-04-10
Payer: COMMERCIAL

## 2025-04-10 VITALS — HEART RATE: 67 BPM | DIASTOLIC BLOOD PRESSURE: 74 MMHG | SYSTOLIC BLOOD PRESSURE: 112 MMHG

## 2025-04-10 DIAGNOSIS — Z47.89 SURGICAL AFTERCARE, MUSCULOSKELETAL SYSTEM: Primary | ICD-10-CM

## 2025-04-10 DIAGNOSIS — M25.521 CHRONIC ELBOW PAIN, RIGHT: Primary | ICD-10-CM

## 2025-04-10 DIAGNOSIS — G89.29 CHRONIC ELBOW PAIN, RIGHT: Primary | ICD-10-CM

## 2025-04-10 DIAGNOSIS — M62.81 MUSCLE WEAKNESS OF RIGHT UPPER EXTREMITY: ICD-10-CM

## 2025-04-10 DIAGNOSIS — G56.21 LESION OF RIGHT ULNAR NERVE: ICD-10-CM

## 2025-04-10 PROCEDURE — 99024 POSTOP FOLLOW-UP VISIT: CPT | Mod: S$GLB,,, | Performed by: PHYSICIAN ASSISTANT

## 2025-04-10 PROCEDURE — 1159F MED LIST DOCD IN RCRD: CPT | Mod: CPTII,S$GLB,, | Performed by: PHYSICIAN ASSISTANT

## 2025-04-10 PROCEDURE — 97112 NEUROMUSCULAR REEDUCATION: CPT

## 2025-04-10 PROCEDURE — 3074F SYST BP LT 130 MM HG: CPT | Mod: CPTII,S$GLB,, | Performed by: PHYSICIAN ASSISTANT

## 2025-04-10 PROCEDURE — 97140 MANUAL THERAPY 1/> REGIONS: CPT

## 2025-04-10 PROCEDURE — 99999 PR PBB SHADOW E&M-EST. PATIENT-LVL III: CPT | Mod: PBBFAC,,, | Performed by: PHYSICIAN ASSISTANT

## 2025-04-10 PROCEDURE — 1160F RVW MEDS BY RX/DR IN RCRD: CPT | Mod: CPTII,S$GLB,, | Performed by: PHYSICIAN ASSISTANT

## 2025-04-10 PROCEDURE — 3078F DIAST BP <80 MM HG: CPT | Mod: CPTII,S$GLB,, | Performed by: PHYSICIAN ASSISTANT

## 2025-04-10 PROCEDURE — 97161 PT EVAL LOW COMPLEX 20 MIN: CPT

## 2025-04-10 NOTE — PROGRESS NOTES
S:Ammon Jones presents for post-operative evaluation.     DATE OF PROCEDURE: 3/31/2025      PROCEDURES PERFORMED:   Right elbow open ulnar nerve decompression with anterior subfascial transposition (CPT 74256)    Ammon Jones reports to be doing well 1wk s/p the above mentioned procedure. Here today for splint removal. Denies fevers, chills, night sweats, chest pain, difficulty breathing, calf pain or tenderness. Starts PT today at the [place] location. Seeing good progress daily. Pain levels are improving. Has d/c'd pain medication.     O: The incision is healing well.  No signs of infection.  Perenio left in place. No significant pain or unusual tenderness.    A/P: Doing well. I will transition him to a hinged elbow brace locked at 20 degrees extension for the next week. I placed a [place] bandage over the incision- have him keep it dry until next week. No lifting anything with that arm. Plan to follow the rehab plan as previously outlined. RTC next week.     POSTOPERATIVE PLAN: The postoperative rehab protocol we will be according to the below stated program.  Patient will remain in his postoperative immobilization splint until 4/10.  At that time he will be converted to a hinged elbow brace.  We will start physical therapy after that appointment.  We have discussed a 4 to 6-month timeframe for return to competitive throwing.     The patient will wear a splint for the 1st week following surgery and then will wear a hinged brace for the next 2 weeks. For the first 2 weeks the elbow will not be allowed to straighten past 20 degrees in order to maintain the new position of the ulnar nerve. Full range of motion is expected at 3 weeks post op. For throwers the expected timeline to return to playing light catch is 6 weeks following surgery.  **Residual numbness and tingling and/or weakness may lead physician to modify the progression through the rehab protocol.     Week 1-2 Post Op  -Block Extension past 20  degrees (0 deg is straight), Flexion to patients tolerance  -Patient may begin light rotator cuff exercises including: IR/ER with theraband,  -Standing flexion/scaption (maintain 20 degrees elbow extension), scapular exercises and postural education  -Light forearm isometrics for wrist flexion and extension  -Active wrist/finger exercises  -Light gripping exercises  Week 3-4 Post Op  -Full elbow extension expected at week 3  -Progression of rotator cuff and scapular strengthening program  -Resisted forearm and wrist strengthening  -Light biceps and triceps strengthening  -Closed Chain shoulder strengthening with DS2 Platform  -Throwers begin scapular posture training ( prone T, W, V exercises, standing scapular posing, etc)  Week 5-6 Post Op  -Progressive resistive exercises including bicep, tricep, deltoid, trap, pectoralis to restore normal strength  -Following week 6 athlete may begin interval throwing/hitting program if strength, ROM, and healing allow. (Begin only after cleared by Dr. Waldron)

## 2025-04-13 ENCOUNTER — PATIENT MESSAGE (OUTPATIENT)
Dept: SPORTS MEDICINE | Facility: CLINIC | Age: 20
End: 2025-04-13
Payer: COMMERCIAL

## 2025-04-14 ENCOUNTER — CLINICAL SUPPORT (OUTPATIENT)
Dept: REHABILITATION | Facility: HOSPITAL | Age: 20
End: 2025-04-14
Payer: COMMERCIAL

## 2025-04-14 DIAGNOSIS — M25.521 CHRONIC ELBOW PAIN, RIGHT: ICD-10-CM

## 2025-04-14 DIAGNOSIS — M62.81 MUSCLE WEAKNESS OF RIGHT UPPER EXTREMITY: ICD-10-CM

## 2025-04-14 DIAGNOSIS — G89.29 CHRONIC ELBOW PAIN, RIGHT: ICD-10-CM

## 2025-04-14 DIAGNOSIS — Z78.9 IMPAIRED MOTOR CONTROL: Primary | ICD-10-CM

## 2025-04-14 DIAGNOSIS — G56.21 LESION OF RIGHT ULNAR NERVE: ICD-10-CM

## 2025-04-14 PROCEDURE — 97112 NEUROMUSCULAR REEDUCATION: CPT

## 2025-04-14 PROCEDURE — 97110 THERAPEUTIC EXERCISES: CPT

## 2025-04-14 PROCEDURE — 97140 MANUAL THERAPY 1/> REGIONS: CPT

## 2025-04-17 ENCOUNTER — OFFICE VISIT (OUTPATIENT)
Dept: SPORTS MEDICINE | Facility: CLINIC | Age: 20
End: 2025-04-17
Payer: COMMERCIAL

## 2025-04-17 VITALS
BODY MASS INDEX: 21.73 KG/M2 | HEIGHT: 77 IN | SYSTOLIC BLOOD PRESSURE: 114 MMHG | DIASTOLIC BLOOD PRESSURE: 73 MMHG | WEIGHT: 184 LBS | HEART RATE: 68 BPM

## 2025-04-17 DIAGNOSIS — Z47.89 SURGICAL AFTERCARE, MUSCULOSKELETAL SYSTEM: Primary | ICD-10-CM

## 2025-04-17 PROCEDURE — 99999 PR PBB SHADOW E&M-EST. PATIENT-LVL III: CPT | Mod: PBBFAC,,, | Performed by: PHYSICIAN ASSISTANT

## 2025-04-17 PROCEDURE — 3078F DIAST BP <80 MM HG: CPT | Mod: CPTII,S$GLB,, | Performed by: PHYSICIAN ASSISTANT

## 2025-04-17 PROCEDURE — 1160F RVW MEDS BY RX/DR IN RCRD: CPT | Mod: CPTII,S$GLB,, | Performed by: PHYSICIAN ASSISTANT

## 2025-04-17 PROCEDURE — 99024 POSTOP FOLLOW-UP VISIT: CPT | Mod: S$GLB,,, | Performed by: PHYSICIAN ASSISTANT

## 2025-04-17 PROCEDURE — 3074F SYST BP LT 130 MM HG: CPT | Mod: CPTII,S$GLB,, | Performed by: PHYSICIAN ASSISTANT

## 2025-04-17 PROCEDURE — 1159F MED LIST DOCD IN RCRD: CPT | Mod: CPTII,S$GLB,, | Performed by: PHYSICIAN ASSISTANT

## 2025-04-17 NOTE — PROGRESS NOTES
S:Ammon Jones presents for post-operative evaluation.     DATE OF PROCEDURE: 3/31/2025      PROCEDURES PERFORMED:   Right elbow open ulnar nerve decompression with anterior subfascial transposition (CPT 96964)    Ammon Jones reports to be doing well 2wk s/p the above mentioned procedure. Here today for wound check. Denies fevers, chills, night sweats, chest pain, difficulty breathing, calf pain or tenderness. PT today at the [place] location. Seeing good progress daily. Pain levels are improving. Has d/c'd pain medication. Wearing hinged elbow brace as instructed.    O: The incision is healing well.  No signs of infection. No significant pain or unusual tenderness.    A/P: Doing well. Continue hinged elbow brace but ok to start progressing full ROM. Continue PT. Ok to shower with it uncovered. No soaks or topicals. No lifting anything with that arm. Plan to follow the rehab plan as previously outlined. RTC 4 weeks with Dr. Waldron.     POSTOPERATIVE PLAN: The postoperative rehab protocol we will be according to the below stated program.  Patient will remain in his postoperative immobilization splint until 4/10.  At that time he will be converted to a hinged elbow brace.  We will start physical therapy after that appointment.  We have discussed a 4 to 6-month timeframe for return to competitive throwing.     The patient will wear a splint for the 1st week following surgery and then will wear a hinged brace for the next 2 weeks. For the first 2 weeks the elbow will not be allowed to straighten past 20 degrees in order to maintain the new position of the ulnar nerve. Full range of motion is expected at 3 weeks post op. For throwers the expected timeline to return to playing light catch is 6 weeks following surgery.  **Residual numbness and tingling and/or weakness may lead physician to modify the progression through the rehab protocol.     Week 1-2 Post Op  -Block Extension past 20 degrees (0 deg is  straight), Flexion to patients tolerance  -Patient may begin light rotator cuff exercises including: IR/ER with theraband,  -Standing flexion/scaption (maintain 20 degrees elbow extension), scapular exercises and postural education  -Light forearm isometrics for wrist flexion and extension  -Active wrist/finger exercises  -Light gripping exercises  Week 3-4 Post Op  -Full elbow extension expected at week 3  -Progression of rotator cuff and scapular strengthening program  -Resisted forearm and wrist strengthening  -Light biceps and triceps strengthening  -Closed Chain shoulder strengthening with DS2 Platform  -Throwers begin scapular posture training ( prone T, W, V exercises, standing scapular posing, etc)  Week 5-6 Post Op  -Progressive resistive exercises including bicep, tricep, deltoid, trap, pectoralis to restore normal strength  -Following week 6 athlete may begin interval throwing/hitting program if strength, ROM, and healing allow. (Begin only after cleared by Dr. Waldron)

## 2025-04-17 NOTE — PROGRESS NOTES
[facility]   Physical Therapy Initial Evaluation      Name: Ammon Jones  Clinic Number: [id number]    Therapy Diagnosis:   Encounter Diagnoses   Name Primary?    Lesion of right ulnar nerve     Chronic elbow pain, right Yes    Muscle weakness of right upper extremity         Physician: Pietro Trinidad*    Physician Orders: PT Eval and Treat   Medical Diagnosis from Referral: G56.21 (ICD-10-CM) - Lesion of right ulnar nerve   Evaluation Date: 4/10/2025  Authorization Period Expiration: 3/27/26  Plan of Care Expiration: 08/01/2025  Progress Note Due: 06/01/25  Visit # / Visits authorized: 1/ 1   FOTO: 1/N    Precautions: Standard     DOS: 3/31/25    PROCEDURES PERFORMED:   Right elbow open ulnar nerve decompression with anterior subfascial transposition (CPT 55082)    POSTOPERATIVE PLAN: The postoperative rehab protocol we will be according to the below stated program.  Patient will remain in his postoperative immobilization splint until 4/10.  At that time he will be converted to a hinged elbow brace.  We will start physical therapy after that appointment.  We have discussed a 4 to 6-month timeframe for return to competitive throwing.     The patient will wear a splint for the 1st week following surgery and then will wear a hinged brace for the next 2 weeks. For the first 2 weeks the elbow will not be allowed to straighten past 20 degrees in order to maintain the new position of the ulnar nerve. Full range of motion is expected at 3 weeks post op. For throwers the expected timeline to return to playing light catch is 6 weeks following surgery.  **Residual numbness and tingling and/or weakness may lead physician to modify the progression through the rehab protocol.     Week 1-2 Post Op  -Block Extension past 20 degrees (0 deg is straight), Flexion to patients tolerance  -Patient may begin light rotator cuff exercises including: IR/ER with theraband,  -Standing flexion/scaption  (maintain 20 degrees elbow extension), scapular exercises and postural education  -Light forearm isometrics for wrist flexion and extension  -Active wrist/finger exercises  -Light gripping exercises  Week 3-4 Post Op  -Full elbow extension expected at week 3  -Progression of rotator cuff and scapular strengthening program  -Resisted forearm and wrist strengthening  -Light biceps and triceps strengthening  -Closed Chain shoulder strengthening with DS2 Platform  -Throwers begin scapular posture training ( prone T, W, V exercises, standing scapular posing, etc)  Week 5-6 Post Op  -Progressive resistive exercises including bicep, tricep, deltoid, trap, pectoralis to restore normal strength  -Following week 6 athlete may begin interval throwing/hitting program if strength, ROM, and healing allow. (Begin only after cleared by Dr. 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 He then felt some radiating numbness and tingling down the forearm into the ulnar aspect of the hand. He attempted to pitch again but his velocity decreased significantly and he was pulled thereafter. He was seen at \[facility]\"" by Dr. Waldron. MRI showed no UCL tear but sxs were indicative of ulnar nerve subluxation. Ulnar N transposition was performed on the above date. He presents 10 days s/p for post-op rehab. He denies presence of neck pain or color/temperature changes in his arm or hand. Patient denies fever, chills, body aches, vomiting, or general malaise. He has one year of eligibility left and wants to see that through with [employer].       Imaging: MRI studies:     Prior Therapy: For previous UE issues with me   Social  History: lives with a friend  Occupation: RHP with [employer]   Prior Level of Function: Independent  Current Level of Function: Unable to participate in scholarship dependent sport     Pain:  Current 2/10, worst 4/10, best 1/10   Location: right elbow   Description: Aching, Dull, and Tight  Aggravating Factors: Extension and Flexing  Easing Factors: ice and rest    Patients goals: Play his last season with [employer]     Medical History:   No past medical history on file.    Surgical History:   Ammon Jones  has a past surgical history that includes Tympanostomy tube placement (Bilateral); Adenoidectomy (Bilateral); Ulnar nerve transposition (Right, 3/31/2025); and Decompression of nerve (Right, 3/31/2025).    Medications:   Ammon has a current medication list which includes the following prescription(s): aspirin, celecoxib, dextromethorphan-guaifenesin  mg, esomeprazole, ibuprofen, indomethacin, methylprednisolone, ondansetron, and oxycodone.    Allergies:   Review of patient's allergies indicates:  No Known Allergies     Objective        Active Range of Motion:   Elbow Left Right   Flexion 145 130   extension +5 HE 20 flx   pronation 90 85   supination 85 80           Passive Range of Motion:   Elbow Left Right   Flexion 150 135   Extension +5 HE 20 Flx   Pronation 90 85   Supination 85 90       Upper Extremity Strength  Held due to post-op status    Palpation:  Mild TTP over dressing along site of incision             Strength: (ASIF Dynamometer in lbs.) Average 3 trials, Position II:  Held due to post op status      Special Tests: Held due to post-op status             Limitation/Restriction for FOTO Elbow Survey    Therapist reviewed FOTO scores for Ammon Jones on 4/10/2025.   FOTO documents entered into EPIC - see Media section.    Limitation Score: See media section%         Treatment     Total Treatment time (time-based codes) separate from Evaluation: 37 minutes     Ammon received  "the treatments listed below:      therapeutic exercises to develop strength, endurance, ROM, and flexibility for 7 minutes including:  Open Book x15 ea.   Sub max towel gripping 20x10"      manual therapy techniques:  were applied for 8 minutes, including:  Brace adjustment donning/doffing  R Elbow PROM within post-op limits   [facility] janeth Grd III    neuromuscular re-education activities to improve: Coordination, Kinesthetic, Sense, Proprioception, Posture, and Motor Control for 19 minutes. The following activities were included:  S/L MT re-ed in locked brace 3x10   S/L ER w/5# Cuff 3x15         Patient Education and Home Exercises     Education provided:   - Prognosis, Tissue Healing Timelines, Activity Modification  - Biomechanics and principles of proximal stability for distal control     Written Home Exercises Provided: yes. Exercises were reviewed and Ammon was able to demonstrate them prior to the end of the session.  Ammon demonstrated good  understanding of the education provided. See EMR under Patient Instructions for exercises provided during therapy sessions.    Assessment     Ammon is a 20 y.o. male referred to outpatient Physical Therapy with a medical diagnosis of G56.21 (ICD-10-CM) - Lesion of right ulnar nerve. Patient presents with the following post-operative impairments:  - Dec ROM   - Dec strength/ motor control UE   - Swelling elbow   - Dec jamie to age related activities, ADLs, IADLs       Patient prognosis is Good.   Patient will benefit from skilled outpatient Physical Therapy to address the deficits stated above and in the chart below, provide patient /family education, and to maximize patientt's level of independence.     Plan of care discussed with patient: Yes  Patient's spiritual, cultural and educational needs considered and patient is agreeable to the plan of care and goals as stated below:     Anticipated Barriers for therapy: None    Medical Necessity is demonstrated by the " following  History  Co-morbidities and personal factors that may impact the plan of care Co-morbidities:   Hx of previous UE issues     Personal Factors:   no deficits     low   Examination  Body Structures and Functions, activity limitations and participation restrictions that may impact the plan of care Body Regions:   neck  back  lower extremities  upper extremities  trunk    Body Systems:    gross symmetry  ROM  strength  gross coordinated movement  motor control  motor learning    Participation Restrictions:   Unable to participate in scholarship dependent sport     Activity limitations:   Learning and applying knowledge  No deficit    General Tasks and Commands  No deficit    Communication  No deficit    Mobility  lifting and carrying objects    Self care  No deficit    Domestic Life  No deficit    Interactions/Relationships  No deficit    Life Areas  Recreational Activities to A with health and wellness     Community and Social Life  No deficit          moderate   Clinical Presentation stable and uncomplicated low   Decision Making/ Complexity Score: low       Short Term Goals: 2-4 weeks  1. Pt will be compliant with HEP 50% of prescribed amount.   2. The pt to demo improvement in R Elbow ROM equal to uninvolved side   3.  Pt will improve FOTO score to meet or exceed MCID   4. 3-jaw kaden  strength 70% LSI    Long Term Goals: 12 weeks   Pt will be compliant with % of prescribed amount.   Pt will demo 110% LSI strength via HHD for shoulder girdle R, ER:IR 70%, ER:BW: 20%  Pt will progress through UE plyo and interval throwing program   The pt will report full participation in ADLs and IADLs without restrictions related to R UE.     Plan     Plan of care Certification: 4/10/2025 to 8/1/25.    Outpatient Physical Therapy 2 times weekly for 12 weeks to include the following interventions: Electrical Stimulation  , Manual Therapy, Moist Heat/ Ice, Neuromuscular Re-ed, Patient Education, Self Care,  Therapeutic Activities, Therapeutic Exercise, and Dry Needling .     Atif Nichole, PT, DPT  Board Certified in Sports Physical Therapy

## 2025-04-21 ENCOUNTER — TELEPHONE (OUTPATIENT)
Dept: SPORTS MEDICINE | Facility: CLINIC | Age: 20
End: 2025-04-21
Payer: COMMERCIAL

## 2025-04-21 ENCOUNTER — CLINICAL SUPPORT (OUTPATIENT)
Dept: REHABILITATION | Facility: HOSPITAL | Age: 20
End: 2025-04-21
Payer: COMMERCIAL

## 2025-04-21 DIAGNOSIS — G89.29 CHRONIC ELBOW PAIN, RIGHT: ICD-10-CM

## 2025-04-21 DIAGNOSIS — Z78.9 IMPAIRED MOTOR CONTROL: ICD-10-CM

## 2025-04-21 DIAGNOSIS — M25.521 CHRONIC ELBOW PAIN, RIGHT: ICD-10-CM

## 2025-04-21 DIAGNOSIS — M62.81 MUSCLE WEAKNESS OF RIGHT UPPER EXTREMITY: Primary | ICD-10-CM

## 2025-04-21 PROCEDURE — 97140 MANUAL THERAPY 1/> REGIONS: CPT

## 2025-04-21 PROCEDURE — 97110 THERAPEUTIC EXERCISES: CPT

## 2025-04-21 PROCEDURE — 97112 NEUROMUSCULAR REEDUCATION: CPT

## 2025-04-21 NOTE — PROGRESS NOTES
[facility]   Physical Therapy Treatment Note      Name: Ammon Jones  [facility] Number: [id number]    Therapy Diagnosis:   Encounter Diagnoses   Name Primary?    Muscle weakness of right upper extremity Yes    Chronic elbow pain, right     Impaired motor control      Physician: Sarmad Prince DO    Visit Date: 4/21/2025    Physician Orders: PT Eval and Treat   Medical Diagnosis from Referral: G56.21 (ICD-10-CM) - Lesion of right ulnar nerve   Evaluation Date: 4/10/2025  Authorization Period Expiration: 3/27/26  Plan of Care Expiration: 08/01/2025  Progress Note Due: 06/01/25  Visit # / Visits authorized: 5/ 20   FOTO: 1/N     Precautions: Standard     Time In: 1430  Time Out: 1530  Total Billable Time: 55 minutes    DOS: 3/31/25     PROCEDURES PERFORMED:   Right elbow open ulnar nerve decompression with anterior subfascial transposition (CPT 62330)     POSTOPERATIVE PLAN: The postoperative rehab protocol we will be according to the below stated program.  Patient will remain in his postoperative immobilization splint until 4/10.  At that time he will be converted to a hinged elbow brace.  We will start physical therapy after that appointment.  We have discussed a 4 to 6-month timeframe for return to competitive throwing.     The patient will wear a splint for the 1st week following surgery and then will wear a hinged brace for the next 2 weeks. For the first 2 weeks the elbow will not be allowed to straighten past 20 degrees in order to maintain the new position of the ulnar nerve. Full range of motion is expected at 3 weeks post op. For throwers the expected timeline to return to playing light catch is 6 weeks following surgery.  **Residual numbness and tingling and/or weakness may lead physician to modify the progression through the rehab protocol.     Week 1-2 Post Op  -Block Extension past 20 degrees (0 deg is straight), Flexion to patients tolerance  -Patient may begin light  "rotator cuff exercises including: IR/ER with theraband,  -Standing flexion/scaption (maintain 20 degrees elbow extension), scapular exercises and postural education  -Light forearm isometrics for wrist flexion and extension  -Active wrist/finger exercises  -Light gripping exercises  Week 3-4 Post Op  -Full elbow extension expected at week 3  -Progression of rotator cuff and scapular strengthening program  -Resisted forearm and wrist strengthening  -Light biceps and triceps strengthening  -Closed Chain shoulder strengthening with DS2 Platform  -Throwers begin scapular posture training ( prone T, W, V exercises, standing scapular posing, etc)  Week 5-6 Post Op  -Progressive resistive exercises including bicep, tricep, deltoid, trap, pectoralis to restore normal strength  -Following week 6 athlete may begin interval throwing/hitting program if strength, ROM, and healing allow. (Begin only after cleared by Dr. Waldron)    Subjective     Pt reports: Elbow is feeling good. Minimal pain. Looking forward to weaning from the brace.   .  He was compliant with home exercise program.  Response to previous treatment: Ongoing  Functional change: Ongoing    Pain: 1/10  Location: right elbow      Objective      Objective Measures updated at progress report unless specified.     Treatment     Ammon received the treatments listed below:      therapeutic exercises to develop strength, endurance, ROM, and flexibility for 15 minutes including:  Open Book x15 ea  Thoracic extension over foam roll x15   Submax towel gripping 20x5"    manual therapy techniques:  were applied for 8 minutes, including:  Brace adjustment/donning.doffing  R elbow AROM within post-op limits   DRU mobs Grd III-IV  PRU mobs Grd II-III    neuromuscular re-education activities to improve: Coordination, Kinesthetic, Sense, Proprioception, Posture, and Motor Conrolt for 37 minutes. The following activities were included:  Prone MT Lvl III re-ed 3x5x10"2#  Prone LT " "Lvl III re-ed 3x5x10" 2#  Ely ER/IR Step out 3x5x10" ea OTB  S/L ER 5# 3x15   8" Lat SLSD 2x12 ea.   SL Airplanes 3x8 ea.     therapeutic activities to improve functional performance for 00  minutes, including:          Patient Education and Home Exercises       Education provided:   - Biomechanics and principles of proximal stability for distal control     Written Home Exercises Provided: yes. Exercises were reviewed and Ammon was able to demonstrate them prior to the end of the session.  Ammon demonstrated good  understanding of the education provided. See EMR under Patient Instructions for exercises provided during therapy sessions    Assessment     Ammon performed treatment as above today and tolerated all interventions well. No c/o ulnar N sxs. Added functional CKC LE dynamic balance and squat tasks to address LE component of kinetic chain for throwers.     Ammon Is progressing well towards his goals.   Pt prognosis is Excellent.     Pt will continue to benefit from skilled outpatient physical therapy to address the deficits listed in the problem list box on initial evaluation, provide pt/family education and to maximize pt's level of independence in the home and community environment.     Pt's spiritual, cultural and educational needs considered and pt agreeable to plan of care and goals.     Anticipated barriers to physical therapy: None    Goals:     Short Term Goals: 2-4 weeks  1. Pt will be compliant with HEP 50% of prescribed amount.   2. The pt to demo improvement in R Elbow ROM equal to uninvolved side   3.  Pt will improve FOTO score to meet or exceed MCID   4. 3-jaw kaden  strength 70% LSI     Long Term Goals: 12 weeks   Pt will be compliant with % of prescribed amount.   Pt will demo 110% LSI strength via HHD for shoulder girdle R, ER:IR 70%, ER:BW: 20%  Pt will progress through UE plyo and interval throwing program   The pt will report full participation in ADLs and IADLs without " restrictions related to R UE.     Plan     Outpatient Physical Therapy 2 times weekly for 12 weeks to include the following interventions: Electrical Stimulation  , Manual Therapy, Moist Heat/ Ice, Neuromuscular Re-ed, Patient Education, Self Care, Therapeutic Activities, Therapeutic Exercise, and Dry Needling .     Atif Nichole, PT , DPT  Board Certified in Sports Physical Therapy

## 2025-04-21 NOTE — PROGRESS NOTES
[facility]   Physical Therapy Treatment Note      Name: Ammon Jones  [facility] Number: [id number]    Therapy Diagnosis:   Encounter Diagnoses   Name Primary?    Impaired motor control Yes    Lesion of right ulnar nerve     Muscle weakness of right upper extremity     Chronic elbow pain, right      Physician: Sarmad Prince DO    Visit Date: 4/14/2025    Physician Orders: PT Eval and Treat   Medical Diagnosis from Referral: G56.21 (ICD-10-CM) - Lesion of right ulnar nerve   Evaluation Date: 4/10/2025  Authorization Period Expiration: 3/27/26  Plan of Care Expiration: 08/01/2025  Progress Note Due: 06/01/25  Visit # / Visits authorized: 1/ 1   FOTO: 1/N     Precautions: Standard     Time In: 0900  Time Out: 0954  Total Billable Time: 54 minutes    DOS: 3/31/25     PROCEDURES PERFORMED:   Right elbow open ulnar nerve decompression with anterior subfascial transposition (CPT 42825)     POSTOPERATIVE PLAN: The postoperative rehab protocol we will be according to the below stated program.  Patient will remain in his postoperative immobilization splint until 4/10.  At that time he will be converted to a hinged elbow brace.  We will start physical therapy after that appointment.  We have discussed a 4 to 6-month timeframe for return to competitive throwing.     The patient will wear a splint for the 1st week following surgery and then will wear a hinged brace for the next 2 weeks. For the first 2 weeks the elbow will not be allowed to straighten past 20 degrees in order to maintain the new position of the ulnar nerve. Full range of motion is expected at 3 weeks post op. For throwers the expected timeline to return to playing light catch is 6 weeks following surgery.  **Residual numbness and tingling and/or weakness may lead physician to modify the progression through the rehab protocol.     Week 1-2 Post Op  -Block Extension past 20 degrees (0 deg is straight), Flexion to patients  "tolerance  -Patient may begin light rotator cuff exercises including: IR/ER with theraband,  -Standing flexion/scaption (maintain 20 degrees elbow extension), scapular exercises and postural education  -Light forearm isometrics for wrist flexion and extension  -Active wrist/finger exercises  -Light gripping exercises  Week 3-4 Post Op  -Full elbow extension expected at week 3  -Progression of rotator cuff and scapular strengthening program  -Resisted forearm and wrist strengthening  -Light biceps and triceps strengthening  -Closed Chain shoulder strengthening with DS2 Platform  -Throwers begin scapular posture training ( prone T, W, V exercises, standing scapular posing, etc)  Week 5-6 Post Op  -Progressive resistive exercises including bicep, tricep, deltoid, trap, pectoralis to restore normal strength  -Following week 6 athlete may begin interval throwing/hitting program if strength, ROM, and healing allow. (Begin only after cleared by Dr. Waldron)    Subjective     Pt reports: Elbow is feeling good. Minimal pain. Looking forward to weaning from the brace.   .  He was compliant with home exercise program.  Response to previous treatment: Ongoing  Functional change: Ongoing    Pain: 1/10  Location: right elbow      Objective      Objective Measures updated at progress report unless specified.     Treatment     Ammon received the treatments listed below:      therapeutic exercises to develop strength, endurance, ROM, and flexibility for 10 minutes including:  Open Book x15 ea  Submax towel gripping 20x10"   Thoracic extension over foam roll x15     manual therapy techniques:  were applied for 15 minutes, including:  Brace adjustment/donning.doffing  R elbow AROM within post-op limits   DRU mobs Grd III-IV  PRU mobs Grd II-III    neuromuscular re-education activities to improve: Coordination, Kinesthetic, Sense, Proprioception, Posture, and Motor Conrolt for 29 minutes. The following activities were included:  Prone " "MT Lvl III re-ed 3x5x10"  Prone LT Lvl III re-ed 3x5x10"  Ely ER/IR Step out 3x5x10" ea OTB  SA wall slide in brace 3x15  S/L ER 5# 3x15     therapeutic activities to improve functional performance for 00  minutes, including:          Patient Education and Home Exercises       Education provided:   - Biomechanics and principles of proximal stability for distal control     Written Home Exercises Provided: yes. Exercises were reviewed and Ammon was able to demonstrate them prior to the end of the session.  Ammon demonstrated good  understanding of the education provided. See EMR under Patient Instructions for exercises provided during therapy sessions    Assessment     Ammon performed treatment as above today and tolerated all interventions well. Progressed periscapular re-ed as above. No c/o ulnar N sxs.     Ammon Is progressing well towards his goals.   Pt prognosis is Excellent.     Pt will continue to benefit from skilled outpatient physical therapy to address the deficits listed in the problem list box on initial evaluation, provide pt/family education and to maximize pt's level of independence in the home and community environment.     Pt's spiritual, cultural and educational needs considered and pt agreeable to plan of care and goals.     Anticipated barriers to physical therapy: None    Goals:     Short Term Goals: 2-4 weeks  1. Pt will be compliant with HEP 50% of prescribed amount.   2. The pt to demo improvement in R Elbow ROM equal to uninvolved side   3.  Pt will improve FOTO score to meet or exceed MCID   4. 3-jaw kaden  strength 70% LSI     Long Term Goals: 12 weeks   Pt will be compliant with % of prescribed amount.   Pt will demo 110% LSI strength via HHD for shoulder girdle R, ER:IR 70%, ER:BW: 20%  Pt will progress through UE plyo and interval throwing program   The pt will report full participation in ADLs and IADLs without restrictions related to R UE.     Plan     Outpatient Physical " Therapy 2 times weekly for 12 weeks to include the following interventions: Electrical Stimulation  , Manual Therapy, Moist Heat/ Ice, Neuromuscular Re-ed, Patient Education, Self Care, Therapeutic Activities, Therapeutic Exercise, and Dry Needling .     Atif Nichole, PT , DPT  Board Certified in Sports Physical Therapy

## 2025-04-21 NOTE — TELEPHONE ENCOUNTER
Called and left vm informing patient they did not need to bee seen today as they were evaluated 4/17/2025.

## 2025-04-24 ENCOUNTER — CLINICAL SUPPORT (OUTPATIENT)
Dept: REHABILITATION | Facility: HOSPITAL | Age: 20
End: 2025-04-24
Payer: COMMERCIAL

## 2025-04-24 DIAGNOSIS — M25.521 CHRONIC ELBOW PAIN, RIGHT: ICD-10-CM

## 2025-04-24 DIAGNOSIS — G89.29 CHRONIC ELBOW PAIN, RIGHT: ICD-10-CM

## 2025-04-24 DIAGNOSIS — Z78.9 IMPAIRED MOTOR CONTROL: ICD-10-CM

## 2025-04-24 DIAGNOSIS — M62.81 MUSCLE WEAKNESS OF RIGHT UPPER EXTREMITY: Primary | ICD-10-CM

## 2025-04-24 PROCEDURE — 97110 THERAPEUTIC EXERCISES: CPT

## 2025-04-24 PROCEDURE — 97112 NEUROMUSCULAR REEDUCATION: CPT

## 2025-04-24 PROCEDURE — 97140 MANUAL THERAPY 1/> REGIONS: CPT

## 2025-04-24 NOTE — PROGRESS NOTES
[facility]   Physical Therapy Treatment Note      Name: Ammon Jones  [facility] Number: [id number]    Therapy Diagnosis:   Encounter Diagnoses   Name Primary?    Muscle weakness of right upper extremity Yes    Chronic elbow pain, right     Impaired motor control      Physician: Sarmad Prince DO    Visit Date: 4/24/2025    Physician Orders: PT Eval and Treat   Medical Diagnosis from Referral: G56.21 (ICD-10-CM) - Lesion of right ulnar nerve   Evaluation Date: 4/10/2025  Authorization Period Expiration: 3/27/26  Plan of Care Expiration: 08/01/2025  Progress Note Due: 06/01/25  Visit # / Visits authorized: 6/ 20   FOTO: 1/N     Precautions: Standard     Time In: 0703  Time Out: 0803  Total Billable Time: 60 minutes    DOS: 3/31/25     PROCEDURES PERFORMED:   Right elbow open ulnar nerve decompression with anterior subfascial transposition (CPT 48356)     POSTOPERATIVE PLAN: The postoperative rehab protocol we will be according to the below stated program.  Patient will remain in his postoperative immobilization splint until 4/10.  At that time he will be converted to a hinged elbow brace.  We will start physical therapy after that appointment.  We have discussed a 4 to 6-month timeframe for return to competitive throwing.     The patient will wear a splint for the 1st week following surgery and then will wear a hinged brace for the next 2 weeks. For the first 2 weeks the elbow will not be allowed to straighten past 20 degrees in order to maintain the new position of the ulnar nerve. Full range of motion is expected at 3 weeks post op. For throwers the expected timeline to return to playing light catch is 6 weeks following surgery.  **Residual numbness and tingling and/or weakness may lead physician to modify the progression through the rehab protocol.     Week 1-2 Post Op  -Block Extension past 20 degrees (0 deg is straight), Flexion to patients tolerance  -Patient may begin light  rotator cuff exercises including: IR/ER with theraband,  -Standing flexion/scaption (maintain 20 degrees elbow extension), scapular exercises and postural education  -Light forearm isometrics for wrist flexion and extension  -Active wrist/finger exercises  -Light gripping exercises  Week 3-4 Post Op  -Full elbow extension expected at week 3  -Progression of rotator cuff and scapular strengthening program  -Resisted forearm and wrist strengthening  -Light biceps and triceps strengthening  -Closed Chain shoulder strengthening with DS2 Platform  -Throwers begin scapular posture training ( prone T, W, V exercises, standing scapular posing, etc)  Week 5-6 Post Op  -Progressive resistive exercises including bicep, tricep, deltoid, trap, pectoralis to restore normal strength  -Following week 6 athlete may begin interval throwing/hitting program if strength, ROM, and healing allow. (Begin only after cleared by Dr. Waldron)    Subjective     Pt reports: Feels like he will have no issue Dc'ing brace  .  He was compliant with home exercise program.  Response to previous treatment: Ongoing  Functional change: Ongoing    Pain: 1/10  Location: right elbow      Objective      Objective Measures updated at progress report unless specified.     Active Range of Motion:   Elbow Left Right   Flexion 145 140   extension +5 HE +5 HE   pronation 90 85   supination 85 80             Hypomobility with UH distraction and medial accessory glide      Treatment     Ammon received the treatments listed below:      therapeutic exercises to develop strength, endurance, ROM, and flexibility for 22 minutes including:  Self UH gapping MWM x15   7# Cable ER 3x12   10# FDS plate carry LOT 4x    manual therapy techniques:  were applied for 8 minutes, including:  R elbow AROM within post-op limits   UH Distraction and medial glide    neuromuscular re-education activities to improve: Coordination, Kinesthetic, Sense, Proprioception, Posture, and Motor  "Conrolt for 30 minutes. The following activities were included:  90-90 ER 3# 3x15  Prone MT Lvl III re-ed 3x5x10"3#  Prone LT Lvl III re-ed 3x5x10" 3#  8" Lat SLSD 2x12 ea.     Not performed:   SL Airplanes 3x8 ea.     therapeutic activities to improve functional performance for 00  minutes, including:          Patient Education and Home Exercises       Education provided:   - Biomechanics and principles of proximal stability for distal control     Written Home Exercises Provided: yes. Exercises were reviewed and Ammon was able to demonstrate them prior to the end of the session.  Ammon demonstrated good  understanding of the education provided. See EMR under Patient Instructions for exercises provided during therapy sessions    Assessment     Ammon performed treatment as above today and tolerated all interventions well. No c/o ulnar N sxs. Progressed RC strengthening and introduced light isometric plate carries with no c/o pain just mild fatigue in musculature.     Physical therapy technician utilized during parts of treatment while I maintained line of sight supervision at all times.       Ammon Is progressing well towards his goals.   Pt prognosis is Excellent.     Pt will continue to benefit from skilled outpatient physical therapy to address the deficits listed in the problem list box on initial evaluation, provide pt/family education and to maximize pt's level of independence in the home and community environment.     Pt's spiritual, cultural and educational needs considered and pt agreeable to plan of care and goals.     Anticipated barriers to physical therapy: None    Goals:     Short Term Goals: 2-4 weeks  1. Pt will be compliant with HEP 50% of prescribed amount.   2. The pt to demo improvement in R Elbow ROM equal to uninvolved side   3.  Pt will improve FOTO score to meet or exceed MCID   4. 3-jaw kaden  strength 70% LSI     Long Term Goals: 12 weeks   Pt will be compliant with % of " prescribed amount.   Pt will demo 110% LSI strength via HHD for shoulder girdle R, ER:IR 70%, ER:BW: 20%  Pt will progress through UE plyo and interval throwing program   The pt will report full participation in ADLs and IADLs without restrictions related to R UE.     Plan     Outpatient Physical Therapy 2 times weekly for 12 weeks to include the following interventions: Electrical Stimulation  , Manual Therapy, Moist Heat/ Ice, Neuromuscular Re-ed, Patient Education, Self Care, Therapeutic Activities, Therapeutic Exercise, and Dry Needling .     Atif Nichole, PT , DPT  Board Certified in Sports Physical Therapy

## 2025-04-28 ENCOUNTER — ATHLETIC TRAINING SESSION (OUTPATIENT)
Dept: SPORTS MEDICINE | Facility: CLINIC | Age: 20
End: 2025-04-28
Payer: COMMERCIAL

## 2025-04-28 ENCOUNTER — CLINICAL SUPPORT (OUTPATIENT)
Dept: REHABILITATION | Facility: HOSPITAL | Age: 20
End: 2025-04-28
Payer: COMMERCIAL

## 2025-04-28 DIAGNOSIS — M25.521 RIGHT ELBOW PAIN: Primary | ICD-10-CM

## 2025-04-28 DIAGNOSIS — Z78.9 IMPAIRED MOTOR CONTROL: ICD-10-CM

## 2025-04-28 DIAGNOSIS — G89.29 CHRONIC ELBOW PAIN, RIGHT: ICD-10-CM

## 2025-04-28 DIAGNOSIS — M25.521 CHRONIC ELBOW PAIN, RIGHT: ICD-10-CM

## 2025-04-28 DIAGNOSIS — M62.81 MUSCLE WEAKNESS OF RIGHT UPPER EXTREMITY: Primary | ICD-10-CM

## 2025-04-28 PROCEDURE — 97110 THERAPEUTIC EXERCISES: CPT

## 2025-04-28 PROCEDURE — 97140 MANUAL THERAPY 1/> REGIONS: CPT

## 2025-04-28 PROCEDURE — 97112 NEUROMUSCULAR REEDUCATION: CPT

## 2025-04-28 NOTE — PROGRESS NOTES
Reason for Encounter Follow-Up    Subjective:       Chief Complaint: Ammon Jones is a 20 y.o. male student at [employer]) who had concerns including Injury of the Right Elbow.    Ammon is a  for [employer]. He had surgery for ulnar nerve subluxation and we are doing rehab in the [facility] in addition to his formal physical therapy.     Handedness: right-handed  Sport played: baseball      Level: college      Position:pitcher      Injury        ROS              Objective:       General: Ammon is well-developed, well-nourished, appears stated age, in no acute distress, alert and oriented to time, place and person.     AT Session          Assessment:       Status: O - Out    Date Seen: 04/20/2025-04/26/2025    Date of Injury: 03/14/2025    Date Out: 03/14/2025    Date Cleared: NA        Treatment/Rehab/Maintenance:   Ammon completed therapeutic exercises to develop strength and ROM:    Date: 04/25/2025    Exercises  Sets x Reps  Weight   Wrist stretches 2 x 30s    Putty squeezes 1 min  Green putty    Wrist flexion  3 x 10  3 lbs    Wrist extension  3 x 10  3 lbs    Shoulder IR neutral  3 x 10  Red theraband    Shoulder ER neutral  3 x 10  Red theraband    Wall ball ABC 2 x ABC                        Light flush massage  Gentle AT administered shoulder and elbow mobility       Ammon completed therapeutic exercises to develop strength and ROM:    Date: 04/23/2025    Exercises  Sets x Reps  Weight   Prone I, Y, T  3 x 8  1 lbs    Face Pulls  3 x 8  Yellow theraband   Snowangels  3 x 8  Yellow theraband    Pronation/ supination  3 x 8  2 lbs                                       Light flush massage 10 minutes  Gentle shoulder and elbow ROM (AT provided)      Ammon completed therapeutic exercises to develop strength and ROM:    Date: 04/22/2025    Exercises  Sets x Reps  Weight   Wrist stretches 2 x 30s    Putty squeezes 1 min  Green putty    Wrist flexion  3 x 10  3 lbs    Wrist extension  3 x  10  3 lbs    Shoulder IR neutral  3 x 10  Red theraband    Shoulder ER neutral  3 x 10  Red theraband    Wall ball ABC 2 x ABC                            Plan:       1. Come in to [facility] for rehab on days he does not go to PT. Progress appropriately.   2. Physician Referral: yes  3. ED Referral:no  4. Parent/Guardian Notified: No  5. All questions were answered, ath. will contact me for questions or concerns in  the interim.  6.         Eligible to use School Insurance: Yes

## 2025-04-28 NOTE — PROGRESS NOTES
Reason for Encounter Follow-Up    Subjective:       Chief Complaint: Ammon Jones is a 20 y.o. male student at [employer]) who had concerns including Injury of the Right Elbow.    Ammon is a  for [employer]. He had surgery for ulnar nerve subluxation and we are doing rehab in the [facility] in addition to his formal physical therapy.     Handedness: right-handed  Sport played: baseball      Level: college      Position:pitcher      Injury        ROS              Objective:       General: Ammon is well-developed, well-nourished, appears stated age, in no acute distress, alert and oriented to time, place and person.     AT Session          Assessment:       Status: O - Out    Date Seen: 04/13/2025-04/19/2025    Date of Injury: 03/14/2025    Date Out: 03/14/2025    Date Cleared: NA        Treatment/Rehab/Maintenance:   Ammon completed therapeutic exercises to develop strength and ROM:    Date: 04/19/2025    Exercises  Sets x Reps  Weight   Prone I, Y, T  3 x 8  1 lbs    Face Pulls  3 x 8  Yellow theraband   Snowangels  3 x 8  Yellow theraband    Pronation/ supination  3 x 8  2 lbs                                       Light flush massage 10 minutes  Gentle shoulder, elbow, and wrist ROM (AT provided)    Ammon completed therapeutic exercises to develop strength and ROM:    Date: 04/18/2025    Exercises  Sets x Reps  Weight   Wrist stretches 2 x 30s    Putty squeezes 1 min  Red putty    Wrist flexion  2 x 10  2 lbs    Wrist extension  2 x 10  2 lbs    Shoulder IR neutral  3 x 10  Yellow theraband    Shoulder ER neutral  3 x 10  Yellow theraband    Wall ball ABC 2 x ABC                        Light flush massage  Gentle AT administered shoulder and elbow mobility   Modality: GameReady  Date 04/18/2025, R elbow, and Duration 15 min          Ammon completed therapeutic exercises to develop strength and ROM:    Date: 04/17/2025    Exercises  Sets x Reps  Weight   Prone I, Y, T  3 x 8  1 lbs     Face Pulls  3 x 8  Yellow theraband   Snowangels  3 x 8  Yellow theraband    Pronation/ supination  3 x 8  2 lbs                                       Light flush massage 10 minutes  Gentle shoulder and elbow ROM (AT provided)      Ammon completed therapeutic exercises to develop strength and ROM:    Date: 04/16/2025    Exercises  Sets x Reps  Weight   Wrist stretches 2 x 30s    Putty squeezes 1 min  Red putty    Wrist flexion  2 x 10  2 lbs    Wrist extension  2 x 10  2 lbs    Shoulder IR neutral  3 x 10  Yellow theraband    Shoulder ER neutral  3 x 10  Yellow theraband    Wall ball ABC 2 x ABC                            Ammon completed therapeutic exercises to develop strength and ROM:    Date: 04/15/2025    Exercises  Sets x Reps  Weight   Wrist stretches 2 x 30s    Putty squeezes 1 min  Red putty    Wrist flexion  2 x 10  2 lbs    Wrist extension  2 x 10  2 lbs    Shoulder IR neutral  3 x 10  Yellow theraband    Shoulder ER neutral  3 x 10  Yellow theraband    Wall ball ABC 2 x ABC                                Plan:       1. Come in to [facility] for rehab on days he does not go to PT. Progress appropriately.   2. Physician Referral: yes  3. ED Referral:no  4. Parent/Guardian Notified: No  5. All questions were answered, ath. will contact me for questions or concerns in  the interim.  6.         Eligible to use School Insurance: Yes

## 2025-04-28 NOTE — PROGRESS NOTES
[facility]   Physical Therapy Treatment Note      Name: Ammon Jones  [facility] Number: [id number]    Therapy Diagnosis:   Encounter Diagnoses   Name Primary?    Muscle weakness of right upper extremity Yes    Chronic elbow pain, right     Impaired motor control      Physician: Sarmad Prince DO    Visit Date: 4/28/2025    Physician Orders: PT Eval and Treat   Medical Diagnosis from Referral: G56.21 (ICD-10-CM) - Lesion of right ulnar nerve   Evaluation Date: 4/10/2025  Authorization Period Expiration: 3/27/26  Plan of Care Expiration: 08/01/2025  Progress Note Due: 06/01/25  Visit # / Visits authorized: 7/ 20   FOTO: 1/N     Precautions: Standard     Time In: 0808  Time Out: 0902  Total Billable Time: 54 minutes    DOS: 3/31/25     PROCEDURES PERFORMED:   Right elbow open ulnar nerve decompression with anterior subfascial transposition (CPT 15951)     POSTOPERATIVE PLAN: The postoperative rehab protocol we will be according to the below stated program.  Patient will remain in his postoperative immobilization splint until 4/10.  At that time he will be converted to a hinged elbow brace.  We will start physical therapy after that appointment.  We have discussed a 4 to 6-month timeframe for return to competitive throwing.     The patient will wear a splint for the 1st week following surgery and then will wear a hinged brace for the next 2 weeks. For the first 2 weeks the elbow will not be allowed to straighten past 20 degrees in order to maintain the new position of the ulnar nerve. Full range of motion is expected at 3 weeks post op. For throwers the expected timeline to return to playing light catch is 6 weeks following surgery.  **Residual numbness and tingling and/or weakness may lead physician to modify the progression through the rehab protocol.     Week 1-2 Post Op  -Block Extension past 20 degrees (0 deg is straight), Flexion to patients tolerance  -Patient may begin light  rotator cuff exercises including: IR/ER with theraband,  -Standing flexion/scaption (maintain 20 degrees elbow extension), scapular exercises and postural education  -Light forearm isometrics for wrist flexion and extension  -Active wrist/finger exercises  -Light gripping exercises  Week 3-4 Post Op  -Full elbow extension expected at week 3  -Progression of rotator cuff and scapular strengthening program  -Resisted forearm and wrist strengthening  -Light biceps and triceps strengthening  -Closed Chain shoulder strengthening with DS2 Platform  -Throwers begin scapular posture training ( prone T, W, V exercises, standing scapular posing, etc)  Week 5-6 Post Op  -Progressive resistive exercises including bicep, tricep, deltoid, trap, pectoralis to restore normal strength  -Following week 6 athlete may begin interval throwing/hitting program if strength, ROM, and healing allow. (Begin only after cleared by Dr. Waldron)    Subjective     Pt reports: Doing well without the brace. Elbow feels a little stiff.   .  He was compliant with home exercise program.  Response to previous treatment: Ongoing  Functional change: Ongoing    Pain: 1/10  Location: right elbow      Objective      Objective Measures updated at progress report unless specified.     Active Range of Motion:   Elbow Left Right   Flexion 145 140   extension +5 HE +5 HE   pronation 90 85   supination 85 80             Hypomobility of RH distraction, PRUJ, and lateral RH gapping      Treatment     Ammon received the treatments listed below:      therapeutic exercises to develop strength, endurance, ROM, and flexibility for 21 minutes including:  Eccentric biceps curls over bolster 3x12 5#  Skull crushers supinated 3x12   7# Cable ER 3x12     Not performed:  10# FDS plate carry LOT 4x    manual therapy techniques:  were applied for 8 minutes, including:  R elbow AROM within post-op limits   RH gapping/distraction  Lateral gapping Mobs  Pruj Mobs Grd  "III-IV    neuromuscular re-education activities to improve: Coordination, Kinesthetic, Sense, Proprioception, Posture, and Motor Conrolt for 25 minutes. The following activities were included:  90-90 ER 3# 3x15  Prone MT Lvl III re-ed 3x5x10"3#  Prone LT Lvl III re-ed 3x5x10" 3#    Not performed:   SL Airplanes 3x8 ea.   8" Lat SLSD 2x12 ea.     therapeutic activities to improve functional performance for 00  minutes, including:          Patient Education and Home Exercises       Education provided:   - Biomechanics and principles of proximal stability for distal control     Written Home Exercises Provided: yes. Exercises were reviewed and Ammon was able to demonstrate them prior to the end of the session.  Ammon demonstrated good  understanding of the education provided. See EMR under Patient Instructions for exercises provided during therapy sessions    Assessment     Ammon performed treatment as above today and tolerated all interventions well. Presented with hypomobility of RH and PRUJ with stiffness and slight limitation of extension at terminal ROM that resolved with manual This was followed with paired interventions of eccentric biceps lengthening and active triceps extension to utilize newly acquired mobility which was well tolerated.     Physical therapy technician utilized during parts of treatment while I maintained line of sight supervision at all times.       Ammon Is progressing well towards his goals.   Pt prognosis is Excellent.     Pt will continue to benefit from skilled outpatient physical therapy to address the deficits listed in the problem list box on initial evaluation, provide pt/family education and to maximize pt's level of independence in the home and community environment.     Pt's spiritual, cultural and educational needs considered and pt agreeable to plan of care and goals.     Anticipated barriers to physical therapy: None    Goals:     Short Term Goals: 2-4 weeks  1. Pt will be " compliant with HEP 50% of prescribed amount.   2. The pt to demo improvement in R Elbow ROM equal to uninvolved side   3.  Pt will improve FOTO score to meet or exceed MCID   4. 3-jaw kaden  strength 70% LSI     Long Term Goals: 12 weeks   Pt will be compliant with % of prescribed amount.   Pt will demo 110% LSI strength via HHD for shoulder girdle R, ER:IR 70%, ER:BW: 20%  Pt will progress through UE plyo and interval throwing program   The pt will report full participation in ADLs and IADLs without restrictions related to R UE.     Plan     Outpatient Physical Therapy 2 times weekly for 12 weeks to include the following interventions: Electrical Stimulation  , Manual Therapy, Moist Heat/ Ice, Neuromuscular Re-ed, Patient Education, Self Care, Therapeutic Activities, Therapeutic Exercise, and Dry Needling .     Atif Nichole, PT , DPT  Board Certified in Sports Physical Therapy

## 2025-05-01 ENCOUNTER — CLINICAL SUPPORT (OUTPATIENT)
Dept: REHABILITATION | Facility: HOSPITAL | Age: 20
End: 2025-05-01
Payer: COMMERCIAL

## 2025-05-01 DIAGNOSIS — G89.29 CHRONIC ELBOW PAIN, RIGHT: ICD-10-CM

## 2025-05-01 DIAGNOSIS — Z78.9 IMPAIRED MOTOR CONTROL: ICD-10-CM

## 2025-05-01 DIAGNOSIS — M25.521 CHRONIC ELBOW PAIN, RIGHT: ICD-10-CM

## 2025-05-01 DIAGNOSIS — M62.81 MUSCLE WEAKNESS OF RIGHT UPPER EXTREMITY: Primary | ICD-10-CM

## 2025-05-01 PROCEDURE — 97112 NEUROMUSCULAR REEDUCATION: CPT

## 2025-05-01 PROCEDURE — 97110 THERAPEUTIC EXERCISES: CPT

## 2025-05-01 NOTE — PROGRESS NOTES
[facility]   Physical Therapy Treatment Note      Name: Ammon Jones  [facility] Number: [id number]    Therapy Diagnosis:   Encounter Diagnoses   Name Primary?    Muscle weakness of right upper extremity Yes    Chronic elbow pain, right     Impaired motor control      Physician: Sarmad Prince DO    Visit Date: 5/1/2025    Physician Orders: PT Eval and Treat   Medical Diagnosis from Referral: G56.21 (ICD-10-CM) - Lesion of right ulnar nerve   Evaluation Date: 4/10/2025  Authorization Period Expiration: 3/27/26  Plan of Care Expiration: 08/01/2025  Progress Note Due: 06/01/25  Visit # / Visits authorized: 8/ 20   FOTO: 1/N     Precautions: Standard     Time In: 0800  Time Out: 0906  Total Billable Time: 59 minutes    DOS: 3/31/25     PROCEDURES PERFORMED:   Right elbow open ulnar nerve decompression with anterior subfascial transposition (CPT 57088)     POSTOPERATIVE PLAN: The postoperative rehab protocol we will be according to the below stated program.  Patient will remain in his postoperative immobilization splint until 4/10.  At that time he will be converted to a hinged elbow brace.  We will start physical therapy after that appointment.  We have discussed a 4 to 6-month timeframe for return to competitive throwing.     The patient will wear a splint for the 1st week following surgery and then will wear a hinged brace for the next 2 weeks. For the first 2 weeks the elbow will not be allowed to straighten past 20 degrees in order to maintain the new position of the ulnar nerve. Full range of motion is expected at 3 weeks post op. For throwers the expected timeline to return to playing light catch is 6 weeks following surgery.  **Residual numbness and tingling and/or weakness may lead physician to modify the progression through the rehab protocol.     Week 1-2 Post Op  -Block Extension past 20 degrees (0 deg is straight), Flexion to patients tolerance  -Patient may begin light  rotator cuff exercises including: IR/ER with theraband,  -Standing flexion/scaption (maintain 20 degrees elbow extension), scapular exercises and postural education  -Light forearm isometrics for wrist flexion and extension  -Active wrist/finger exercises  -Light gripping exercises  Week 3-4 Post Op  -Full elbow extension expected at week 3  -Progression of rotator cuff and scapular strengthening program  -Resisted forearm and wrist strengthening  -Light biceps and triceps strengthening  -Closed Chain shoulder strengthening with DS2 Platform  -Throwers begin scapular posture training ( prone T, W, V exercises, standing scapular posing, etc)  Week 5-6 Post Op  -Progressive resistive exercises including bicep, tricep, deltoid, trap, pectoralis to restore normal strength  -Following week 6 athlete may begin interval throwing/hitting program if strength, ROM, and healing allow. (Begin only after cleared by Dr. Waldron)    Subjective     Pt reports: Elbow feels good. No stiffness  .  He was compliant with home exercise program.  Response to previous treatment: Ongoing  Functional change: Ongoing    Pain: 1/10  Location: right elbow      Objective      Objective Measures updated at progress report unless specified.     Active Range of Motion:   Elbow Left Right   Flexion 145 145   extension +5 HE +5 HE   pronation 90 90   supination 85 85         Treatment     Ammon received the treatments listed below:      therapeutic exercises to develop strength, endurance, ROM, and flexibility for 24 minutes including:  UBE completed for 3/3 min forward & backward to increase ROM, endurance and decrease pain to improve tolerance to ADLs and age related activities.     1080 ER 3x12 3 KG/ 5 Kg  1080 Tricep ext to lat pull 5 Kg / 8 Kg 3x12     Not performed:  10# FDS plate carry LOT 4x  Eccentric biceps curls over bolster 3x12 5#  Skull crushers supinated 3x12     manual therapy techniques:  were applied for 00 minutes,  "including:    neuromuscular re-education activities to improve: Coordination, Kinesthetic, Sense, Proprioception, Posture, and Motor Conrolt for 35 minutes. The following activities were included:  90-90 ER 7# 3x15  Prone MT Lvl III re-ed 3x5x10"3#  Prone LT Lvl III re-ed 3x5x10" 3#  8" Lat SLSD 2x12 ea.   SL RDL w/10# cable Row 3x10 ea.     therapeutic activities to improve functional performance for 00  minutes, including:          Patient Education and Home Exercises       Education provided:   - Biomechanics and principles of proximal stability for distal control     Written Home Exercises Provided: yes. Exercises were reviewed and Ammon was able to demonstrate them prior to the end of the session.  Ammon demonstrated good  understanding of the education provided. See EMR under Patient Instructions for exercises provided during therapy sessions    Assessment     Ammon performed treatment as above today and tolerated all interventions well. Good carry over of improvements in elbow ROM and joint mobility. Continued with RC and periscapular progressions which are well tolerated.       Physical therapy technician utilized during parts of treatment while I maintained line of sight supervision at all times.       Ammon Is progressing well towards his goals.   Pt prognosis is Excellent.     Pt will continue to benefit from skilled outpatient physical therapy to address the deficits listed in the problem list box on initial evaluation, provide pt/family education and to maximize pt's level of independence in the home and community environment.     Pt's spiritual, cultural and educational needs considered and pt agreeable to plan of care and goals.     Anticipated barriers to physical therapy: None    Goals:     Short Term Goals: 2-4 weeks  1. Pt will be compliant with HEP 50% of prescribed amount.   2. The pt to demo improvement in R Elbow ROM equal to uninvolved side   3.  Pt will improve FOTO score to meet or exceed " MCID   4. 3-jaw kaden  strength 70% LSI     Long Term Goals: 12 weeks   Pt will be compliant with % of prescribed amount.   Pt will demo 110% LSI strength via HHD for shoulder girdle R, ER:IR 70%, ER:BW: 20%  Pt will progress through UE plyo and interval throwing program   The pt will report full participation in ADLs and IADLs without restrictions related to R UE.     Plan     Outpatient Physical Therapy 2 times weekly for 12 weeks to include the following interventions: Electrical Stimulation  , Manual Therapy, Moist Heat/ Ice, Neuromuscular Re-ed, Patient Education, Self Care, Therapeutic Activities, Therapeutic Exercise, and Dry Needling .     Atif Nichole, PT , DPT  Board Certified in Sports Physical Therapy

## 2025-05-05 NOTE — PROGRESS NOTES
S:Ammon Jones presents for post-operative evaluation.     DATE OF PROCEDURE: 3/31/2025   PROCEDURES PERFORMED:   Right elbow open ulnar nerve decompression with anterior subfascial transposition (CPT 15311)    Ammon Jones reports to be doing well 6 weeks status post the above mentioned procedure. Continues PT at the [place] location with [facility].  Doing well.  He has no complaints.  No numbness or tingling.  No pain.  Anxious to begin throwing again.  Accompanied by his father today.    O:  Exam of the right elbow shows a well-healed incision.  Minimal residual swelling if any.  Intact sensation to light touch over the medial elbow.  Negative Tinel sign.  Stable anterior transposition of the ulnar nerve.  Full elbow and forearm range of motion.  Good protective strength.  No pain with resisted wrist flexion.    A/P:  Clinically the patient looks quite good.  I have discussed the case with his physical therapist.  We will work on getting his shoulder and upper extremity a bit stronger over the next few weeks but we will begin a progressive throwing program thereafter.  The throwing program will take place over a 2 month period of time roughly.  As before we have discussed a target return to competition timeline of anywhere between 4-6 months postop.  All questions answered.  They are in agreement with the plan.  I will see him back in 6 weeks.

## 2025-05-06 ENCOUNTER — CLINICAL SUPPORT (OUTPATIENT)
Dept: REHABILITATION | Facility: HOSPITAL | Age: 20
End: 2025-05-06
Payer: COMMERCIAL

## 2025-05-06 DIAGNOSIS — G89.29 CHRONIC ELBOW PAIN, RIGHT: ICD-10-CM

## 2025-05-06 DIAGNOSIS — M25.521 CHRONIC ELBOW PAIN, RIGHT: ICD-10-CM

## 2025-05-06 DIAGNOSIS — Z78.9 IMPAIRED MOTOR CONTROL: ICD-10-CM

## 2025-05-06 DIAGNOSIS — M62.81 MUSCLE WEAKNESS OF RIGHT UPPER EXTREMITY: Primary | ICD-10-CM

## 2025-05-06 PROCEDURE — 97110 THERAPEUTIC EXERCISES: CPT

## 2025-05-06 PROCEDURE — 97112 NEUROMUSCULAR REEDUCATION: CPT

## 2025-05-06 PROCEDURE — 97530 THERAPEUTIC ACTIVITIES: CPT

## 2025-05-06 NOTE — PROGRESS NOTES
[facility]   Physical Therapy Treatment Note      Name: Ammon Jones  [facility] Number: [id number]    Therapy Diagnosis:   Encounter Diagnoses   Name Primary?    Muscle weakness of right upper extremity Yes    Chronic elbow pain, right     Impaired motor control      Physician: Sarmad Prince DO    Visit Date: 5/6/2025    Physician Orders: PT Eval and Treat   Medical Diagnosis from Referral: G56.21 (ICD-10-CM) - Lesion of right ulnar nerve   Evaluation Date: 4/10/2025  Authorization Period Expiration: 3/27/26  Plan of Care Expiration: 08/01/2025  Progress Note Due: 06/01/25  Visit # / Visits authorized: 9 / 20   FOTO: 1/N     Precautions: Standard     Time In: 0800  Time Out: 0900  Total Billable Time: 54 minutes    DOS: 3/31/25     PROCEDURES PERFORMED:   Right elbow open ulnar nerve decompression with anterior subfascial transposition (CPT 98531)     POSTOPERATIVE PLAN: The postoperative rehab protocol we will be according to the below stated program.  Patient will remain in his postoperative immobilization splint until 4/10.  At that time he will be converted to a hinged elbow brace.  We will start physical therapy after that appointment.  We have discussed a 4 to 6-month timeframe for return to competitive throwing.     The patient will wear a splint for the 1st week following surgery and then will wear a hinged brace for the next 2 weeks. For the first 2 weeks the elbow will not be allowed to straighten past 20 degrees in order to maintain the new position of the ulnar nerve. Full range of motion is expected at 3 weeks post op. For throwers the expected timeline to return to playing light catch is 6 weeks following surgery.  **Residual numbness and tingling and/or weakness may lead physician to modify the progression through the rehab protocol.     Week 1-2 Post Op  -Block Extension past 20 degrees (0 deg is straight), Flexion to patients tolerance  -Patient may begin light  rotator cuff exercises including: IR/ER with theraband,  -Standing flexion/scaption (maintain 20 degrees elbow extension), scapular exercises and postural education  -Light forearm isometrics for wrist flexion and extension  -Active wrist/finger exercises  -Light gripping exercises  Week 3-4 Post Op  -Full elbow extension expected at week 3  -Progression of rotator cuff and scapular strengthening program  -Resisted forearm and wrist strengthening  -Light biceps and triceps strengthening  -Closed Chain shoulder strengthening with DS2 Platform  -Throwers begin scapular posture training ( prone T, W, V exercises, standing scapular posing, etc)  Week 5-6 Post Op  -Progressive resistive exercises including bicep, tricep, deltoid, trap, pectoralis to restore normal strength  -Following week 6 athlete may begin interval throwing/hitting program if strength, ROM, and healing allow. (Begin only after cleared by Dr. Waldron)    Subjective     Pt reports: Elbow feels good. No stiffness  .  He was compliant with home exercise program.  Response to previous treatment: Ongoing  Functional change: Ongoing    Pain: 1/10  Location: right elbow      Objective      Objective Measures updated at progress report unless specified.     Active Range of Motion:   Elbow Left Right   Flexion 145 145   extension +5 HE +5 HE   pronation 90 90   supination 85 85         Treatment     Ammon received the treatments listed below:      therapeutic exercises to develop strength, endurance, ROM, and flexibility for 17 minutes including:  Prisoner's warm up x2 rounds 2#  1080 ER 3x12 3 KG/ 5 Kg    Not performed:  10# FDS plate carry LOT 4x  Eccentric biceps curls over bolster 3x12 5#  Skull crushers supinated 3x12   1080 Tricep ext to lat pull 5 Kg / 8 Kg 3x12     manual therapy techniques:  were applied for 00 minutes, including:    neuromuscular re-education activities to improve: Coordination, Kinesthetic, Sense, Proprioception, Posture, and Motor  "Conrolt for 28 minutes. The following activities were included:  1080 90-90 ER 5 KG # 3x15  1080 D2 3 Kg/4 Kg 3x12   1080 SA press 3x12 5Kg / 8Kg      Not performed:   Prone MT Lvl III re-ed 3x5x10"3#  Prone LT Lvl III re-ed 3x5x10" 3#  8" Lat SLSD 2x12 ea.   SL RDL w/10# cable Row 3x10 ea.     therapeutic activities to improve functional performance for 9  minutes, includin# Chest pass plyos 5x10         Patient Education and Home Exercises       Education provided:   - Biomechanics and principles of proximal stability for distal control     Written Home Exercises Provided: yes. Exercises were reviewed and Ammon was able to demonstrate them prior to the end of the session.  Ammon demonstrated good  understanding of the education provided. See EMR under Patient Instructions for exercises provided during therapy sessions    Assessment     Ammon performed treatment as above today and tolerated all interventions well. Continued with eccentric emphasis on periscap and shoulder girdle training to attenuate forces of throwing at the shoulder. Low level plyometrics intro'd today and were well tolerated. Will perform strength testing next tx prior to his post-op follow up and round with his surgeon.       Physical therapy technician utilized during parts of treatment while I maintained line of sight supervision at all times.       Ammon Is progressing well towards his goals.   Pt prognosis is Excellent.     Pt will continue to benefit from skilled outpatient physical therapy to address the deficits listed in the problem list box on initial evaluation, provide pt/family education and to maximize pt's level of independence in the home and community environment.     Pt's spiritual, cultural and educational needs considered and pt agreeable to plan of care and goals.     Anticipated barriers to physical therapy: None    Goals:     Short Term Goals: 2-4 weeks  1. Pt will be compliant with HEP 50% of prescribed amount.   2. " The pt to demo improvement in R Elbow ROM equal to uninvolved side   3.  Pt will improve FOTO score to meet or exceed MCID   4. 3-jaw kaden  strength 70% LSI     Long Term Goals: 12 weeks   Pt will be compliant with % of prescribed amount.   Pt will demo 110% LSI strength via HHD for shoulder girdle R, ER:IR 70%, ER:BW: 20%  Pt will progress through UE plyo and interval throwing program   The pt will report full participation in ADLs and IADLs without restrictions related to R UE.     Plan     Outpatient Physical Therapy 2 times weekly for 12 weeks to include the following interventions: Electrical Stimulation  , Manual Therapy, Moist Heat/ Ice, Neuromuscular Re-ed, Patient Education, Self Care, Therapeutic Activities, Therapeutic Exercise, and Dry Needling .     Atif Nichole, PT , DPT  Board Certified in Sports Physical Therapy

## 2025-05-08 ENCOUNTER — CLINICAL SUPPORT (OUTPATIENT)
Dept: REHABILITATION | Facility: HOSPITAL | Age: 20
End: 2025-05-08
Payer: COMMERCIAL

## 2025-05-08 DIAGNOSIS — M62.81 MUSCLE WEAKNESS OF RIGHT UPPER EXTREMITY: Primary | ICD-10-CM

## 2025-05-08 DIAGNOSIS — Z78.9 IMPAIRED MOTOR CONTROL: ICD-10-CM

## 2025-05-08 DIAGNOSIS — G89.29 CHRONIC ELBOW PAIN, RIGHT: ICD-10-CM

## 2025-05-08 DIAGNOSIS — M25.521 CHRONIC ELBOW PAIN, RIGHT: ICD-10-CM

## 2025-05-08 PROCEDURE — 97530 THERAPEUTIC ACTIVITIES: CPT

## 2025-05-08 PROCEDURE — 97112 NEUROMUSCULAR REEDUCATION: CPT

## 2025-05-08 PROCEDURE — 97110 THERAPEUTIC EXERCISES: CPT

## 2025-05-08 NOTE — PROGRESS NOTES
[facility]   Physical Therapy Treatment Note      Name: Ammon Jones  [facility] Number: [id number]    Therapy Diagnosis:   Encounter Diagnoses   Name Primary?    Muscle weakness of right upper extremity Yes    Chronic elbow pain, right     Impaired motor control      Physician: Sarmad Prince DO    Visit Date: 5/8/2025    Physician Orders: PT Eval and Treat   Medical Diagnosis from Referral: G56.21 (ICD-10-CM) - Lesion of right ulnar nerve   Evaluation Date: 4/10/2025  Authorization Period Expiration: 3/27/26  Plan of Care Expiration: 08/01/2025  Progress Note Due: 06/01/25  Visit # / Visits authorized: 10 / 20   FOTO: 1/N     Precautions: Standard     Time In: 0800  Time Out: 0900  Total Billable Time: 54 minutes    DOS: 3/31/25     PROCEDURES PERFORMED:   Right elbow open ulnar nerve decompression with anterior subfascial transposition (CPT 91934)     POSTOPERATIVE PLAN: The postoperative rehab protocol we will be according to the below stated program.  Patient will remain in his postoperative immobilization splint until 4/10.  At that time he will be converted to a hinged elbow brace.  We will start physical therapy after that appointment.  We have discussed a 4 to 6-month timeframe for return to competitive throwing.     The patient will wear a splint for the 1st week following surgery and then will wear a hinged brace for the next 2 weeks. For the first 2 weeks the elbow will not be allowed to straighten past 20 degrees in order to maintain the new position of the ulnar nerve. Full range of motion is expected at 3 weeks post op. For throwers the expected timeline to return to playing light catch is 6 weeks following surgery.  **Residual numbness and tingling and/or weakness may lead physician to modify the progression through the rehab protocol.     Week 1-2 Post Op  -Block Extension past 20 degrees (0 deg is straight), Flexion to patients tolerance  -Patient may begin light  rotator cuff exercises including: IR/ER with theraband,  -Standing flexion/scaption (maintain 20 degrees elbow extension), scapular exercises and postural education  -Light forearm isometrics for wrist flexion and extension  -Active wrist/finger exercises  -Light gripping exercises  Week 3-4 Post Op  -Full elbow extension expected at week 3  -Progression of rotator cuff and scapular strengthening program  -Resisted forearm and wrist strengthening  -Light biceps and triceps strengthening  -Closed Chain shoulder strengthening with DS2 Platform  -Throwers begin scapular posture training ( prone T, W, V exercises, standing scapular posing, etc)  Week 5-6 Post Op  -Progressive resistive exercises including bicep, tricep, deltoid, trap, pectoralis to restore normal strength  -Following week 6 athlete may begin interval throwing/hitting program if strength, ROM, and healing allow. (Begin only after cleared by Dr. Waldron)    Subjective     Pt reports: Elbow feels good. No stiffness  .  He was compliant with home exercise program.  Response to previous treatment: Ongoing  Functional change: Ongoing    Pain: 1/10  Location: right elbow      Objective      Objective Measures updated at progress report unless specified.     Active Range of Motion:   Elbow Left Right   Flexion 145 145   extension +5 HE +5 HE   pronation 90 90   supination 85 85         Treatment     Ammon received the treatments listed below:      therapeutic exercises to develop strength, endurance, ROM, and flexibility for 8 minutes including:  Prisoner's warm up x2 rounds 2#      Not performed:  10# FDS plate carry LOT 4x  Eccentric biceps curls over bolster 3x12 5#  Skull crushers supinated 3x12   1080 Tricep ext to lat pull 5 Kg / 8 Kg 3x12   1080 ER 3x12 3 KG/ 5 Kg    manual therapy techniques:  were applied for 00 minutes, including:    neuromuscular re-education activities to improve: Coordination, Kinesthetic, Sense, Proprioception, Posture, and Motor  "Conrolt for 37 minutes. The following activities were included:    BFR completed to improve strength/hypertrophy of Shoulder girdle of R LE at 80% NWB & 60% WB. Reps 30/15/15/15  - S/L ER 5#  - 90-90 ER OTB  - D2 OTB  - SA Press 13#        Not performed:   Prone MT Lvl III re-ed 3x5x10"3#  Prone LT Lvl III re-ed 3x5x10" 3#  8" Lat SLSD 2x12 ea.   SL RDL w/10# cable Row 3x10 ea.   1080 90-90 ER 5 KG # 3x15  1080 D2 3 Kg/4 Kg 3x12   1080 SA press 3x12 5Kg / 8Kg    therapeutic activities to improve functional performance for 9  minutes, includin# Chest pass plyos 5x10         Patient Education and Home Exercises       Education provided:   - Biomechanics and principles of proximal stability for distal control     Written Home Exercises Provided: yes. Exercises were reviewed and Ammon was able to demonstrate them prior to the end of the session.  Ammon demonstrated good  understanding of the education provided. See EMR under Patient Instructions for exercises provided during therapy sessions    Assessment     Ammon performed treatment as above today and tolerated all interventions well. Intro'd BFR with goal of hypertrophy and strength which was fatiguing but well tolerated. Pt responding well to intro level plyos. Will perform strength testing next tx prior to pt follow up with MD.      Ammon Is progressing well towards his goals.   Pt prognosis is Excellent.     Pt will continue to benefit from skilled outpatient physical therapy to address the deficits listed in the problem list box on initial evaluation, provide pt/family education and to maximize pt's level of independence in the home and community environment.     Pt's spiritual, cultural and educational needs considered and pt agreeable to plan of care and goals.     Anticipated barriers to physical therapy: None    Goals:     Short Term Goals: 2-4 weeks  1. Pt will be compliant with HEP 50% of prescribed amount.   2. The pt to demo improvement in R Elbow " ROM equal to uninvolved side   3.  Pt will improve FOTO score to meet or exceed MCID   4. 3-jaw kaden  strength 70% LSI     Long Term Goals: 12 weeks   Pt will be compliant with % of prescribed amount.   Pt will demo 110% LSI strength via HHD for shoulder girdle R, ER:IR 70%, ER:BW: 20%  Pt will progress through UE plyo and interval throwing program   The pt will report full participation in ADLs and IADLs without restrictions related to R UE.     Plan     Outpatient Physical Therapy 2 times weekly for 12 weeks to include the following interventions: Electrical Stimulation  , Manual Therapy, Moist Heat/ Ice, Neuromuscular Re-ed, Patient Education, Self Care, Therapeutic Activities, Therapeutic Exercise, and Dry Needling .     Atif Nichole, PT , DPT  Board Certified in Sports Physical Therapy

## 2025-05-12 ENCOUNTER — CLINICAL SUPPORT (OUTPATIENT)
Dept: REHABILITATION | Facility: HOSPITAL | Age: 20
End: 2025-05-12
Payer: COMMERCIAL

## 2025-05-12 ENCOUNTER — ATHLETIC TRAINING SESSION (OUTPATIENT)
Dept: SPORTS MEDICINE | Facility: CLINIC | Age: 20
End: 2025-05-12
Payer: COMMERCIAL

## 2025-05-12 ENCOUNTER — OFFICE VISIT (OUTPATIENT)
Dept: SPORTS MEDICINE | Facility: CLINIC | Age: 20
End: 2025-05-12
Payer: COMMERCIAL

## 2025-05-12 VITALS
SYSTOLIC BLOOD PRESSURE: 112 MMHG | HEART RATE: 96 BPM | HEIGHT: 77 IN | WEIGHT: 182.44 LBS | BODY MASS INDEX: 21.54 KG/M2 | DIASTOLIC BLOOD PRESSURE: 73 MMHG

## 2025-05-12 DIAGNOSIS — M25.521 RIGHT ELBOW PAIN: Primary | ICD-10-CM

## 2025-05-12 DIAGNOSIS — Z47.89 SURGICAL AFTERCARE, MUSCULOSKELETAL SYSTEM: Primary | ICD-10-CM

## 2025-05-12 DIAGNOSIS — G89.29 CHRONIC ELBOW PAIN, RIGHT: ICD-10-CM

## 2025-05-12 DIAGNOSIS — Z78.9 IMPAIRED MOTOR CONTROL: ICD-10-CM

## 2025-05-12 DIAGNOSIS — M25.521 CHRONIC ELBOW PAIN, RIGHT: ICD-10-CM

## 2025-05-12 DIAGNOSIS — M62.81 MUSCLE WEAKNESS OF RIGHT UPPER EXTREMITY: Primary | ICD-10-CM

## 2025-05-12 PROCEDURE — 97112 NEUROMUSCULAR REEDUCATION: CPT

## 2025-05-12 PROCEDURE — 97110 THERAPEUTIC EXERCISES: CPT

## 2025-05-12 PROCEDURE — 99999 PR PBB SHADOW E&M-EST. PATIENT-LVL III: CPT | Mod: PBBFAC,,, | Performed by: ORTHOPAEDIC SURGERY

## 2025-05-12 PROCEDURE — 3074F SYST BP LT 130 MM HG: CPT | Mod: CPTII,S$GLB,, | Performed by: ORTHOPAEDIC SURGERY

## 2025-05-12 PROCEDURE — 99024 POSTOP FOLLOW-UP VISIT: CPT | Mod: S$GLB,,, | Performed by: ORTHOPAEDIC SURGERY

## 2025-05-12 PROCEDURE — 3078F DIAST BP <80 MM HG: CPT | Mod: CPTII,S$GLB,, | Performed by: ORTHOPAEDIC SURGERY

## 2025-05-12 PROCEDURE — 1159F MED LIST DOCD IN RCRD: CPT | Mod: CPTII,S$GLB,, | Performed by: ORTHOPAEDIC SURGERY

## 2025-05-12 NOTE — PROGRESS NOTES
Reason for Encounter Follow-Up    Subjective:       Chief Complaint: Ammon Jones is a 20 y.o. male student at [employer]) who had concerns including Injury of the Right Elbow.    Ammon is a  for [employer]. He had surgery for ulnar nerve subluxation and we are doing rehab in the [facility] in addition to his formal physical therapy.     Handedness: right-handed  Sport played: baseball      Level: college      Position:pitcher      Injury        ROS              Objective:       General: Ammon is well-developed, well-nourished, appears stated age, in no acute distress, alert and oriented to time, place and person.     AT Session          Assessment:       Status: O - Out    Date Seen: 04/27/2025-05/03/2025    Date of Injury: 03/14/2025    Date Out: 03/14/2025    Date Cleared: NA        Treatment/Rehab/Maintenance:   Ammon completed therapeutic exercises to develop strength and ROM:    Date: 05/04/2025    Exercises  Sets x Reps  Weight   Shoulder CARs 2 x 10     Serratus punches  3 x 12  Green theraband    Dead bug snow angles  3 x 12  Green theraband   Table top row to tricep extension  3 x 12  Green theraband   Horizontal abduction  3 x 10  Green theraband                                     Ammon completed therapeutic exercises to develop strength and ROM:    Date: 05/03/2025    Exercises  Sets x Reps  Weight   Wrist stretches 2 x 30s         Wrist flexion  3 x 10  Green theraband    Wrist extension  3 x 10  Green theraband    Shoulder IR neutral  3 x 10  Green theraband    Shoulder ER neutral  3 x 10  Green theraband    Wall ball ABC 2 x ABC                        Light flush massage  Gentle AT administered shoulder and elbow mobility       Ammon completed therapeutic exercises to develop strength and ROM:    Date: 05/02/2025    Exercises  Sets x Reps  Weight   Shoulder CARs 2 x 10     Serratus punches  3 x 12  4 lbs    Dead bug snow angles  3 x 12  2 lbs    Table top row to tricep  extension  3 x 12  3 lbs    Side plank horizontal abduction 3 x 10  3 lbs    Rice bucket  1  Light: staying at the top of the rice                            Light flush massage 10 minutes  Gentle shoulder and elbow ROM (AT provided)          Plan:       1. Come in to [facility] for rehab on days he does not go to PT. Progress appropriately.   2. Physician Referral: yes  3. ED Referral:no  4. Parent/Guardian Notified: No  5. All questions were answered, ath. will contact me for questions or concerns in  the interim.  6.         Eligible to use School Insurance: Yes

## 2025-05-12 NOTE — PROGRESS NOTES
Reason for Encounter Follow-Up    Subjective:       Chief Complaint: Ammon Jones is a 20 y.o. male student at [employer]) who had concerns including Injury of the Right Elbow.    Ammon is a  for [employer]. He had surgery for ulnar nerve subluxation and we are doing rehab in the [facility] in addition to his formal physical therapy.     Handedness: right-handed  Sport played: baseball      Level: college      Position:pitcher      Injury        ROS              Objective:       General: Ammon is well-developed, well-nourished, appears stated age, in no acute distress, alert and oriented to time, place and person.     AT Session          Assessment:       Status: O - Out    Date Seen: 05/04/2025-05/10/2025    Date of Injury: 03/14/2025    Date Out: 03/14/2025    Date Cleared: NA        Treatment/Rehab/Maintenance:   Ammon completed therapeutic exercises to develop strength and ROM:    Date: 05/09/2025    Exercises  Sets x Reps  Weight   Wrist stretches 2 x 30s         Wrist flexion  3 x 10  4 lbs   Wrist extension  3 x 10  4 lbs   Shoulder IR neutral  3 x 10  Green theraband    Shoulder ER neutral  3 x 10  Green theraband    Wall ball ABC 2 x ABC                        Manual shoulder stretch series by AT  Modality: Massage  Date: 05/09/2025 Body Part: R arm Duration: 10 min         Modality: Massage  Date: 05/08/2025 Body Part: R arm Duration: 10 min       Modality: Normatec  Date 05/04/2025, Arms, and Duration 15 min             Plan:       1. Come in to [facility] for rehab on days he does not go to PT. Progress appropriately.   2. Physician Referral: yes  3. ED Referral:no  4. Parent/Guardian Notified: No  5. All questions were answered, ath. will contact me for questions or concerns in  the interim.  6.         Eligible to use School Insurance: Yes

## 2025-05-12 NOTE — PROGRESS NOTES
[facility]   Physical Therapy Treatment Note      Name: Ammon Jones  [facility] Number: [id number]    Therapy Diagnosis:   Encounter Diagnoses   Name Primary?    Muscle weakness of right upper extremity Yes    Chronic elbow pain, right     Impaired motor control      Physician: Sarmad Prince DO    Visit Date: 5/12/2025    Physician Orders: PT Eval and Treat   Medical Diagnosis from Referral: G56.21 (ICD-10-CM) - Lesion of right ulnar nerve   Evaluation Date: 4/10/2025  Authorization Period Expiration: 3/27/26  Plan of Care Expiration: 08/01/2025  Progress Note Due: 06/01/25  Visit # / Visits authorized: 11 / 20   FOTO: 1/N     Precautions: Standard     Time In: 0806  Time Out: 0909  Total Billable Time: 59 minutes    DOS: 3/31/25     PROCEDURES PERFORMED:   Right elbow open ulnar nerve decompression with anterior subfascial transposition (CPT 65795)     POSTOPERATIVE PLAN: The postoperative rehab protocol we will be according to the below stated program.  Patient will remain in his postoperative immobilization splint until 4/10.  At that time he will be converted to a hinged elbow brace.  We will start physical therapy after that appointment.  We have discussed a 4 to 6-month timeframe for return to competitive throwing.     The patient will wear a splint for the 1st week following surgery and then will wear a hinged brace for the next 2 weeks. For the first 2 weeks the elbow will not be allowed to straighten past 20 degrees in order to maintain the new position of the ulnar nerve. Full range of motion is expected at 3 weeks post op. For throwers the expected timeline to return to playing light catch is 6 weeks following surgery.  **Residual numbness and tingling and/or weakness may lead physician to modify the progression through the rehab protocol.     Week 1-2 Post Op  -Block Extension past 20 degrees (0 deg is straight), Flexion to patients tolerance  -Patient may begin light  rotator cuff exercises including: IR/ER with theraband,  -Standing flexion/scaption (maintain 20 degrees elbow extension), scapular exercises and postural education  -Light forearm isometrics for wrist flexion and extension  -Active wrist/finger exercises  -Light gripping exercises  Week 3-4 Post Op  -Full elbow extension expected at week 3  -Progression of rotator cuff and scapular strengthening program  -Resisted forearm and wrist strengthening  -Light biceps and triceps strengthening  -Closed Chain shoulder strengthening with DS2 Platform  -Throwers begin scapular posture training ( prone T, W, V exercises, standing scapular posing, etc)  Week 5-6 Post Op  -Progressive resistive exercises including bicep, tricep, deltoid, trap, pectoralis to restore normal strength  -Following week 6 athlete may begin interval throwing/hitting program if strength, ROM, and healing allow. (Begin only after cleared by Dr. Waldron)    Subjective     Pt reports: No pain or stiffness. He sees his surgeon today. Might be cleared to begin a throwing program pending objective testing today.  .  He was compliant with home exercise program.  Response to previous treatment: Ongoing  Functional change: Ongoing    Pain: 1/10  Location: right elbow      Objective      Objective Measures updated at progress report unless specified.     Active Range of Motion:   Elbow Left Right   Flexion 145 145   extension +5 HE +5 HE   pronation 90 90   supination 85 85       Strength:(lbs via HHD)   Right Left   Scaption 25.9 22.6   Shoulder ER at side 28.2 26.2   Shoulder IR at side  28.5 26.9   Shoulder ER at 90-90 28.4 27.4   Shoulder IR at 90-90 28.0 27.1   Serratus Anterior/Upward rotation & protraction     Middle Trap 3/5 4-/5   Lower Trap 4-/5 3/5           Treatment     Ammon received the treatments listed below:      therapeutic exercises to develop strength, endurance, ROM, and flexibility for 25 minutes including:  Prisoner's warm up x2 rounds  "2#  90-90 ER 2x10  90-90IR 2x10    Updated objective testing including strength with results as above     Not performed:  10# FDS plate carry LOT 4x  Eccentric biceps curls over bolster 3x12 5#  Skull crushers supinated 3x12   1080 Tricep ext to lat pull 5 Kg / 8 Kg 3x12   1080 ER 3x12 3 KG/ 5 Kg    manual therapy techniques:  were applied for 00 minutes, including:    neuromuscular re-education activities to improve: Coordination, Kinesthetic, Sense, Proprioception, Posture, and Motor Conrolt for 30 minutes. The following activities were included:    BFR completed to improve strength/hypertrophy of Shoulder girdle of R LE at 80% NWB & 60% WB. Reps 30/15/15/15  - S/L ER 5#  - 90-90 ER OTB  - SA Press 13#        Not performed:   Prone MT Lvl III re-ed 3x5x10"3#  Prone LT Lvl III re-ed 3x5x10" 3#  8" Lat SLSD 2x12 ea.   SL RDL w/10# cable Row 3x10 ea.   1080 90-90 ER 5 KG # 3x15  1080 D2 3 Kg/4 Kg 3x12   1080 SA press 3x12 5Kg / 8Kg    therapeutic activities to improve functional performance for 4  minutes, includin# Chest pass plyos 2x10         Patient Education and Home Exercises       Education provided:   - Biomechanics and principles of proximal stability for distal control     Written Home Exercises Provided: yes. Exercises were reviewed and Ammon was able to demonstrate them prior to the end of the session.  Ammon demonstrated good  understanding of the education provided. See EMR under Patient Instructions for exercises provided during therapy sessions    Assessment     Ammon performed treatment as above today with updated objective test measures. Pt demo's stronger testing on throwing side, but I would like to see him achieve 20% ER/BW strength and greater strength relative to his non-throwing side. LT strength most limited which will be specifically targeted. Ammon will continue plyometric and pre-throwing progressions as part of his rehab and initiate light tossing in 1-2 weeks pending surgeon " approval. I have rounded with his physician team on the case today.      Ammon Is progressing well towards his goals.   Pt prognosis is Excellent.     Pt will continue to benefit from skilled outpatient physical therapy to address the deficits listed in the problem list box on initial evaluation, provide pt/family education and to maximize pt's level of independence in the home and community environment.     Pt's spiritual, cultural and educational needs considered and pt agreeable to plan of care and goals.     Anticipated barriers to physical therapy: None    Goals:     Short Term Goals: 2-4 weeks  1. Pt will be compliant with HEP 50% of prescribed amount.   2. The pt to demo improvement in R Elbow ROM equal to uninvolved side   3.  Pt will improve FOTO score to meet or exceed MCID   4. 3-jaw kaden  strength 70% LSI     Long Term Goals: 12 weeks   Pt will be compliant with % of prescribed amount.   Pt will demo 110% LSI strength via HHD for shoulder girdle R, ER:IR 70%, ER:BW: 20%  Pt will progress through UE plyo and interval throwing program   The pt will report full participation in ADLs and IADLs without restrictions related to R UE.     Plan     Outpatient Physical Therapy 2 times weekly for 12 weeks to include the following interventions: Electrical Stimulation  , Manual Therapy, Moist Heat/ Ice, Neuromuscular Re-ed, Patient Education, Self Care, Therapeutic Activities, Therapeutic Exercise, and Dry Needling .     Atif Nichole, PT , DPT  Board Certified in Sports Physical Therapy             Zoryve Counseling:  I discussed with the patient that Zoryve is not for use in the eyes, mouth or vagina. The most commonly reported side effects include diarrhea, headache, insomnia, application site pain, upper respiratory tract infections, and urinary tract infections.  All of the patient's questions and concerns were addressed.

## 2025-05-15 ENCOUNTER — CLINICAL SUPPORT (OUTPATIENT)
Dept: REHABILITATION | Facility: HOSPITAL | Age: 20
End: 2025-05-15
Payer: COMMERCIAL

## 2025-05-15 DIAGNOSIS — G89.29 CHRONIC ELBOW PAIN, RIGHT: ICD-10-CM

## 2025-05-15 DIAGNOSIS — M62.81 MUSCLE WEAKNESS OF RIGHT UPPER EXTREMITY: Primary | ICD-10-CM

## 2025-05-15 DIAGNOSIS — Z78.9 IMPAIRED MOTOR CONTROL: ICD-10-CM

## 2025-05-15 DIAGNOSIS — M25.521 CHRONIC ELBOW PAIN, RIGHT: ICD-10-CM

## 2025-05-15 PROCEDURE — 97530 THERAPEUTIC ACTIVITIES: CPT

## 2025-05-15 PROCEDURE — 97112 NEUROMUSCULAR REEDUCATION: CPT

## 2025-05-15 PROCEDURE — 97110 THERAPEUTIC EXERCISES: CPT

## 2025-05-15 NOTE — PROGRESS NOTES
[facility]   Physical Therapy Treatment Note      Name: Ammon Jones  [facility] Number: [id number]    Therapy Diagnosis:   Encounter Diagnoses   Name Primary?    Muscle weakness of right upper extremity Yes    Chronic elbow pain, right     Impaired motor control      Physician: Sarmad Prince DO    Visit Date: 5/15/2025    Physician Orders: PT Eval and Treat   Medical Diagnosis from Referral: G56.21 (ICD-10-CM) - Lesion of right ulnar nerve   Evaluation Date: 4/10/2025  Authorization Period Expiration: 3/27/26  Plan of Care Expiration: 08/01/2025  Progress Note Due: 06/01/25  Visit # / Visits authorized: 12 / 20   FOTO: 1/N     Precautions: Standard     Time In: 0810  Time Out: 0904  Total Billable Time: 54 minutes    DOS: 3/31/25     PROCEDURES PERFORMED:   Right elbow open ulnar nerve decompression with anterior subfascial transposition (CPT 18092)     POSTOPERATIVE PLAN: The postoperative rehab protocol we will be according to the below stated program.  Patient will remain in his postoperative immobilization splint until 4/10.  At that time he will be converted to a hinged elbow brace.  We will start physical therapy after that appointment.  We have discussed a 4 to 6-month timeframe for return to competitive throwing.     The patient will wear a splint for the 1st week following surgery and then will wear a hinged brace for the next 2 weeks. For the first 2 weeks the elbow will not be allowed to straighten past 20 degrees in order to maintain the new position of the ulnar nerve. Full range of motion is expected at 3 weeks post op. For throwers the expected timeline to return to playing light catch is 6 weeks following surgery.  **Residual numbness and tingling and/or weakness may lead physician to modify the progression through the rehab protocol.     Week 1-2 Post Op  -Block Extension past 20 degrees (0 deg is straight), Flexion to patients tolerance  -Patient may begin light  rotator cuff exercises including: IR/ER with theraband,  -Standing flexion/scaption (maintain 20 degrees elbow extension), scapular exercises and postural education  -Light forearm isometrics for wrist flexion and extension  -Active wrist/finger exercises  -Light gripping exercises  Week 3-4 Post Op  -Full elbow extension expected at week 3  -Progression of rotator cuff and scapular strengthening program  -Resisted forearm and wrist strengthening  -Light biceps and triceps strengthening  -Closed Chain shoulder strengthening with DS2 Platform  -Throwers begin scapular posture training ( prone T, W, V exercises, standing scapular posing, etc)  Week 5-6 Post Op  -Progressive resistive exercises including bicep, tricep, deltoid, trap, pectoralis to restore normal strength  -Following week 6 athlete may begin interval throwing/hitting program if strength, ROM, and healing allow. (Begin only after cleared by Dr. Waldron)    Subjective     Pt reports: No pain or stiffness. He sees his surgeon today. Might be cleared to begin a throwing program pending objective testing today.  .  He was compliant with home exercise program.  Response to previous treatment: Ongoing  Functional change: Ongoing    Pain: 1/10  Location: right elbow      Objective      Objective Measures updated at progress report unless specified.     Active Range of Motion:   Elbow Left Right   Flexion 145 145   extension +5 HE +5 HE   pronation 90 90   supination 85 85       Strength:(lbs via HHD)   Right Left   Scaption 25.9 22.6   Shoulder ER at side 28.2 26.2   Shoulder IR at side  28.5 26.9   Shoulder ER at 90-90 28.4 27.4   Shoulder IR at 90-90 28.0 27.1   Serratus Anterior/Upward rotation & protraction     Middle Trap 4-/5 3/5   Lower Trap 4-/5 3/5      Strength: (ASIF Dynamometer in lbs.) Average 3 trials, Position II:     5/15/2025 5/15/2025    Left Right   80-80 3 Jaw Madhu  51# 55#           Treatment     Ammon received the treatments listed  "below:      therapeutic exercises to develop strength, endurance, ROM, and flexibility for 13 minutes including:  Prisoner's warm up x2 rounds 2#  90-90 Pronation Orange CF band 3x10     Not performed:  10# FDS plate carry LOT 4x  Eccentric biceps curls over bolster 3x12 5#  Skull crushers supinated 3x12   1080 Tricep ext to lat pull 5 Kg / 8 Kg 3x12   1080 ER 3x12 3 KG/ 5 Kg    manual therapy techniques:  were applied for 00 minutes, including:    neuromuscular re-education activities to improve: Coordination, Kinesthetic, Sense, Proprioception, Posture, and Motor Conrolt for 18 minutes. The following activities were included:  1080 90-90 ER 6 KG/7Kg # 3x12  1080 ER 5Kg/ 7Kg 3x12       Not performed:   Prone MT Lvl III re-ed 3x5x10"3#  Prone LT Lvl III re-ed 3x5x10" 3#  8" Lat SLSD 2x12 ea.   SL RDL w/10# cable Row 3x10 ea.   1080 D2 3 Kg/4 Kg 3x12   1080 SA press 3x12 5Kg / 8Kg    therapeutic activities to improve functional performance for 23  minutes, includin# MB plyo series  90-90 Wall ball bounce 1 Kg 5x to burn  15# DB globe carries <-> 10 yards 3x         Patient Education and Home Exercises       Education provided:   - Biomechanics and principles of proximal stability for distal control     Written Home Exercises Provided: yes. Exercises were reviewed and Ammon was able to demonstrate them prior to the end of the session.  Ammon demonstrated good  understanding of the education provided. See EMR under Patient Instructions for exercises provided during therapy sessions    Assessment     Ammon performed treatment as above with emphasis on continuing progressive strengthening. Added FDS/pronator emphasis strengthening per results on  strength testing as above. Progressed with UE plyos as pre-throwing training which was well tolerated.       Ammon Is progressing well towards his goals.   Pt prognosis is Excellent.     Pt will continue to benefit from skilled outpatient physical therapy to address the " deficits listed in the problem list box on initial evaluation, provide pt/family education and to maximize pt's level of independence in the home and community environment.     Pt's spiritual, cultural and educational needs considered and pt agreeable to plan of care and goals.     Anticipated barriers to physical therapy: None    Goals:     Short Term Goals: 2-4 weeks  1. Pt will be compliant with HEP 50% of prescribed amount.   2. The pt to demo improvement in R Elbow ROM equal to uninvolved side   3.  Pt will improve FOTO score to meet or exceed MCID   4. 3-jaw kaden  strength 70% LSI     Long Term Goals: 12 weeks   Pt will be compliant with % of prescribed amount.   Pt will demo 110% LSI strength via HHD for shoulder girdle R, ER:IR 70%, ER:BW: 20%  Pt will progress through UE plyo and interval throwing program   The pt will report full participation in ADLs and IADLs without restrictions related to R UE.     Plan     Outpatient Physical Therapy 2 times weekly for 12 weeks to include the following interventions: Electrical Stimulation  , Manual Therapy, Moist Heat/ Ice, Neuromuscular Re-ed, Patient Education, Self Care, Therapeutic Activities, Therapeutic Exercise, and Dry Needling .     Atif Nichole, PT , DPT  Board Certified in Sports Physical Therapy

## 2025-05-19 ENCOUNTER — CLINICAL SUPPORT (OUTPATIENT)
Dept: REHABILITATION | Facility: HOSPITAL | Age: 20
End: 2025-05-19
Payer: COMMERCIAL

## 2025-05-19 DIAGNOSIS — G89.29 CHRONIC ELBOW PAIN, RIGHT: ICD-10-CM

## 2025-05-19 DIAGNOSIS — Z78.9 IMPAIRED MOTOR CONTROL: ICD-10-CM

## 2025-05-19 DIAGNOSIS — M25.521 CHRONIC ELBOW PAIN, RIGHT: ICD-10-CM

## 2025-05-19 DIAGNOSIS — M62.81 MUSCLE WEAKNESS OF RIGHT UPPER EXTREMITY: Primary | ICD-10-CM

## 2025-05-19 PROCEDURE — 97110 THERAPEUTIC EXERCISES: CPT

## 2025-05-19 PROCEDURE — 97112 NEUROMUSCULAR REEDUCATION: CPT

## 2025-05-19 PROCEDURE — 97530 THERAPEUTIC ACTIVITIES: CPT

## 2025-05-19 NOTE — PROGRESS NOTES
[facility]   Physical Therapy Treatment Note      Name: Ammon Jones  [facility] Number: [id number]    Therapy Diagnosis:   Encounter Diagnoses   Name Primary?    Muscle weakness of right upper extremity Yes    Chronic elbow pain, right     Impaired motor control      Physician: Sarmad Prince DO    Visit Date: 5/19/2025    Physician Orders: PT Eval and Treat   Medical Diagnosis from Referral: G56.21 (ICD-10-CM) - Lesion of right ulnar nerve   Evaluation Date: 4/10/2025  Authorization Period Expiration: 3/27/26  Plan of Care Expiration: 08/01/2025  Progress Note Due: 06/01/25  Visit # / Visits authorized: 13 / 20   FOTO: 1/N     Precautions: Standard     Time In: 1330  Time Out: 1433  Total Billable Time: 63 minutes    DOS: 3/31/25     PROCEDURES PERFORMED:   Right elbow open ulnar nerve decompression with anterior subfascial transposition (CPT 30841)     POSTOPERATIVE PLAN: The postoperative rehab protocol we will be according to the below stated program.  Patient will remain in his postoperative immobilization splint until 4/10.  At that time he will be converted to a hinged elbow brace.  We will start physical therapy after that appointment.  We have discussed a 4 to 6-month timeframe for return to competitive throwing.     The patient will wear a splint for the 1st week following surgery and then will wear a hinged brace for the next 2 weeks. For the first 2 weeks the elbow will not be allowed to straighten past 20 degrees in order to maintain the new position of the ulnar nerve. Full range of motion is expected at 3 weeks post op. For throwers the expected timeline to return to playing light catch is 6 weeks following surgery.  **Residual numbness and tingling and/or weakness may lead physician to modify the progression through the rehab protocol.     Week 1-2 Post Op  -Block Extension past 20 degrees (0 deg is straight), Flexion to patients tolerance  -Patient may begin light  rotator cuff exercises including: IR/ER with theraband,  -Standing flexion/scaption (maintain 20 degrees elbow extension), scapular exercises and postural education  -Light forearm isometrics for wrist flexion and extension  -Active wrist/finger exercises  -Light gripping exercises  Week 3-4 Post Op  -Full elbow extension expected at week 3  -Progression of rotator cuff and scapular strengthening program  -Resisted forearm and wrist strengthening  -Light biceps and triceps strengthening  -Closed Chain shoulder strengthening with DS2 Platform  -Throwers begin scapular posture training ( prone T, W, V exercises, standing scapular posing, etc)  Week 5-6 Post Op  -Progressive resistive exercises including bicep, tricep, deltoid, trap, pectoralis to restore normal strength  -Following week 6 athlete may begin interval throwing/hitting program if strength, ROM, and healing allow. (Begin only after cleared by Dr. Waldron)    Subjective     Pt reports: Elbow feels good.   .  He was compliant with home exercise program.  Response to previous treatment: Ongoing  Functional change: Ongoing    Pain: 1/10  Location: right elbow      Objective      Objective Measures updated at progress report unless specified.     5/12/25    Active Range of Motion:   Elbow Left Right   Flexion 145 145   extension +5 HE +5 HE   pronation 90 90   supination 85 85       Strength:(lbs via HHD)   Right Left   Scaption 25.9 22.6   Shoulder ER at side 28.2 26.2   Shoulder IR at side  28.5 26.9   Shoulder ER at 90-90 28.4 27.4   Shoulder IR at 90-90 28.0 27.1   Serratus Anterior/Upward rotation & protraction     Middle Trap 4-/5 3/5   Lower Trap 4-/5 3/5      Strength: (ASIF Dynamometer in lbs.) Average 3 trials, Position II:     5/19/2025 5/19/2025    Left Right   80-80 3 Jaw Madhu  51# 55#           Treatment     Ammon received the treatments listed below:      therapeutic exercises to develop strength, endurance, ROM, and flexibility for 14  "minutes including:  Prisoner's warm up x2 rounds 2#  90-90 Pronation Orange CF band 3x10     Not performed:  10# FDS plate carry LOT 4x  1080 Tricep ext to lat pull 5 Kg / 8 Kg 3x12     manual therapy techniques:  were applied for 00 minutes, including:    neuromuscular re-education activities to improve: Coordination, Kinesthetic, Sense, Proprioception, Posture, and Motor Conrolt for 28 minutes. The following activities were included:  D2 Halo w/5# [place] 3x8 ea.   SL RDL w/15# cable Row 3x10 ea.   1080 90-90 ER 6 KG/8Kg # 3x12  1080 ER 6Kg/ 9Kg 3x12       Not performed:   Prone MT Lvl III re-ed 3x5x10"3#  Prone LT Lvl III re-ed 3x5x10" 3#  8" Lat SLSD 2x12 ea.   1080 D2 3 Kg/4 Kg 3x12   1080 SA press 3x12 5Kg / 8Kg    therapeutic activities to improve functional performance for 21  minutes, includin# MB plyo series  90-90 Wall ball bounce 1 Kg 5x to burn  Towel drill x25   15# DB globe carries <-> 10 yards 3x         Patient Education and Home Exercises       Education provided:   - Biomechanics and principles of proximal stability for distal control     Written Home Exercises Provided: yes. Exercises were reviewed and Ammon was able to demonstrate them prior to the end of the session.  Ammon demonstrated good  understanding of the education provided. See EMR under Patient Instructions for exercises provided during therapy sessions    Assessment     Ammon performed treatment as above with emphasis on continuing progressive strengthening. Continued with current emphasis on adding foundational strength with addition of towel drill as pre-throwing training.       Ammon Is progressing well towards his goals.   Pt prognosis is Excellent.     Pt will continue to benefit from skilled outpatient physical therapy to address the deficits listed in the problem list box on initial evaluation, provide pt/family education and to maximize pt's level of independence in the home and community environment.     Pt's " spiritual, cultural and educational needs considered and pt agreeable to plan of care and goals.     Anticipated barriers to physical therapy: None    Goals:     Short Term Goals: 2-4 weeks  1. Pt will be compliant with HEP 50% of prescribed amount.   2. The pt to demo improvement in R Elbow ROM equal to uninvolved side   3.  Pt will improve FOTO score to meet or exceed MCID   4. 3-jaw kaden  strength 70% LSI     Long Term Goals: 12 weeks   Pt will be compliant with % of prescribed amount.   Pt will demo 110% LSI strength via HHD for shoulder girdle R, ER:IR 70%, ER:BW: 20%  Pt will progress through UE plyo and interval throwing program   The pt will report full participation in ADLs and IADLs without restrictions related to R UE.     Plan     Outpatient Physical Therapy 2 times weekly for 12 weeks to include the following interventions: Electrical Stimulation  , Manual Therapy, Moist Heat/ Ice, Neuromuscular Re-ed, Patient Education, Self Care, Therapeutic Activities, Therapeutic Exercise, and Dry Needling .     Atif Nichole, PT , DPT  Board Certified in Sports Physical Therapy

## 2025-05-21 ENCOUNTER — ATHLETIC TRAINING SESSION (OUTPATIENT)
Dept: SPORTS MEDICINE | Facility: CLINIC | Age: 20
End: 2025-05-21
Payer: COMMERCIAL

## 2025-05-21 DIAGNOSIS — M25.521 RIGHT ELBOW PAIN: Primary | ICD-10-CM

## 2025-05-21 NOTE — PROGRESS NOTES
Reason for Encounter Follow-Up    Subjective:       Chief Complaint: Ammon Jones is a 20 y.o. male student at [employer]) who had concerns including Injury of the Right Elbow.    Ammon is a  for [employer]. He had surgery for ulnar nerve subluxation and we are doing rehab in the [facility] in addition to his formal physical therapy.     Handedness: right-handed  Sport played: baseball      Level: college      Position:pitcher      Injury        ROS              Objective:       General: Ammon is well-developed, well-nourished, appears stated age, in no acute distress, alert and oriented to time, place and person.     AT Session          Assessment:       Status: O - Out    Date Seen: 05/11/2025-05/17/2025    Date of Injury: 03/14/2025    Date Out: 03/14/2025    Date Cleared: NA        Treatment/Rehab/Maintenance:   Ammon completed therapeutic exercises to develop strength and ROM:    Date: 05/16/2025    Exercises  Sets x Reps  Weight   Wrist stretches 2 x 30s    UE nerve flosses  2 x 10     Prone elevated I, Y, T 3 x 10  3 lbs    Prone snow angles  3 x 8  3 lbs    Serratus scoops  3 x 10  Red theraband    Side lying ER toss  3 x 10  1.1 lbs                             Manual shoulder stretch series by AT  Modality: Massage  Date: 05/16/2025 Body Part: R arm Duration: 10 min     Ammon completed therapeutic exercises to develop strength and ROM:    Date: 05/14/2025    Exercises  Sets x Reps  Weight   Wrist stretches 2 x 30s         Wrist flexion  3 x 10  4 lbs   Wrist extension  3 x 10  4 lbs   Shoulder IR neutral  3 x 10  Green theraband    Shoulder ER neutral  3 x 10  Green theraband    Wall ball ABC 2 x ABC                        Manual shoulder stretch series by AT  Modality: Massage  Date: 05/14/2025 Body Part: R arm Duration: 10 min       Ammon completed therapeutic exercises to develop strength and ROM:    Date: 05/04/2025    Exercises  Sets x Reps  Weight   Shoulder CARs 2 x 10      Serratus punches  3 x 12  4 lbs    Dead bug snow angles  3 x 12  3 lbs   Table top row to tricep extension  3 x 12  3 lbs    Horizontal abduction  3 x 10  5 lbs   Rice bucket  1  Light                             Manual shoulder stretch series by AT  Modality: Massage  Date: 05/13/2025 Body Part: R arm Duration: 10 min     Plan:       1. Come in to [facility] for rehab on days he does not go to PT. Progress appropriately.   2. Physician Referral: yes  3. ED Referral:no  4. Parent/Guardian Notified: No  5. All questions were answered, ath. will contact me for questions or concerns in  the interim.  6.         Eligible to use School Insurance: Yes

## 2025-05-22 ENCOUNTER — CLINICAL SUPPORT (OUTPATIENT)
Dept: REHABILITATION | Facility: HOSPITAL | Age: 20
End: 2025-05-22
Payer: COMMERCIAL

## 2025-05-22 DIAGNOSIS — M25.521 CHRONIC ELBOW PAIN, RIGHT: ICD-10-CM

## 2025-05-22 DIAGNOSIS — G89.29 CHRONIC ELBOW PAIN, RIGHT: ICD-10-CM

## 2025-05-22 DIAGNOSIS — M62.81 MUSCLE WEAKNESS OF RIGHT UPPER EXTREMITY: Primary | ICD-10-CM

## 2025-05-22 DIAGNOSIS — Z78.9 IMPAIRED MOTOR CONTROL: ICD-10-CM

## 2025-05-22 PROCEDURE — 97530 THERAPEUTIC ACTIVITIES: CPT

## 2025-05-22 PROCEDURE — 97110 THERAPEUTIC EXERCISES: CPT

## 2025-05-22 PROCEDURE — 97112 NEUROMUSCULAR REEDUCATION: CPT

## 2025-05-22 NOTE — PROGRESS NOTES
[facility]   Physical Therapy Treatment Note      Name: Ammon Jones  [facility] Number: [id number]    Therapy Diagnosis:   Encounter Diagnoses   Name Primary?    Muscle weakness of right upper extremity Yes    Chronic elbow pain, right     Impaired motor control      Physician: Sarmad Prince DO    Visit Date: 5/22/2025    Physician Orders: PT Eval and Treat   Medical Diagnosis from Referral: G56.21 (ICD-10-CM) - Lesion of right ulnar nerve   Evaluation Date: 4/10/2025  Authorization Period Expiration: 3/27/26  Plan of Care Expiration: 08/01/2025  Progress Note Due: 06/01/25  Visit # / Visits authorized: 14 / 20   FOTO: 1/N     Precautions: Standard     Time In: 1000  Time Out: 1057  Total Billable Time: 57 minutes    DOS: 3/31/25     PROCEDURES PERFORMED:   Right elbow open ulnar nerve decompression with anterior subfascial transposition (CPT 09330)     POSTOPERATIVE PLAN: The postoperative rehab protocol we will be according to the below stated program.  Patient will remain in his postoperative immobilization splint until 4/10.  At that time he will be converted to a hinged elbow brace.  We will start physical therapy after that appointment.  We have discussed a 4 to 6-month timeframe for return to competitive throwing.     The patient will wear a splint for the 1st week following surgery and then will wear a hinged brace for the next 2 weeks. For the first 2 weeks the elbow will not be allowed to straighten past 20 degrees in order to maintain the new position of the ulnar nerve. Full range of motion is expected at 3 weeks post op. For throwers the expected timeline to return to playing light catch is 6 weeks following surgery.  **Residual numbness and tingling and/or weakness may lead physician to modify the progression through the rehab protocol.     Week 1-2 Post Op  -Block Extension past 20 degrees (0 deg is straight), Flexion to patients tolerance  -Patient may begin light  rotator cuff exercises including: IR/ER with theraband,  -Standing flexion/scaption (maintain 20 degrees elbow extension), scapular exercises and postural education  -Light forearm isometrics for wrist flexion and extension  -Active wrist/finger exercises  -Light gripping exercises  Week 3-4 Post Op  -Full elbow extension expected at week 3  -Progression of rotator cuff and scapular strengthening program  -Resisted forearm and wrist strengthening  -Light biceps and triceps strengthening  -Closed Chain shoulder strengthening with DS2 Platform  -Throwers begin scapular posture training ( prone T, W, V exercises, standing scapular posing, etc)  Week 5-6 Post Op  -Progressive resistive exercises including bicep, tricep, deltoid, trap, pectoralis to restore normal strength  -Following week 6 athlete may begin interval throwing/hitting program if strength, ROM, and healing allow. (Begin only after cleared by Dr. Waldron)    Subjective     Pt reports: Elbow feels good. Mild muscle soreness from workouts.   .  He was compliant with home exercise program.  Response to previous treatment: Ongoing  Functional change: Ongoing    Pain: 1/10  Location: right elbow      Objective      Objective Measures updated at progress report unless specified.     5/12/25    Active Range of Motion:   Elbow Left Right   Flexion 145 145   extension +5 HE +5 HE   pronation 90 90   supination 85 85       Strength:(lbs via HHD)   Right Left   Scaption 25.9 22.6   Shoulder ER at side 28.2 26.2   Shoulder IR at side  28.5 26.9   Shoulder ER at 90-90 28.4 27.4   Shoulder IR at 90-90 28.0 27.1   Serratus Anterior/Upward rotation & protraction     Middle Trap 4-/5 3/5   Lower Trap 4-/5 3/5      Strength: (ASIF Dynamometer in lbs.) Average 3 trials, Position II:     5/22/2025 5/22/2025    Left Right   80-80 3 Jaw Madhu  51# 55#           Treatment     Ammon received the treatments listed below:      therapeutic exercises to develop strength,  "endurance, ROM, and flexibility for 8 minutes includin-90 Pronation Orange CF band 3x10     Not performed:  10# FDS plate carry LOT 4x  1080 Tricep ext to lat pull 5 Kg / 8 Kg 3x12     manual therapy techniques:  were applied for 00 minutes, including:    neuromuscular re-education activities to improve: Coordination, Kinesthetic, Sense, Proprioception, Posture, and Motor Conrolt for 28 minutes. The following activities were included:  D2 Halo w/5#  club 3x8 ea.   SL RDL w/15# cable Row 3x10 ea.   1080 90-90 ER 6 KG/8Kg # 3x12  1080 ER 6Kg/ 9Kg 3x12       Not performed:   Prone MT Lvl III re-ed 3x5x10"3#  Prone LT Lvl III re-ed 3x5x10" 3#  8" Lat SLSD 2x12 ea.   1080 D2 3 Kg/4 Kg 3x12   1080 SA press 3x12 5Kg / 8Kg    therapeutic activities to improve functional performance for 21  minutes, includin# MB plyo series  90-90 Wall ball bounce 1 Kg 5x to burn  Towel drill x25   20# Plate FDS carries <-> 10 yards 3x         Patient Education and Home Exercises       Education provided:   - Biomechanics and principles of proximal stability for distal control     Written Home Exercises Provided: yes. Exercises were reviewed and Ammon was able to demonstrate them prior to the end of the session.  Ammon demonstrated good  understanding of the education provided. See EMR under Patient Instructions for exercises provided during therapy sessions    Assessment     Ammon performed treatment as above with emphasis on continuing progressive strengthening. Continued with current emphasis on adding foundational strength with addition of towel drill as pre-throwing training.       Ammon Is progressing well towards his goals.   Pt prognosis is Excellent.     Pt will continue to benefit from skilled outpatient physical therapy to address the deficits listed in the problem list box on initial evaluation, provide pt/family education and to maximize pt's level of independence in the home and community environment.     Pt's " spiritual, cultural and educational needs considered and pt agreeable to plan of care and goals.     Anticipated barriers to physical therapy: None    Goals:     Short Term Goals: 2-4 weeks  1. Pt will be compliant with HEP 50% of prescribed amount.   2. The pt to demo improvement in R Elbow ROM equal to uninvolved side   3.  Pt will improve FOTO score to meet or exceed MCID   4. 3-jaw kaden  strength 70% LSI     Long Term Goals: 12 weeks   Pt will be compliant with % of prescribed amount.   Pt will demo 110% LSI strength via HHD for shoulder girdle R, ER:IR 70%, ER:BW: 20%  Pt will progress through UE plyo and interval throwing program   The pt will report full participation in ADLs and IADLs without restrictions related to R UE.     Plan     Outpatient Physical Therapy 2 times weekly for 12 weeks to include the following interventions: Electrical Stimulation  , Manual Therapy, Moist Heat/ Ice, Neuromuscular Re-ed, Patient Education, Self Care, Therapeutic Activities, Therapeutic Exercise, and Dry Needling .     Atif Nichole, PT , DPT  Board Certified in Sports Physical Therapy

## 2025-05-27 ENCOUNTER — ATHLETIC TRAINING SESSION (OUTPATIENT)
Dept: SPORTS MEDICINE | Facility: CLINIC | Age: 20
End: 2025-05-27
Payer: COMMERCIAL

## 2025-05-27 DIAGNOSIS — M25.521 RIGHT ELBOW PAIN: Primary | ICD-10-CM

## 2025-05-27 NOTE — PROGRESS NOTES
Reason for Encounter Follow-Up    Subjective:       Chief Complaint: Ammon Jones is a 20 y.o. male student at [employer]) who had concerns including Injury of the Right Elbow.    Ammon is a  for [employer]. He had surgery for ulnar nerve subluxation and we are doing rehab in the [facility] in addition to his formal physical therapy.     Handedness: right-handed  Sport played: baseball      Level: college      Position:pitcher      Injury        ROS              Objective:       General: Ammon is well-developed, well-nourished, appears stated age, in no acute distress, alert and oriented to time, place and person.     AT Session          Assessment:       Status: O - Out    Date Seen: 05/18/2025-05/25/2025    Date of Injury: 03/14/2025    Date Out: 03/14/2025    Date Cleared: NA        Treatment/Rehab/Maintenance:   Ammon completed therapeutic exercises to develop strength and ROM:    Date: 05/23/2025    Exercises  Sets x Reps  Weight   Wrist stretches  2 x 30s     UE nerve flosses  2 x 10     Prone elevated I, Y, T 3 x 10  3 lbs    Prone snowangles  3 x 8  3 lbs   Serratus scoops  3 x 10  Red theraband    Side lying ER toss  3 x 10  1.1 lbs                             Manual shoulder stretch series by AT  Modality: Massage  Date: 05/23/2025 Body Part: R arm Duration: 10 min     Ammon completed therapeutic exercises to develop strength and ROM:    Date: 05/21/2025    Exercises  Sets x Reps  Weight   UE nerve flosses  2 x 10     Shoulder CARs  2 x 10     D1/D2 flexion and extension  3 x 10  Red theraband    Shoulder drivers  3 x 10  Red theraband    Wrist flexion  3 x 12  5 lbs    Wrist extension  3 x 12  5 lbs    Pronation/supination  4 x 12  4 lbs                        Manual shoulder stretch series by AT  Modality: Massage  Date: 05/21/2025 Body Part: R arm Duration: 10 min       Ammon completed therapeutic exercises to develop strength and ROM:    Date: 05/16/2025    Exercises  Sets x  Reps  Weight   Wrist stretches  2 x 30s     UE nerve flosses  2 x 10     Prone elevated I, Y, T 3 x 10  3 lbs    Prone snowangles  3 x 8  3 lbs   Serratus scoops  3 x 10  Red theraband    Side lying ER toss  3 x 10  1.1 lbs                             Manual shoulder stretch series by AT  Modality: Massage  Date: 05/16/2025 Body Part: R arm Duration: 10 min     Plan:       1. Come in to [facility] for rehab on days he does not go to PT. Progress appropriately.   2. Physician Referral: yes  3. ED Referral:no  4. Parent/Guardian Notified: No  5. All questions were answered, ath. will contact me for questions or concerns in  the interim.  6.         Eligible to use School Insurance: Yes

## 2025-05-29 ENCOUNTER — CLINICAL SUPPORT (OUTPATIENT)
Dept: REHABILITATION | Facility: HOSPITAL | Age: 20
End: 2025-05-29
Payer: COMMERCIAL

## 2025-05-29 DIAGNOSIS — Z78.9 IMPAIRED MOTOR CONTROL: ICD-10-CM

## 2025-05-29 DIAGNOSIS — M62.81 MUSCLE WEAKNESS OF RIGHT UPPER EXTREMITY: Primary | ICD-10-CM

## 2025-05-29 DIAGNOSIS — M25.521 CHRONIC ELBOW PAIN, RIGHT: ICD-10-CM

## 2025-05-29 DIAGNOSIS — G89.29 CHRONIC ELBOW PAIN, RIGHT: ICD-10-CM

## 2025-05-29 PROCEDURE — 97110 THERAPEUTIC EXERCISES: CPT

## 2025-05-29 PROCEDURE — 97112 NEUROMUSCULAR REEDUCATION: CPT

## 2025-05-29 PROCEDURE — 97530 THERAPEUTIC ACTIVITIES: CPT

## 2025-06-02 ENCOUNTER — CLINICAL SUPPORT (OUTPATIENT)
Dept: REHABILITATION | Facility: HOSPITAL | Age: 20
End: 2025-06-02
Payer: COMMERCIAL

## 2025-06-02 DIAGNOSIS — M25.521 CHRONIC ELBOW PAIN, RIGHT: ICD-10-CM

## 2025-06-02 DIAGNOSIS — G89.29 CHRONIC ELBOW PAIN, RIGHT: ICD-10-CM

## 2025-06-02 DIAGNOSIS — Z78.9 IMPAIRED MOTOR CONTROL: ICD-10-CM

## 2025-06-02 DIAGNOSIS — M62.81 MUSCLE WEAKNESS OF RIGHT UPPER EXTREMITY: Primary | ICD-10-CM

## 2025-06-02 PROCEDURE — 97530 THERAPEUTIC ACTIVITIES: CPT

## 2025-06-02 PROCEDURE — 97112 NEUROMUSCULAR REEDUCATION: CPT

## 2025-06-02 PROCEDURE — 97110 THERAPEUTIC EXERCISES: CPT

## 2025-06-04 ENCOUNTER — ATHLETIC TRAINING SESSION (OUTPATIENT)
Dept: SPORTS MEDICINE | Facility: CLINIC | Age: 20
End: 2025-06-04
Payer: COMMERCIAL

## 2025-06-04 DIAGNOSIS — M25.521 RIGHT ELBOW PAIN: Primary | ICD-10-CM

## 2025-06-05 ENCOUNTER — CLINICAL SUPPORT (OUTPATIENT)
Dept: REHABILITATION | Facility: HOSPITAL | Age: 20
End: 2025-06-05
Payer: COMMERCIAL

## 2025-06-05 DIAGNOSIS — Z78.9 IMPAIRED MOTOR CONTROL: ICD-10-CM

## 2025-06-05 DIAGNOSIS — M25.521 CHRONIC ELBOW PAIN, RIGHT: ICD-10-CM

## 2025-06-05 DIAGNOSIS — M62.81 MUSCLE WEAKNESS OF RIGHT UPPER EXTREMITY: Primary | ICD-10-CM

## 2025-06-05 DIAGNOSIS — G89.29 CHRONIC ELBOW PAIN, RIGHT: ICD-10-CM

## 2025-06-05 PROCEDURE — 97112 NEUROMUSCULAR REEDUCATION: CPT

## 2025-06-05 PROCEDURE — 97110 THERAPEUTIC EXERCISES: CPT

## 2025-06-05 PROCEDURE — 97530 THERAPEUTIC ACTIVITIES: CPT

## 2025-06-10 ENCOUNTER — CLINICAL SUPPORT (OUTPATIENT)
Dept: REHABILITATION | Facility: HOSPITAL | Age: 20
End: 2025-06-10
Payer: COMMERCIAL

## 2025-06-10 DIAGNOSIS — M62.81 MUSCLE WEAKNESS OF RIGHT UPPER EXTREMITY: Primary | ICD-10-CM

## 2025-06-10 DIAGNOSIS — M25.521 CHRONIC ELBOW PAIN, RIGHT: ICD-10-CM

## 2025-06-10 DIAGNOSIS — Z78.9 IMPAIRED MOTOR CONTROL: ICD-10-CM

## 2025-06-10 DIAGNOSIS — G89.29 CHRONIC ELBOW PAIN, RIGHT: ICD-10-CM

## 2025-06-10 PROCEDURE — 97112 NEUROMUSCULAR REEDUCATION: CPT

## 2025-06-10 PROCEDURE — 97110 THERAPEUTIC EXERCISES: CPT

## 2025-06-10 PROCEDURE — 97750 PHYSICAL PERFORMANCE TEST: CPT

## 2025-06-10 NOTE — PROGRESS NOTES
[facility]   Physical Therapy Treatment Note      Name: Ammon Jones  [facility] Number: [id number]    Therapy Diagnosis:   Encounter Diagnoses   Name Primary?    Muscle weakness of right upper extremity Yes    Chronic elbow pain, right     Impaired motor control      Physician: Sarmad Prince DO    Visit Date: 6/10/2025    Physician Orders: PT Eval and Treat   Medical Diagnosis from Referral: G56.21 (ICD-10-CM) - Lesion of right ulnar nerve   Evaluation Date: 4/10/2025  Authorization Period Expiration: 3/27/26  Plan of Care Expiration: 08/01/2025  Progress Note Due: 06/01/25  Visit # / Visits authorized: 18 / 20   FOTO: 1/N     Precautions: Standard     Time In: 0903  Time Out: 0900  Total Billable Time: 57 minutes    DOS: 3/31/25     PROCEDURES PERFORMED:   Right elbow open ulnar nerve decompression with anterior subfascial transposition (CPT 54864)     POSTOPERATIVE PLAN: The postoperative rehab protocol we will be according to the below stated program.  Patient will remain in his postoperative immobilization splint until 4/10.  At that time he will be converted to a hinged elbow brace.  We will start physical therapy after that appointment.  We have discussed a 4 to 6-month timeframe for return to competitive throwing.     The patient will wear a splint for the 1st week following surgery and then will wear a hinged brace for the next 2 weeks. For the first 2 weeks the elbow will not be allowed to straighten past 20 degrees in order to maintain the new position of the ulnar nerve. Full range of motion is expected at 3 weeks post op. For throwers the expected timeline to return to playing light catch is 6 weeks following surgery.  **Residual numbness and tingling and/or weakness may lead physician to modify the progression through the rehab protocol.     Week 1-2 Post Op  -Block Extension past 20 degrees (0 deg is straight), Flexion to patients tolerance  -Patient may begin light  rotator cuff exercises including: IR/ER with theraband,  -Standing flexion/scaption (maintain 20 degrees elbow extension), scapular exercises and postural education  -Light forearm isometrics for wrist flexion and extension  -Active wrist/finger exercises  -Light gripping exercises  Week 3-4 Post Op  -Full elbow extension expected at week 3  -Progression of rotator cuff and scapular strengthening program  -Resisted forearm and wrist strengthening  -Light biceps and triceps strengthening  -Closed Chain shoulder strengthening with DS2 Platform  -Throwers begin scapular posture training ( prone T, W, V exercises, standing scapular posing, etc)  Week 5-6 Post Op  -Progressive resistive exercises including bicep, tricep, deltoid, trap, pectoralis to restore normal strength  -Following week 6 athlete may begin interval throwing/hitting program if strength, ROM, and healing allow. (Begin only after cleared by Dr. Waldron)    Subjective     Pt reports: Arm is feeling good.   .  He was compliant with home exercise program.  Response to previous treatment: Ongoing  Functional change: Ongoing    Pain: 1/10  Location: right elbow      Objective      Objective Measures updated at progress report unless specified.     5/12/25      Strength:(lbs via HHD)   Right Left   Scaption 25.9 22.6   Shoulder ER at side 28.2 26.2   Shoulder IR at side  28.5 26.9   Shoulder ER at 90-90 28.4 27.4   Shoulder IR at 90-90 28.0 27.1   Serratus Anterior/Upward rotation & protraction     Middle Trap 4-/5 3/5   Lower Trap 4-/5 3/5      Strength: (ASIF Dynamometer in lbs.) Average 3 trials, Position II:     Left Right   80-80 3 Jaw Madhu  51# 55#     6/10/25      Strength:(lbs via HHD)   Right Left   Scaption 28.9 20.7   Shoulder ER at side 34.5 28.5   Shoulder IR at side  33.9 35.1   Shoulder ER at 90-90 38.6 36.3   Shoulder IR at 90-90 34.2 34.4   Serratus Anterior/Upward rotation & protraction 5/5 4/5   Middle Trap 4-/5 3/5   Lower Trap 4-/5  "3/5      Strength: (ASIF Dynamometer in lbs.) Average 3 trials, Position II:     Left Right   80-80 3 Jaw Madhu  68# 70#     PSET (Reps)  R: 44  L: 26    Treatment     Ammon received the treatments listed below:      Physical Perf Test Performed to test strength of R UE compared to uninvolved L UE for 15 m        therapeutic exercises to develop strength, endurance, ROM, and flexibility for 23 minutes including:  Rower Lvl 6 x500m  Prisoner's warm up  90-90 ER/IR OTB 3x5 ea.       Not performed:  1080 Tricep ext to lat pull 5 Kg / 8 Kg 3x12   10# Scaption 3x12     manual therapy techniques:  were applied for 00 minutes, including:    neuromuscular re-education activities to improve: Coordination, Kinesthetic, Sense, Proprioception, Posture, and Motor Conrolt for 18 minutes. The following activities were included:  Prone MT re-ed 3x6x10" Lvl 3  Prone LT Re-ed 3x6x10" Lvl 3      Not performed:   1080 SA press 3x12 5Kg / 8Kg  SL RDL w/15# cable Row 3x10 ea.   D2 Halo w/5# [place] club 3x8 ea.   1080 90-90 ER 6 KG/8Kg # 3x12  8" Lat SLSD w/anti-pronation supination hold 3x6 ea.   1080 ER 6Kg/ 9Kg 3x12   90-90 ER eccentric ball catch 3x12   Prone T quick catch 3x to fatigue   1080 D2 3 Kg/4 Kg 3x12     therapeutic activities to improve functional performance for 00  minutes, including:      Not performed:   20# Plate FDS carries <-> 10 yards 3x   4# MB plyo series  90-90 Wall ball bounce 1 Kg 5x to burn  Towel drill x25   15# Globe carry Flor march <-> 30 ft 3x           Patient Education and Home Exercises       Education provided:   - Biomechanics and principles of proximal stability for distal control     Written Home Exercises Provided: yes. Exercises were reviewed and Ammon was able to demonstrate them prior to the end of the session.  Ammon demonstrated good  understanding of the education provided. See EMR under Patient Instructions for exercises provided during therapy sessions    Assessment     Updated " physical performance testing including isometric strength and PSET testing performed today with objective results as above. Ammon has made considerable improvements in periscapular strength, FDS strength, and posterior shoulder endurance. He is appropriate to initiate an interval throwing program which will be initiated next tx. He still needs to improve his LT and MT endurance which we will continue to focus on.      Ammon Is progressing well towards his goals.   Pt prognosis is Excellent.     Pt will continue to benefit from skilled outpatient physical therapy to address the deficits listed in the problem list box on initial evaluation, provide pt/family education and to maximize pt's level of independence in the home and community environment.     Pt's spiritual, cultural and educational needs considered and pt agreeable to plan of care and goals.     Anticipated barriers to physical therapy: None    Goals:     Short Term Goals: 2-4 weeks  1. Pt will be compliant with HEP 50% of prescribed amount. (Met)  2. The pt to demo improvement in R Elbow ROM equal to uninvolved side (Met)  3.  Pt will improve FOTO score to meet or exceed MCID (Met)  4. 3-jaw kaden  strength 70% LSI (Met)     Long Term Goals: 12 weeks   Pt will be compliant with % of prescribed amount. (Met)  Pt will demo 110% LSI strength via HHD for shoulder girdle R, ER:IR 70%, ER:BW: 20% (Met)  Pt will progress through UE plyo and interval throwing program   The pt will report full participation in ADLs and IADLs without restrictions related to R UE.     Plan     Outpatient Physical Therapy 2 times weekly for 12 weeks to include the following interventions: Electrical Stimulation  , Manual Therapy, Moist Heat/ Ice, Neuromuscular Re-ed, Patient Education, Self Care, Therapeutic Activities, Therapeutic Exercise, and Dry Needling .     Atif Nichole, PT , DPT  Board Certified in Sports Physical Therapy

## 2025-06-12 ENCOUNTER — CLINICAL SUPPORT (OUTPATIENT)
Dept: REHABILITATION | Facility: HOSPITAL | Age: 20
End: 2025-06-12
Payer: COMMERCIAL

## 2025-06-12 DIAGNOSIS — M25.521 CHRONIC ELBOW PAIN, RIGHT: ICD-10-CM

## 2025-06-12 DIAGNOSIS — G89.29 CHRONIC ELBOW PAIN, RIGHT: ICD-10-CM

## 2025-06-12 DIAGNOSIS — M62.81 MUSCLE WEAKNESS OF RIGHT UPPER EXTREMITY: Primary | ICD-10-CM

## 2025-06-12 DIAGNOSIS — Z78.9 IMPAIRED MOTOR CONTROL: ICD-10-CM

## 2025-06-12 PROCEDURE — 97110 THERAPEUTIC EXERCISES: CPT

## 2025-06-12 PROCEDURE — 97112 NEUROMUSCULAR REEDUCATION: CPT

## 2025-06-12 PROCEDURE — 97530 THERAPEUTIC ACTIVITIES: CPT

## 2025-06-12 NOTE — PROGRESS NOTES
[facility]   Physical Therapy Treatment Note      Name: Ammon Jones  [facility] Number: [id number]    Therapy Diagnosis:   Encounter Diagnoses   Name Primary?    Muscle weakness of right upper extremity Yes    Chronic elbow pain, right     Impaired motor control      Physician: Sarmad Prince DO    Visit Date: 6/12/2025    Physician Orders: PT Eval and Treat   Medical Diagnosis from Referral: G56.21 (ICD-10-CM) - Lesion of right ulnar nerve   Evaluation Date: 4/10/2025  Authorization Period Expiration: 3/27/26  Plan of Care Expiration: 08/01/2025  Progress Note Due: 06/01/25  Visit # / Visits authorized: 19 / 20   FOTO: 1/N     Precautions: Standard     Time In: 0900  Time Out: 1007  Total Billable Time: 61 minutes    DOS: 3/31/25     PROCEDURES PERFORMED:   Right elbow open ulnar nerve decompression with anterior subfascial transposition (CPT 12927)     POSTOPERATIVE PLAN: The postoperative rehab protocol we will be according to the below stated program.  Patient will remain in his postoperative immobilization splint until 4/10.  At that time he will be converted to a hinged elbow brace.  We will start physical therapy after that appointment.  We have discussed a 4 to 6-month timeframe for return to competitive throwing.     The patient will wear a splint for the 1st week following surgery and then will wear a hinged brace for the next 2 weeks. For the first 2 weeks the elbow will not be allowed to straighten past 20 degrees in order to maintain the new position of the ulnar nerve. Full range of motion is expected at 3 weeks post op. For throwers the expected timeline to return to playing light catch is 6 weeks following surgery.  **Residual numbness and tingling and/or weakness may lead physician to modify the progression through the rehab protocol.     Week 1-2 Post Op  -Block Extension past 20 degrees (0 deg is straight), Flexion to patients tolerance  -Patient may begin light  rotator cuff exercises including: IR/ER with theraband,  -Standing flexion/scaption (maintain 20 degrees elbow extension), scapular exercises and postural education  -Light forearm isometrics for wrist flexion and extension  -Active wrist/finger exercises  -Light gripping exercises  Week 3-4 Post Op  -Full elbow extension expected at week 3  -Progression of rotator cuff and scapular strengthening program  -Resisted forearm and wrist strengthening  -Light biceps and triceps strengthening  -Closed Chain shoulder strengthening with DS2 Platform  -Throwers begin scapular posture training ( prone T, W, V exercises, standing scapular posing, etc)  Week 5-6 Post Op  -Progressive resistive exercises including bicep, tricep, deltoid, trap, pectoralis to restore normal strength  -Following week 6 athlete may begin interval throwing/hitting program if strength, ROM, and healing allow. (Begin only after cleared by Dr. Waldron)    Subjective     Pt reports: Arm is feeling good. Feels ready to throw. No issues with  plyometrics or towel throwing.   .  He was compliant with home exercise program.  Response to previous treatment: Ongoing  Functional change: Ongoing    Pain: 1/10  Location: right elbow      Objective      Objective Measures updated at progress report unless specified.     5/12/25      Strength:(lbs via HHD)   Right Left   Scaption 25.9 22.6   Shoulder ER at side 28.2 26.2   Shoulder IR at side  28.5 26.9   Shoulder ER at 90-90 28.4 27.4   Shoulder IR at 90-90 28.0 27.1   Serratus Anterior/Upward rotation & protraction     Middle Trap 4-/5 3/5   Lower Trap 4-/5 3/5      Strength: (ASIF Dynamometer in lbs.) Average 3 trials, Position II:     Left Right   80-80 3 Jaw Madhu  51# 55#     6/10/25      Strength:(lbs via HHD)   Right Left   Scaption 28.9 20.7   Shoulder ER at side 34.5 28.5   Shoulder IR at side  33.9 35.1   Shoulder ER at 90-90 38.6 36.3   Shoulder IR at 90-90 34.2 34.4   Serratus Anterior/Upward  "rotation & protraction 5/5 4/5   Middle Trap 4-/5 3/5   Lower Trap 4-/5 3/5      Strength: (ASIF Dynamometer in lbs.) Average 3 trials, Position II:     Left Right   80-80 3 Jaw Madhu  68# 70#     PSET (Reps)  R: 44  L: 26    Treatment     Ammon received the treatments listed below:        therapeutic exercises to develop strength, endurance, ROM, and flexibility for 18 minutes including:  Rower Lvl 6 x500m  Prisoner's warm up  90-90 ER/IR OTB 3x5 ea.       Not performed:  1080 Tricep ext to lat pull 5 Kg / 8 Kg 3x12   10# Scaption 3x12     manual therapy techniques:  were applied for 00 minutes, including:    neuromuscular re-education activities to improve: Coordination, Kinesthetic, Sense, Proprioception, Posture, and Motor Conrolt for 18 minutes. The following activities were included:  Head behind hips w/band resist 3x10  Ball glove separation drill x30       Not performed:   1080 SA press 3x12 5Kg / 8Kg  SL RDL w/15# cable Row 3x10 ea.   D2 Halo w/5#  club 3x8 ea.   1080 90-90 ER 6 KG/8Kg # 3x12  8" Lat SLSD w/anti-pronation supination hold 3x6 ea.   1080 ER 6Kg/ 9Kg 3x12   90-90 ER eccentric ball catch 3x12   Prone T quick catch 3x to fatigue   1080 D2 3 Kg/4 Kg 3x12   Prone MT re-ed 3x6x10" Lvl 3  Prone LT Re-ed 3x6x10" Lvl 3    therapeutic activities to improve functional performance for 25  minutes, including:  Towel drill x25   4# MB plyo series x1 round  90-90 Wall ball bounce 1 Kg 2x to burn    60 ft tossing in gym with 2D video analysis  2x25      Not performed:   20# Plate FDS carries <-> 10 yards 3x   15# Globe carry Flor march <-> 30 ft 3x           Patient Education and Home Exercises       Education provided:   - Biomechanics and principles of proximal stability for distal control     Written Home Exercises Provided: yes. Exercises were reviewed and Ammon was able to demonstrate them prior to the end of the session.  Ammon demonstrated good  understanding of the education provided. " See EMR under Patient Instructions for exercises provided during therapy sessions    Assessment     Initiated short tossing today to 60 ft which was tolerated pain free. Good biomechanics with mild fault with not getting head behind hips and occasionally his hand not getting on top of the ball. Otherwise is doing well. Initiated corrective drills to address this and instructed in short toss program to be performed independently every other day for the next week.      Ammon Is progressing well towards his goals.   Pt prognosis is Excellent.     Pt will continue to benefit from skilled outpatient physical therapy to address the deficits listed in the problem list box on initial evaluation, provide pt/family education and to maximize pt's level of independence in the home and community environment.     Pt's spiritual, cultural and educational needs considered and pt agreeable to plan of care and goals.     Anticipated barriers to physical therapy: None    Goals:     Short Term Goals: 2-4 weeks  1. Pt will be compliant with HEP 50% of prescribed amount. (Met)  2. The pt to demo improvement in R Elbow ROM equal to uninvolved side (Met)  3.  Pt will improve FOTO score to meet or exceed MCID (Met)  4. 3-jaw kadne  strength 70% LSI (Met)     Long Term Goals: 12 weeks   Pt will be compliant with % of prescribed amount. (Met)  Pt will demo 110% LSI strength via HHD for shoulder girdle R, ER:IR 70%, ER:BW: 20% (Met)  Pt will progress through UE plyo and interval throwing program   The pt will report full participation in ADLs and IADLs without restrictions related to R UE.     Plan     Outpatient Physical Therapy 2 times weekly for 12 weeks to include the following interventions: Electrical Stimulation  , Manual Therapy, Moist Heat/ Ice, Neuromuscular Re-ed, Patient Education, Self Care, Therapeutic Activities, Therapeutic Exercise, and Dry Needling .     Atif Nichole, PT , DPT  Board Certified in Sports Physical  Therapy

## 2025-06-16 ENCOUNTER — ATHLETIC TRAINING SESSION (OUTPATIENT)
Dept: SPORTS MEDICINE | Facility: CLINIC | Age: 20
End: 2025-06-16
Payer: COMMERCIAL

## 2025-06-16 ENCOUNTER — CLINICAL SUPPORT (OUTPATIENT)
Dept: REHABILITATION | Facility: HOSPITAL | Age: 20
End: 2025-06-16
Payer: COMMERCIAL

## 2025-06-16 DIAGNOSIS — M25.521 CHRONIC ELBOW PAIN, RIGHT: ICD-10-CM

## 2025-06-16 DIAGNOSIS — M62.81 MUSCLE WEAKNESS OF RIGHT UPPER EXTREMITY: Primary | ICD-10-CM

## 2025-06-16 DIAGNOSIS — G89.29 CHRONIC ELBOW PAIN, RIGHT: ICD-10-CM

## 2025-06-16 DIAGNOSIS — M25.521 RIGHT ELBOW PAIN: Primary | ICD-10-CM

## 2025-06-16 DIAGNOSIS — Z78.9 IMPAIRED MOTOR CONTROL: ICD-10-CM

## 2025-06-16 PROCEDURE — 97530 THERAPEUTIC ACTIVITIES: CPT

## 2025-06-16 PROCEDURE — 97110 THERAPEUTIC EXERCISES: CPT

## 2025-06-16 PROCEDURE — 97112 NEUROMUSCULAR REEDUCATION: CPT

## 2025-06-16 NOTE — PROGRESS NOTES
[facility]   Physical Therapy Treatment Note      Name: Ammon Jones  [facility] Number: [id number]    Therapy Diagnosis:   Encounter Diagnoses   Name Primary?    Muscle weakness of right upper extremity Yes    Chronic elbow pain, right     Impaired motor control      Physician: Sarmad Prince DO    Visit Date: 6/16/2025    Physician Orders: PT Eval and Treat   Medical Diagnosis from Referral: G56.21 (ICD-10-CM) - Lesion of right ulnar nerve   Evaluation Date: 4/10/2025  Authorization Period Expiration: 3/27/26  Plan of Care Expiration: 08/01/2025  Progress Note Due: 06/01/25  Visit # / Visits authorized: 20 / 20   FOTO: 1/N     Precautions: Standard     Time In: 1100  Time Out: 1159  Total Billable Time: 59 minutes    DOS: 3/31/25     PROCEDURES PERFORMED:   Right elbow open ulnar nerve decompression with anterior subfascial transposition (CPT 32168)     POSTOPERATIVE PLAN: The postoperative rehab protocol we will be according to the below stated program.  Patient will remain in his postoperative immobilization splint until 4/10.  At that time he will be converted to a hinged elbow brace.  We will start physical therapy after that appointment.  We have discussed a 4 to 6-month timeframe for return to competitive throwing.     The patient will wear a splint for the 1st week following surgery and then will wear a hinged brace for the next 2 weeks. For the first 2 weeks the elbow will not be allowed to straighten past 20 degrees in order to maintain the new position of the ulnar nerve. Full range of motion is expected at 3 weeks post op. For throwers the expected timeline to return to playing light catch is 6 weeks following surgery.  **Residual numbness and tingling and/or weakness may lead physician to modify the progression through the rehab protocol.     Week 1-2 Post Op  -Block Extension past 20 degrees (0 deg is straight), Flexion to patients tolerance  -Patient may begin light  rotator cuff exercises including: IR/ER with theraband,  -Standing flexion/scaption (maintain 20 degrees elbow extension), scapular exercises and postural education  -Light forearm isometrics for wrist flexion and extension  -Active wrist/finger exercises  -Light gripping exercises  Week 3-4 Post Op  -Full elbow extension expected at week 3  -Progression of rotator cuff and scapular strengthening program  -Resisted forearm and wrist strengthening  -Light biceps and triceps strengthening  -Closed Chain shoulder strengthening with DS2 Platform  -Throwers begin scapular posture training ( prone T, W, V exercises, standing scapular posing, etc)  Week 5-6 Post Op  -Progressive resistive exercises including bicep, tricep, deltoid, trap, pectoralis to restore normal strength  -Following week 6 athlete may begin interval throwing/hitting program if strength, ROM, and healing allow. (Begin only after cleared by Dr. Waldron)    Subjective     Pt reports: Arm is feeling good with throwing. Has been working on his biomechanics corrections.   .  He was compliant with home exercise program.  Response to previous treatment: Ongoing  Functional change: Ongoing    Pain: 0/10  Location: right elbow      Objective      Objective Measures updated at progress report unless specified.     5/12/25      Strength:(lbs via HHD)   Right Left   Scaption 25.9 22.6   Shoulder ER at side 28.2 26.2   Shoulder IR at side  28.5 26.9   Shoulder ER at 90-90 28.4 27.4   Shoulder IR at 90-90 28.0 27.1   Serratus Anterior/Upward rotation & protraction     Middle Trap 4-/5 3/5   Lower Trap 4-/5 3/5      Strength: (ASIF Dynamometer in lbs.) Average 3 trials, Position II:     Left Right   80-80 3 Jaw Madhu  51# 55#     6/10/25      Strength:(lbs via HHD)   Right Left   Scaption 28.9 20.7   Shoulder ER at side 34.5 28.5   Shoulder IR at side  33.9 35.1   Shoulder ER at 90-90 38.6 36.3   Shoulder IR at 90-90 34.2 34.4   Serratus Anterior/Upward rotation &  "protraction 5/5 4/5   Middle Trap 4-/5 3/5   Lower Trap 4-/5 3/5      Strength: (ASIF Dynamometer in lbs.) Average 3 trials, Position II:     Left Right   80-80 3 Jaw Madhu  68# 70#     PSET (Reps)  R: 44  L: 26    Treatment     Ammon received the treatments listed below:        therapeutic exercises to develop strength, endurance, ROM, and flexibility for 18 minutes including:  Rower Lvl 6 x500m  Prisoner's warm up  90-90 ER/IR OTB 3x5 ea.       Not performed:  1080 Tricep ext to lat pull 5 Kg / 8 Kg 3x12   10# Scaption 3x12     manual therapy techniques:  were applied for 00 minutes, including:    neuromuscular re-education activities to improve: Coordination, Kinesthetic, Sense, Proprioception, Posture, and Motor Conrolt for 16 minutes. The following activities were included:  SL RDL 25# 3x8  YTB Abd walk <-> 25 ft 2x       Not performed:   1080 SA press 3x12 5Kg / 8Kg  SL RDL w/15# cable Row 3x10 ea.   D2 Halo w/5# [place] club 3x8 ea.   1080 90-90 ER 6 KG/8Kg # 3x12  8" Lat SLSD w/anti-pronation supination hold 3x6 ea.   1080 ER 6Kg/ 9Kg 3x12   90-90 ER eccentric ball catch 3x12   Prone T quick catch 3x to fatigue   1080 D2 3 Kg/4 Kg 3x12   Prone MT re-ed 3x6x10" Lvl 3  Prone LT Re-ed 3x6x10" Lvl 3    therapeutic activities to improve functional performance for 25  minutes, including:  Towel drill x25     60 ft tossing in gym with 2D video analysis  2x25      Not performed:   20# Plate FDS carries <-> 10 yards 3x   15# Globe carry Flor march <-> 30 ft 3x           Patient Education and Home Exercises       Education provided:   - Biomechanics and principles of proximal stability for distal control     Written Home Exercises Provided: yes. Exercises were reviewed and Ammon was able to demonstrate them prior to the end of the session.  Ammon demonstrated good  understanding of the education provided. See EMR under Patient Instructions for exercises provided during therapy sessions    Assessment "     Re-assessment of throwing mechanics showed improvement on previously identified fault of not getting head behind hips and falling toward target. Pt instructed to continue at current 60 ft distance to reinforce biomechanics correction. Will plan to begin progressing distance/volume in 1 week.      Ammon Is progressing well towards his goals.   Pt prognosis is Excellent.     Pt will continue to benefit from skilled outpatient physical therapy to address the deficits listed in the problem list box on initial evaluation, provide pt/family education and to maximize pt's level of independence in the home and community environment.     Pt's spiritual, cultural and educational needs considered and pt agreeable to plan of care and goals.     Anticipated barriers to physical therapy: None    Goals:     Short Term Goals: 2-4 weeks  1. Pt will be compliant with HEP 50% of prescribed amount. (Met)  2. The pt to demo improvement in R Elbow ROM equal to uninvolved side (Met)  3.  Pt will improve FOTO score to meet or exceed MCID (Met)  4. 3-jaw kaden  strength 70% LSI (Met)     Long Term Goals: 12 weeks   Pt will be compliant with % of prescribed amount. (Met)  Pt will demo 110% LSI strength via HHD for shoulder girdle R, ER:IR 70%, ER:BW: 20% (Met)  Pt will progress through UE plyo and interval throwing program   The pt will report full participation in ADLs and IADLs without restrictions related to R UE.     Plan     Outpatient Physical Therapy 2 times weekly for 12 weeks to include the following interventions: Electrical Stimulation  , Manual Therapy, Moist Heat/ Ice, Neuromuscular Re-ed, Patient Education, Self Care, Therapeutic Activities, Therapeutic Exercise, and Dry Needling .     Atif Nichole, PT , DPT  Board Certified in Sports Physical Therapy

## 2025-06-16 NOTE — PROGRESS NOTES
Reason for Encounter Follow-Up    Subjective:       Chief Complaint: Ammon Jones is a 20 y.o. male student at [employer] who had concerns including Injury of the Right Elbow.    Ammon is a  at [employer]. He injured his ulnar nerve while pitching for [employer] in March. He had surgery to relocate his ulnar nerve on 03/31/2025. Ammon is coming in over the summer to continue doing rehab and return to throwing.     Handedness: right-handed  Sport played: baseball      Level: college      Position:pitcher      Injury        ROS              Objective:       General: Ammon is well-developed, well-nourished, appears stated age, in no acute distress, alert and oriented to time, place and person.     AT Session          Assessment:     Status: O - Out    Date Seen: 06/06/2025    Date of Injury: 03/14/2025    Date Out: 03/14/2025    Date Cleared: NA        Treatment/Rehab/Maintenance:     Ammon completed therapeutic exercises to develop strength and ROM:    Date: 06/06/2025    Exercises  Sets x Reps  Weight   UE nerve flosses  2 x 10     Shoulder CARs  2 x 10     Shoulder drivers  3 x 10  Red theraband    Wrist flexion  3 x 12  5 lbs    Wrist extension  3 x 12  5 lbs    Pronation/supination  4 x 12  4 lbs    Rice bucket  1                   Manual shoulder stretch series by AT  Modality: Massage  Date: 06/06/2025 Body Part: R arm Duration: 10 min   Modality: Cupping  Date: 06/06/2025 Body Part: Back Duration: 10 min          Plan:       1. Continue to come in to the [facility] for rehab on days he does not go to PT. Follow return to throwing program.   2. Physician Referral: yes  3. ED Referral:no  4. Parent/Guardian Notified: No  5. All questions were answered, ath. will contact me for questions or concerns in  the interim.  6.         Eligible to use School Insurance: Yes

## 2025-06-16 NOTE — PROGRESS NOTES
Reason for Encounter Follow-Up    Subjective:       Chief Complaint: Ammon Jones is a 20 y.o. male student at [employer] who had concerns including Injury of the Right Elbow.    Ammon is a  at [employer]. He injured his ulnar nerve while pitching for [employer] in March. He had surgery to relocate his ulnar nerve on 03/31/2025. Ammon is coming in over the summer to continue doing rehab and return to throwing.     Handedness: right-handed  Sport played: baseball      Level: college      Position:pitcher      Injury        ROS              Objective:       General: Ammon is well-developed, well-nourished, appears stated age, in no acute distress, alert and oriented to time, place and person.     AT Session          Assessment:     Status: O - Out    Date Seen: 06/09/2025    Date of Injury: 03/14/2025    Date Out: 03/14/2025    Date Cleared: NA        Treatment/Rehab/Maintenance:     Ammon completed therapeutic exercises to develop strength and ROM:    Date: 06/09/2025    Exercises  Sets x Reps  Weight   UE nerve flosses  2 x 10     Wrist stretches  2 x 30s     Shoulder CARs  2 x 10     Face pulls  3 x 10  Orange heavy resistance band   Plank taps  3 x 8     ER pulses  3 x 4  Red theraband    Bottoms up KB press  3 x 10  10 lbs    Rice bucket 1               Manual shoulder stretch series by AT  Modality: Massage  Date: 06/09/2025 Body Part: R arm Duration: 10 min         Plan:       1. Continue to come in to the [facility] for rehab on days he does not go to PT. Follow return to throwing program.   2. Physician Referral: yes  3. ED Referral:no  4. Parent/Guardian Notified: No  5. All questions were answered, ath. will contact me for questions or concerns in  the interim.  6.         Eligible to use School Insurance: Yes

## 2025-06-24 ENCOUNTER — CLINICAL SUPPORT (OUTPATIENT)
Dept: REHABILITATION | Facility: HOSPITAL | Age: 20
End: 2025-06-24
Payer: COMMERCIAL

## 2025-06-24 DIAGNOSIS — Z78.9 IMPAIRED MOTOR CONTROL: ICD-10-CM

## 2025-06-24 DIAGNOSIS — G89.29 CHRONIC ELBOW PAIN, RIGHT: ICD-10-CM

## 2025-06-24 DIAGNOSIS — M62.81 MUSCLE WEAKNESS OF RIGHT UPPER EXTREMITY: Primary | ICD-10-CM

## 2025-06-24 DIAGNOSIS — M25.521 CHRONIC ELBOW PAIN, RIGHT: ICD-10-CM

## 2025-06-24 PROCEDURE — 97112 NEUROMUSCULAR REEDUCATION: CPT

## 2025-06-24 PROCEDURE — 97110 THERAPEUTIC EXERCISES: CPT

## 2025-06-24 NOTE — PROGRESS NOTES
[facility]   Physical Therapy Treatment Note      Name: Ammon Jones  [facility] Number: [id number]    Therapy Diagnosis:   Encounter Diagnoses   Name Primary?    Muscle weakness of right upper extremity Yes    Chronic elbow pain, right     Impaired motor control      Physician: Sarmad Prince DO    Visit Date: 6/24/2025    Physician Orders: PT Eval and Treat   Medical Diagnosis from Referral: G56.21 (ICD-10-CM) - Lesion of right ulnar nerve   Evaluation Date: 4/10/2025  Authorization Period Expiration: 3/27/26  Plan of Care Expiration: 08/01/2025  Progress Note Due: 06/01/25  Visit # / Visits authorized: 21 / 30   FOTO: 1/N     Precautions: Standard     Time In: 0935  Time Out: 1029  Total Billable Time: 54 minutes    DOS: 3/31/25     PROCEDURES PERFORMED:   Right elbow open ulnar nerve decompression with anterior subfascial transposition (CPT 63203)     POSTOPERATIVE PLAN: The postoperative rehab protocol we will be according to the below stated program.  Patient will remain in his postoperative immobilization splint until 4/10.  At that time he will be converted to a hinged elbow brace.  We will start physical therapy after that appointment.  We have discussed a 4 to 6-month timeframe for return to competitive throwing.     The patient will wear a splint for the 1st week following surgery and then will wear a hinged brace for the next 2 weeks. For the first 2 weeks the elbow will not be allowed to straighten past 20 degrees in order to maintain the new position of the ulnar nerve. Full range of motion is expected at 3 weeks post op. For throwers the expected timeline to return to playing light catch is 6 weeks following surgery.  **Residual numbness and tingling and/or weakness may lead physician to modify the progression through the rehab protocol.     Week 1-2 Post Op  -Block Extension past 20 degrees (0 deg is straight), Flexion to patients tolerance  -Patient may begin light  rotator cuff exercises including: IR/ER with theraband,  -Standing flexion/scaption (maintain 20 degrees elbow extension), scapular exercises and postural education  -Light forearm isometrics for wrist flexion and extension  -Active wrist/finger exercises  -Light gripping exercises  Week 3-4 Post Op  -Full elbow extension expected at week 3  -Progression of rotator cuff and scapular strengthening program  -Resisted forearm and wrist strengthening  -Light biceps and triceps strengthening  -Closed Chain shoulder strengthening with DS2 Platform  -Throwers begin scapular posture training ( prone T, W, V exercises, standing scapular posing, etc)  Week 5-6 Post Op  -Progressive resistive exercises including bicep, tricep, deltoid, trap, pectoralis to restore normal strength  -Following week 6 athlete may begin interval throwing/hitting program if strength, ROM, and healing allow. (Begin only after cleared by Dr. Waldron)    Subjective     Pt reports: Arm is feeling good with throwing. Has been working on his biomechanics corrections.   .  He was compliant with home exercise program.  Response to previous treatment: Ongoing  Functional change: Ongoing    Pain: 0/10  Location: right elbow      Objective      Objective Measures updated at progress report unless specified.     5/12/25      Strength:(lbs via HHD)   Right Left   Scaption 25.9 22.6   Shoulder ER at side 28.2 26.2   Shoulder IR at side  28.5 26.9   Shoulder ER at 90-90 28.4 27.4   Shoulder IR at 90-90 28.0 27.1   Serratus Anterior/Upward rotation & protraction     Middle Trap 4-/5 3/5   Lower Trap 4-/5 3/5      Strength: (ASIF Dynamometer in lbs.) Average 3 trials, Position II:     Left Right   80-80 3 Jaw Madhu  51# 55#     6/10/25      Strength:(lbs via HHD)   Right Left   Scaption 28.9 20.7   Shoulder ER at side 34.5 28.5   Shoulder IR at side  33.9 35.1   Shoulder ER at 90-90 38.6 36.3   Shoulder IR at 90-90 34.2 34.4   Serratus Anterior/Upward rotation &  "protraction 5/5 4/5   Middle Trap 4-/5 3/5   Lower Trap 4-/5 3/5      Strength: (ASIF Dynamometer in lbs.) Average 3 trials, Position II:     Left Right   80-80 3 Jaw Madhu  68# 70#     PSET (Reps)  R: 44  L: 26    Treatment     Ammon received the treatments listed below:        therapeutic exercises to develop strength, endurance, ROM, and flexibility for 24 minutes including:  Rower Lvl 6 x500m  ANCOR ER 10# 3x12  10# Scaption 3x15     Pt education: Throwing program and general soreness rules      Not performed:  1080 Tricep ext to lat pull 5 Kg / 8 Kg 3x12     manual therapy techniques:  were applied for 00 minutes, including:    neuromuscular re-education activities to improve: Coordination, Kinesthetic, Sense, Proprioception, Posture, and Motor Conrolt for 30 minutes. The following activities were included:  8" Lat SLSD w/anti-pronation supination hold 3x6 ea. 5#  ANCOR 90-90 ER 10# 3x9   Prone Y 5# 3x12   ANCOR SA Press 20# 3x12       Not performed:   1080 SA press 3x12 5Kg / 8Kg  SL RDL w/15# cable Row 3x10 ea.   D2 Halo w/5# Belgian club 3x8 ea.   1080 90-90 ER 6 KG/8Kg # 3x12  1080 ER 6Kg/ 9Kg 3x12   90-90 ER eccentric ball catch 3x12   Prone T quick catch 3x to fatigue   1080 D2 3 Kg/4 Kg 3x12   SL RDL 25# 3x8  YTB Abd walk <-> 25 ft 2x     therapeutic activities to improve functional performance for 00  minutes, including:        Not performed:   20# Plate FDS carries <-> 10 yards 3x   15# Globe carry Flor march <-> 30 ft 3x           Patient Education and Home Exercises       Education provided:   - Biomechanics and principles of proximal stability for distal control     Written Home Exercises Provided: yes. Exercises were reviewed and Ammon was able to demonstrate them prior to the end of the session.  Ammon demonstrated good  understanding of the education provided. See EMR under Patient Instructions for exercises provided during therapy sessions    Assessment     Pt able to progress resistance " training for shoulder girdle with reps/resistance with appropriate control of the shoulder girdle. Pt provided progressive throwing program with specific soreness rules. Rounded with his ATC. Will continue to monitor.       Ammon Is progressing well towards his goals.   Pt prognosis is Excellent.     Pt will continue to benefit from skilled outpatient physical therapy to address the deficits listed in the problem list box on initial evaluation, provide pt/family education and to maximize pt's level of independence in the home and community environment.     Pt's spiritual, cultural and educational needs considered and pt agreeable to plan of care and goals.     Anticipated barriers to physical therapy: None    Goals:     Short Term Goals: 2-4 weeks  1. Pt will be compliant with HEP 50% of prescribed amount. (Met)  2. The pt to demo improvement in R Elbow ROM equal to uninvolved side (Met)  3.  Pt will improve FOTO score to meet or exceed MCID (Met)  4. 3-jaw kaden  strength 70% LSI (Met)     Long Term Goals: 12 weeks   Pt will be compliant with % of prescribed amount. (Met)  Pt will demo 110% LSI strength via HHD for shoulder girdle R, ER:IR 70%, ER:BW: 20% (Met)  Pt will progress through UE plyo and interval throwing program   The pt will report full participation in ADLs and IADLs without restrictions related to R UE.     Plan     Outpatient Physical Therapy 2 times weekly for 12 weeks to include the following interventions: Electrical Stimulation  , Manual Therapy, Moist Heat/ Ice, Neuromuscular Re-ed, Patient Education, Self Care, Therapeutic Activities, Therapeutic Exercise, and Dry Needling .     Atif Nichole, PT , DPT  Board Certified in Sports Physical Therapy

## 2025-06-27 ENCOUNTER — ATHLETIC TRAINING SESSION (OUTPATIENT)
Dept: SPORTS MEDICINE | Facility: CLINIC | Age: 20
End: 2025-06-27
Payer: COMMERCIAL

## 2025-06-27 DIAGNOSIS — M25.521 RIGHT ELBOW PAIN: Primary | ICD-10-CM

## 2025-06-27 NOTE — PROGRESS NOTES
Reason for Encounter Follow-Up    Subjective:       Chief Complaint: Ammon Jones is a 20 y.o. male student at [employer]) who had concerns including Injury of the Right Elbow.    Ammon is a  at [employer]. He injured his ulnar nerve while pitching for [employer] in March. He had surgery to relocate his ulnar nerve on 03/31/2025. Ammon is coming in over the summer to continue doing rehab and return to throwing.     Handedness: right-handed  Sport played: baseball      Level: college      Position:pitcher      Injury        ROS              Objective:       General: Ammon is well-developed, well-nourished, appears stated age, in no acute distress, alert and oriented to time, place and person.     AT Session          Assessment:     Status: O - Out    Date Seen: 06/22/2025-06/28/2025    Date of Injury: 03/14/2025    Date Out: 03/14/2025    Date Cleared: NA        Treatment/Rehab/Maintenance:     Ammon completed therapeutic exercises to develop strength and ROM:    Date: 06/23/2025    Exercises  Sets x Reps  Weight   UE nerve flosses  2 x 10     Wrist stretches  2 x 30s     Shoulder CARs  2 x 10     Face pulls  3 x 10  Orange heavy resistance band   Plank taps  3 x 8     ER pulses  3 x 4  Red theraband    Bottoms up KB press  3 x 10  10 lbs    Rice bucket 1               Modality: Cupping  Date: 06/23/2025 Body Part: Back Duration: 10 min   Modality: IASTM  Date: 06/23/2025 Body Part: Scar Duration: 5 min         Ammon completed therapeutic exercises to develop strength and ROM:    Date: 06/20/2025    Exercises  Sets x Reps  Weight   UE nerve flosses  2 x 10     Wrist stretches  2 x 30s     Wrist flexion  4 x 10  6 lbs    Wrist extension  4 x 10  6 lbs    Pronation/supination  4 x 12  8 lbs    Radial deviation  4 x 12  8 lbs    Ulnar deviation  4 x 12  8 lbs    Shoulder body blade  4 x 30s               Manual shoulder stretch series by AT  Modality: Massage  Date: 06/20/2025 Body Part:  Right arm and posterior shoulder Duration: 10 min         Plan:       1. Continue to come in to the [facility] for rehab on days he does not go to PT. Follow return to throwing program.   2. Physician Referral: yes  3. ED Referral:no  4. Parent/Guardian Notified: No  5. All questions were answered, ath. will contact me for questions or concerns in  the interim.  6.         Eligible to use School Insurance: Yes

## 2025-06-27 NOTE — PROGRESS NOTES
Reason for Encounter Follow-Up    Subjective:       Chief Complaint: Ammon Jones is a 20 y.o. male student at [employer]) who had concerns including Injury of the Right Elbow.    Ammon is a  at [employer]. He injured his ulnar nerve while pitching for [employer] in March. He had surgery to relocate his ulnar nerve on 03/31/2025. Ammon is coming in over the summer to continue doing rehab and return to throwing.     Handedness: right-handed  Sport played: baseball      Level: college      Position:pitcher      Injury        ROS              Objective:       General: Ammon is well-developed, well-nourished, appears stated age, in no acute distress, alert and oriented to time, place and person.     AT Session          Assessment:     Status: O - Out    Date Seen: 06/15/2025-06/21/2025    Date of Injury: 03/14/2025    Date Out: 03/14/2025    Date Cleared: NA        Treatment/Rehab/Maintenance:     Ammon completed therapeutic exercises to develop strength and ROM:    Date: 06/19/2025    Exercises  Sets x Reps  Weight   UE nerve flosses  2 x 10     Wrist stretches  2 x 30s     Shoulder CARs  2 x 10     Face pulls  3 x 10  Orange heavy resistance band   Plank taps  3 x 8     ER pulses  3 x 4  Red theraband    Bottoms up KB press  3 x 10  10 lbs    Rice bucket 1               Manual shoulder stretch series by AT  Modality: Massage  Date: 06/19/2025 Body Part: R arm Duration: 10 min     Ammon completed therapeutic exercises to develop strength and ROM:    Date: 06/18/2025    Exercises  Sets x Reps  Weight   UE nerve flosses  2 x 10     Wrist stretches  2 x 30s     Shoulder CARs  2 x 10     Face pulls  3 x 10  Orange heavy resistance band                                 Modality: Massage  Date: 06/18/2025 Body Part: R arm Duration: 10 min         Ammon completed therapeutic exercises to develop strength and ROM:    Date: 06/17/2025    Exercises  Sets x Reps  Weight   UE nerve flosses  2 x 10      Wrist stretches  2 x 30s     Wrist flexion  4 x 10  6 lbs    Wrist extension  4 x 10  6 lbs    Pronation/supination  4 x 12  8 lbs    Radial deviation  4 x 12  8 lbs    Ulnar deviation  4 x 12  8 lbs    Shoulder body blade  4 x 30s               Manual shoulder stretch series by AT  Modality: Massage  Date: 06/17/2025 Body Part: Right arm and posterior shoulder Duration: 10 min         Plan:       1. Continue to come in to the [facility] for rehab on days he does not go to PT. Follow return to throwing program.   2. Physician Referral: yes  3. ED Referral:no  4. Parent/Guardian Notified: No  5. All questions were answered, ath. will contact me for questions or concerns in  the interim.  6.         Eligible to use School Insurance: Yes

## 2025-06-27 NOTE — PROGRESS NOTES
CC: Right elbow post-op follow up     DATE OF PROCEDURE: 3/31/2025   PROCEDURES PERFORMED:   Right elbow open ulnar nerve decompression with anterior subfascial transposition (CPT 96260)    Ammon Jones presents today for follow up appointment of his right elbow. Patient is now 3 months status post above procedure. Continues PT at [facility].  He states the elbow feels good.  No complaints.  No ulnar nerve symptoms.  No popping.  He has been working through his throwing program for the last 3 weeks.  No symptoms or complaints.    Prior Hx 5/12/2025:   Ammon Jones reports to be doing well 6 weeks status post the above mentioned procedure. Continues PT at the [place] location with [facility].  Doing well.  He has no complaints.  No numbness or tingling.  No pain.  Anxious to begin throwing again.  Accompanied by his father today.    REVIEW OF SYSTEMS:   Constitution: Negative. Negative for chills, fever and night sweats.    Hematologic/Lymphatic: Negative for bleeding problem. Does not bruise/bleed easily.   Skin: Negative for dry skin, itching and rash.   Musculoskeletal: Negative for falls.  Negative for Right elbow pain and muscle weakness.     All other review of symptoms were reviewed and found to be noncontributory.     PAST MEDICAL HISTORY:   No past medical history on file.    PAST SURGICAL HISTORY:   Past Surgical History:   Procedure Laterality Date    ADENOIDECTOMY Bilateral     DECOMPRESSION OF NERVE Right 3/31/2025    Procedure: ULNAR DECOMPRESSION, NERVE;  Surgeon: STEFANI Waldron MD;  Location: [facility] OR;  Service: Orthopedics;  Laterality: Right;    TYMPANOSTOMY TUBE PLACEMENT Bilateral     ULNAR NERVE TRANSPOSITION Right 3/31/2025    Procedure: SUBCUTANEOUS TRANSPOSITION, NERVE, ULNAR;  Surgeon: STEFANI Waldron MD;  Location: [facility] OR;  Service: Orthopedics;  Laterality: Right;  General     FAMILY HISTORY:   Family History   Problem Relation Name Age of Onset    No Known Problems  "Mother      No Known Problems Father       SOCIAL HISTORY:   Social History[1]    MEDICATIONS:   Current Medications[2]    ALLERGIES:   Review of patient's allergies indicates:  No Known Allergies     PHYSICAL EXAMINATION:  /73 (Patient Position: Sitting)   Pulse 67   Ht 6' 5" (1.956 m)   Wt 85.6 kg (188 lb 13.2 oz)   BMI 22.39 kg/m²     General: Well-developed well-nourished 20 y.o. malein no acute distress   Cardiovascular: Regular rhythm by palpation of distal pulse, normal color and temperature, no concerning varicosities on symptomatic side   Lungs: No labored breathing or wheezing appreciated   Neuro: Alert and oriented ×3   Psychiatric: well oriented to person, place and time, demonstrates normal mood and affect   Skin: No rashes, lesions or ulcers, normal temperature, turgor, and texture on involved extremity    Ortho/SPM Exam  Exam of the right elbow shows a well-healed incision.  No swelling.  Intact sensation to light touch over the medial elbow.  Negative Tinel sign.  Stable anterior transposition of the ulnar nerve.  Full elbow and forearm range of motion.  Good protective strength.  No pain with resisted wrist flexion.    IMAGING:  None    ASSESSMENT:      ICD-10-CM ICD-9-CM   1. Lesion of right ulnar nerve  G56.21 354.2     Status post ulnar nerve decompression with anterior transposition - doing well    PLAN:     Findings discussed with the patient.  Doing well.  Continue to work through the throwing program.  Based on timing and where we are in the summer season, the goal would be to get ready for the fall season which would start in early September.  Target mound throwing progression program to start around the end of July.  I do think we will clear him for return to full competitive throwing at the end of August/early September.  All questions answered.  He is in agreement with the plan.  He will continue to work with [facility] in PT for guidance.  Return to clinic in 2 months.    Procedures "           [1]   Social History  Socioeconomic History    Marital status: Single   Tobacco Use    Smoking status: Never    Smokeless tobacco: Never   Substance and Sexual Activity    Alcohol use: No     Alcohol/week: 0.0 standard drinks of alcohol    Drug use: No     Social Drivers of Health     Financial Resource Strain: Patient Declined (9/22/2024)    Overall Financial Resource Strain (CARDIA)     Difficulty of Paying Living Expenses: Patient declined   Food Insecurity: Patient Declined (9/22/2024)    Hunger Vital Sign     Worried About Running Out of Food in the Last Year: Patient declined     Ran Out of Food in the Last Year: Patient declined   Transportation Needs: No Transportation Needs (7/29/2021)    Received from [facility] - Transportation     Lack of Transportation (Medical): No     Lack of Transportation (Non-Medical): No   Physical Activity: Sufficiently Active (9/22/2024)    Exercise Vital Sign     Days of Exercise per Week: 5 days     Minutes of Exercise per Session: 120 min   Stress: No Stress Concern Present (9/22/2024)    [employer] [place] [employer] - Occupational Stress Questionnaire     Feeling of Stress : Not at all   Housing Stability: Unknown (9/22/2024)    Housing Stability Vital Sign     Unable to Pay for Housing in the Last Year: Patient declined   [2]   Current Outpatient Medications:     aspirin (ECOTRIN) 81 MG EC tablet, Take 1 tablet (81 mg total) by mouth 2 (two) times a day. for 14 days, Disp: 28 tablet, Rfl: 0    celecoxib (CELEBREX) 200 MG capsule, Take 1 capsule (200 mg total) by mouth once daily. (Patient not taking: Reported on 6/30/2025), Disp: 30 capsule, Rfl: 1    dextromethorphan-guaiFENesin  mg (MUCINEX DM)  mg per 12 hr tablet, Take 1 tablet by mouth every 12 (twelve) hours. (Patient not taking: Reported on 6/30/2025), Disp: , Rfl:     esomeprazole (NEXIUM) 40 MG capsule, Take 1 capsule (40 mg total) by mouth before breakfast. (Patient  not taking: Reported on 3/24/2025), Disp: 20 capsule, Rfl: 1    ibuprofen (ADVIL,MOTRIN) 200 MG tablet, Take 200 mg by mouth every 6 (six) hours as needed for Pain. (Patient not taking: Reported on 6/30/2025), Disp: , Rfl:     indomethacin (INDOCIN SR) 75 mg CpSR CR capsule, Take 1 capsule (75 mg total) by mouth 2 (two) times daily. (Patient not taking: Reported on 6/30/2025), Disp: 40 capsule, Rfl: 1    methylPREDNISolone (MEDROL DOSEPACK) 4 mg tablet, use as directed (Patient not taking: Reported on 3/25/2025), Disp: 21 each, Rfl: 0    ondansetron (ZOFRAN-ODT) 4 MG TbDL, Take 1 tablet (4 mg total) by mouth every 8 (eight) hours as needed (nausea). (Patient not taking: Reported on 6/30/2025), Disp: 30 tablet, Rfl: 0    oxyCODONE (ROXICODONE) 5 MG immediate release tablet, Take 1-2 tablets (5-10 mg total) by mouth every 4 to 6 hours as needed for Pain. (Patient not taking: Reported on 6/30/2025), Disp: 28 tablet, Rfl: 0

## 2025-06-30 ENCOUNTER — CLINICAL SUPPORT (OUTPATIENT)
Dept: REHABILITATION | Facility: HOSPITAL | Age: 20
End: 2025-06-30
Payer: COMMERCIAL

## 2025-06-30 ENCOUNTER — OFFICE VISIT (OUTPATIENT)
Dept: SPORTS MEDICINE | Facility: CLINIC | Age: 20
End: 2025-06-30
Payer: COMMERCIAL

## 2025-06-30 VITALS
HEIGHT: 77 IN | WEIGHT: 188.81 LBS | DIASTOLIC BLOOD PRESSURE: 73 MMHG | BODY MASS INDEX: 22.29 KG/M2 | SYSTOLIC BLOOD PRESSURE: 124 MMHG | HEART RATE: 67 BPM

## 2025-06-30 DIAGNOSIS — M25.521 CHRONIC ELBOW PAIN, RIGHT: ICD-10-CM

## 2025-06-30 DIAGNOSIS — G56.21 LESION OF RIGHT ULNAR NERVE: Primary | ICD-10-CM

## 2025-06-30 DIAGNOSIS — G89.29 CHRONIC ELBOW PAIN, RIGHT: ICD-10-CM

## 2025-06-30 DIAGNOSIS — Z78.9 IMPAIRED MOTOR CONTROL: ICD-10-CM

## 2025-06-30 DIAGNOSIS — M62.81 MUSCLE WEAKNESS OF RIGHT UPPER EXTREMITY: Primary | ICD-10-CM

## 2025-06-30 PROCEDURE — 1159F MED LIST DOCD IN RCRD: CPT | Mod: CPTII,S$GLB,, | Performed by: ORTHOPAEDIC SURGERY

## 2025-06-30 PROCEDURE — 3078F DIAST BP <80 MM HG: CPT | Mod: CPTII,S$GLB,, | Performed by: ORTHOPAEDIC SURGERY

## 2025-06-30 PROCEDURE — 3008F BODY MASS INDEX DOCD: CPT | Mod: CPTII,S$GLB,, | Performed by: ORTHOPAEDIC SURGERY

## 2025-06-30 PROCEDURE — 99999 PR PBB SHADOW E&M-EST. PATIENT-LVL III: CPT | Mod: PBBFAC,,, | Performed by: ORTHOPAEDIC SURGERY

## 2025-06-30 PROCEDURE — 97110 THERAPEUTIC EXERCISES: CPT

## 2025-06-30 PROCEDURE — 97112 NEUROMUSCULAR REEDUCATION: CPT

## 2025-06-30 PROCEDURE — 3074F SYST BP LT 130 MM HG: CPT | Mod: CPTII,S$GLB,, | Performed by: ORTHOPAEDIC SURGERY

## 2025-06-30 PROCEDURE — 99213 OFFICE O/P EST LOW 20 MIN: CPT | Mod: S$GLB,,, | Performed by: ORTHOPAEDIC SURGERY

## 2025-06-30 NOTE — PROGRESS NOTES
[facility]   Physical Therapy Treatment Note      Name: Ammon Jones  [facility] Number: [id number]    Therapy Diagnosis:   Encounter Diagnoses   Name Primary?    Muscle weakness of right upper extremity Yes    Chronic elbow pain, right     Impaired motor control      Physician: Sarmad Prince DO    Visit Date: 6/30/2025    Physician Orders: PT Eval and Treat   Medical Diagnosis from Referral: G56.21 (ICD-10-CM) - Lesion of right ulnar nerve   Evaluation Date: 4/10/2025  Authorization Period Expiration: 3/27/26  Plan of Care Expiration: 08/01/2025  Progress Note Due: 06/01/25  Visit # / Visits authorized: 22 / 30   FOTO: 1/N     Precautions: Standard     Time In: 1101  Time Out: 1155  Total Billable Time: 54 minutes    DOS: 3/31/25     PROCEDURES PERFORMED:   Right elbow open ulnar nerve decompression with anterior subfascial transposition (CPT 78028)     POSTOPERATIVE PLAN: The postoperative rehab protocol we will be according to the below stated program.  Patient will remain in his postoperative immobilization splint until 4/10.  At that time he will be converted to a hinged elbow brace.  We will start physical therapy after that appointment.  We have discussed a 4 to 6-month timeframe for return to competitive throwing.     The patient will wear a splint for the 1st week following surgery and then will wear a hinged brace for the next 2 weeks. For the first 2 weeks the elbow will not be allowed to straighten past 20 degrees in order to maintain the new position of the ulnar nerve. Full range of motion is expected at 3 weeks post op. For throwers the expected timeline to return to playing light catch is 6 weeks following surgery.  **Residual numbness and tingling and/or weakness may lead physician to modify the progression through the rehab protocol.     Week 1-2 Post Op  -Block Extension past 20 degrees (0 deg is straight), Flexion to patients tolerance  -Patient may begin light  rotator cuff exercises including: IR/ER with theraband,  -Standing flexion/scaption (maintain 20 degrees elbow extension), scapular exercises and postural education  -Light forearm isometrics for wrist flexion and extension  -Active wrist/finger exercises  -Light gripping exercises  Week 3-4 Post Op  -Full elbow extension expected at week 3  -Progression of rotator cuff and scapular strengthening program  -Resisted forearm and wrist strengthening  -Light biceps and triceps strengthening  -Closed Chain shoulder strengthening with DS2 Platform  -Throwers begin scapular posture training ( prone T, W, V exercises, standing scapular posing, etc)  Week 5-6 Post Op  -Progressive resistive exercises including bicep, tricep, deltoid, trap, pectoralis to restore normal strength  -Following week 6 athlete may begin interval throwing/hitting program if strength, ROM, and healing allow. (Begin only after cleared by Dr. Waldron)    Subjective     Pt reports: Arm is feeling good with throwing. Has been working on his biomechanics corrections. Will throw again today.  .  He was compliant with home exercise program.  Response to previous treatment: Ongoing  Functional change: Ongoing    Pain: 0/10  Location: right elbow      Objective      Objective Measures updated at progress report unless specified.     6/10/25      Strength:(lbs via HHD)   Right Left   Scaption 28.9 20.7   Shoulder ER at side 34.5 28.5   Shoulder IR at side  33.9 35.1   Shoulder ER at 90-90 38.6 36.3   Shoulder IR at 90-90 34.2 34.4   Serratus Anterior/Upward rotation & protraction 5/5 4/5   Middle Trap 4-/5 3/5   Lower Trap 4-/5 3/5      Strength: (ASIF Dynamometer in lbs.) Average 3 trials, Position II:     Left Right   80-80 3 Jaw Madhu  68# 70#     PSET (Reps)  R: 44  L: 26    Treatment     Ammon received the treatments listed below:        therapeutic exercises to develop strength, endurance, ROM, and flexibility for 12 minutes including:  Varsha Lvl 6  "x500m      Pt education: Throwing program and general soreness rules      Not performed:  ANCOR ER 10# 3x12  10# Scaption 3x15   1080 Tricep ext to lat pull 5 Kg / 8 Kg 3x12     manual therapy techniques:  were applied for 00 minutes, including:    neuromuscular re-education activities to improve: Coordination, Kinesthetic, Sense, Proprioception, Posture, and Motor Conrolt for 42 minutes. The following activities were included:  8" Lat SLSD w/anti-pronation supination hold 3x6 ea. 5#  CKC Hip ER 15# ANCOR 3x12 ea.   ANCOR SL RDL Row 15# 3x12 ea.   ANCOR 3D Strap Trunk pelvis dissociation drill 15# 3x15 ea.     Not performed:   1080 SA press 3x12 5Kg / 8Kg  SL RDL w/15# cable Row 3x10 ea.   D2 Halo w/5# Chinese club 3x8 ea.   1080 90-90 ER 6 KG/8Kg # 3x12  1080 ER 6Kg/ 9Kg 3x12   90-90 ER eccentric ball catch 3x12   Prone T quick catch 3x to fatigue   1080 D2 3 Kg/4 Kg 3x12   ANCOR 90-90 ER 10# 3x9   Prone Y 5# 3x12   ANCOR SA Press 20# 3x12     therapeutic activities to improve functional performance for 00  minutes, including:        Not performed:   20# Plate FDS carries <-> 10 yards 3x   15# Globe carry Flor march <-> 30 ft 3x           Patient Education and Home Exercises       Education provided:   - Biomechanics and principles of proximal stability for distal control     Written Home Exercises Provided: yes. Exercises were reviewed and Ammon was able to demonstrate them prior to the end of the session.  Ammon demonstrated good  understanding of the education provided. See EMR under Patient Instructions for exercises provided during therapy sessions    Assessment     Focus of today's session was coordination/training of the full kinetic chain as he is planning to throw again today. Pt had difficulty initially with trunk/pelvis dissociation but improved with repetition.       Ammon Is progressing well towards his goals.   Pt prognosis is Excellent.     Pt will continue to benefit from skilled outpatient " physical therapy to address the deficits listed in the problem list box on initial evaluation, provide pt/family education and to maximize pt's level of independence in the home and community environment.     Pt's spiritual, cultural and educational needs considered and pt agreeable to plan of care and goals.     Anticipated barriers to physical therapy: None    Goals:     Short Term Goals: 2-4 weeks  1. Pt will be compliant with HEP 50% of prescribed amount. (Met)  2. The pt to demo improvement in R Elbow ROM equal to uninvolved side (Met)  3.  Pt will improve FOTO score to meet or exceed MCID (Met)  4. 3-jaw kaden  strength 70% LSI (Met)     Long Term Goals: 12 weeks   Pt will be compliant with % of prescribed amount. (Met)  Pt will demo 110% LSI strength via HHD for shoulder girdle R, ER:IR 70%, ER:BW: 20% (Met)  Pt will progress through UE plyo and interval throwing program   The pt will report full participation in ADLs and IADLs without restrictions related to R UE.     Plan     Outpatient Physical Therapy 2 times weekly for 12 weeks to include the following interventions: Electrical Stimulation  , Manual Therapy, Moist Heat/ Ice, Neuromuscular Re-ed, Patient Education, Self Care, Therapeutic Activities, Therapeutic Exercise, and Dry Needling .     Atif Nichole, PT , DPT  Board Certified in Sports Physical Therapy

## 2025-07-10 ENCOUNTER — CLINICAL SUPPORT (OUTPATIENT)
Dept: REHABILITATION | Facility: HOSPITAL | Age: 20
End: 2025-07-10
Payer: COMMERCIAL

## 2025-07-10 DIAGNOSIS — G89.29 CHRONIC ELBOW PAIN, RIGHT: ICD-10-CM

## 2025-07-10 DIAGNOSIS — Z78.9 IMPAIRED MOTOR CONTROL: ICD-10-CM

## 2025-07-10 DIAGNOSIS — M62.81 MUSCLE WEAKNESS OF RIGHT UPPER EXTREMITY: Primary | ICD-10-CM

## 2025-07-10 DIAGNOSIS — M25.521 CHRONIC ELBOW PAIN, RIGHT: ICD-10-CM

## 2025-07-10 PROCEDURE — 97112 NEUROMUSCULAR REEDUCATION: CPT

## 2025-07-10 PROCEDURE — 97110 THERAPEUTIC EXERCISES: CPT

## 2025-07-10 NOTE — PROGRESS NOTES
[facility]   Physical Therapy Treatment Note      Name: Ammon Jones  [facility] Number: [id number]    Therapy Diagnosis:   Encounter Diagnoses   Name Primary?    Muscle weakness of right upper extremity Yes    Chronic elbow pain, right     Impaired motor control      Physician: Sarmad Prince DO    Visit Date: 7/10/2025    Physician Orders: PT Eval and Treat   Medical Diagnosis from Referral: G56.21 (ICD-10-CM) - Lesion of right ulnar nerve   Evaluation Date: 4/10/2025  Authorization Period Expiration: 3/27/26  Plan of Care Expiration: 08/01/2025  Progress Note Due: 06/01/25  Visit # / Visits authorized: 23 / 30   FOTO: 1/N     Precautions: Standard     Time In: 1007  Time Out: 1104  Total Billable Time: 57 minutes    DOS: 3/31/25     PROCEDURES PERFORMED:   Right elbow open ulnar nerve decompression with anterior subfascial transposition (CPT 30163)     POSTOPERATIVE PLAN: The postoperative rehab protocol we will be according to the below stated program.  Patient will remain in his postoperative immobilization splint until 4/10.  At that time he will be converted to a hinged elbow brace.  We will start physical therapy after that appointment.  We have discussed a 4 to 6-month timeframe for return to competitive throwing.     The patient will wear a splint for the 1st week following surgery and then will wear a hinged brace for the next 2 weeks. For the first 2 weeks the elbow will not be allowed to straighten past 20 degrees in order to maintain the new position of the ulnar nerve. Full range of motion is expected at 3 weeks post op. For throwers the expected timeline to return to playing light catch is 6 weeks following surgery.  **Residual numbness and tingling and/or weakness may lead physician to modify the progression through the rehab protocol.     Week 1-2 Post Op  -Block Extension past 20 degrees (0 deg is straight), Flexion to patients tolerance  -Patient may begin light  rotator cuff exercises including: IR/ER with theraband,  -Standing flexion/scaption (maintain 20 degrees elbow extension), scapular exercises and postural education  -Light forearm isometrics for wrist flexion and extension  -Active wrist/finger exercises  -Light gripping exercises  Week 3-4 Post Op  -Full elbow extension expected at week 3  -Progression of rotator cuff and scapular strengthening program  -Resisted forearm and wrist strengthening  -Light biceps and triceps strengthening  -Closed Chain shoulder strengthening with DS2 Platform  -Throwers begin scapular posture training ( prone T, W, V exercises, standing scapular posing, etc)  Week 5-6 Post Op  -Progressive resistive exercises including bicep, tricep, deltoid, trap, pectoralis to restore normal strength  -Following week 6 athlete may begin interval throwing/hitting program if strength, ROM, and healing allow. (Begin only after cleared by Dr. Waldron)    Subjective     Pt reports: No pain with throwing. Arm feels amazing.   .  He was compliant with home exercise program.  Response to previous treatment: Ongoing  Functional change: Ongoing    Pain: 0/10  Location: right elbow      Objective      Objective Measures updated at progress report unless specified.     6/10/25      Strength:(lbs via HHD)   Right Left   Scaption 28.9 20.7   Shoulder ER at side 34.5 28.5   Shoulder IR at side  33.9 35.1   Shoulder ER at 90-90 38.6 36.3   Shoulder IR at 90-90 34.2 34.4   Serratus Anterior/Upward rotation & protraction 5/5 4/5   Middle Trap 4-/5 3/5   Lower Trap 4-/5 3/5      Strength: (ASIF Dynamometer in lbs.) Average 3 trials, Position II:     Left Right   80-80 3 Jaw Madhu  68# 70#     PSET (Reps)  R: 44  L: 26    Treatment     Ammon received the treatments listed below:        therapeutic exercises to develop strength, endurance, ROM, and flexibility for 19 minutes including:  Rower Lvl 6 x500m  10# Scaption 3x15       Not performed:  ANCOR ER 10#  "3x12  1080 Tricep ext to lat pull 5 Kg / 8 Kg 3x12     manual therapy techniques:  were applied for 00 minutes, including:    neuromuscular re-education activities to improve: Coordination, Kinesthetic, Sense, Proprioception, Posture, and Motor Conrolt for 38 minutes. The following activities were included:  1080 D2 4 Kg/6 Kg 3x12   8" Lat SLSD w/anti-pronation supination hold 3x6 ea. 5#  1080 90-90 ER 6 KG/8Kg # 3x12  CKC Hip ER 15# ANCOR 3x12 ea.   1080 ER 6Kg/ 9Kg 3x12     Not performed:   1080 SA press 3x12 5Kg / 8Kg  SL RDL w/15# cable Row 3x10 ea.   D2 Halo w/5# Liberian club 3x8 ea.   90-90 ER eccentric ball catch 3x12   Prone T quick catch 3x to fatigue   ANCOR 90-90 ER 10# 3x9   Prone Y 5# 3x12   ANCOR SA Press 20# 3x12     therapeutic activities to improve functional performance for 00  minutes, including:        Not performed:   20# Plate FDS carries <-> 10 yards 3x   15# Globe carry Flor march <-> 30 ft 3x           Patient Education and Home Exercises       Education provided:   - Biomechanics and principles of proximal stability for distal control     Written Home Exercises Provided: yes. Exercises were reviewed and Ammon was able to demonstrate them prior to the end of the session.  Ammon demonstrated good  understanding of the education provided. See EMR under Patient Instructions for exercises provided during therapy sessions    Assessment     Focus of today's tx was foundational strengthening as he was in an off day for throwing and about to begin his de-load week. Tx tolerated well with no adverse response. Progressing as expected.       Ammon Is progressing well towards his goals.   Pt prognosis is Excellent.     Pt will continue to benefit from skilled outpatient physical therapy to address the deficits listed in the problem list box on initial evaluation, provide pt/family education and to maximize pt's level of independence in the home and community environment.     Pt's spiritual, cultural " and educational needs considered and pt agreeable to plan of care and goals.     Anticipated barriers to physical therapy: None    Goals:     Short Term Goals: 2-4 weeks  1. Pt will be compliant with HEP 50% of prescribed amount. (Met)  2. The pt to demo improvement in R Elbow ROM equal to uninvolved side (Met)  3.  Pt will improve FOTO score to meet or exceed MCID (Met)  4. 3-jaw kaden  strength 70% LSI (Met)     Long Term Goals: 12 weeks   Pt will be compliant with % of prescribed amount. (Met)  Pt will demo 110% LSI strength via HHD for shoulder girdle R, ER:IR 70%, ER:BW: 20% (Met)  Pt will progress through UE plyo and interval throwing program   The pt will report full participation in ADLs and IADLs without restrictions related to R UE.     Plan     Outpatient Physical Therapy 2 times weekly for 12 weeks to include the following interventions: Electrical Stimulation  , Manual Therapy, Moist Heat/ Ice, Neuromuscular Re-ed, Patient Education, Self Care, Therapeutic Activities, Therapeutic Exercise, and Dry Needling .     Atif Nichole, PT , DPT  Board Certified in Sports Physical Therapy

## 2025-07-11 ENCOUNTER — ATHLETIC TRAINING SESSION (OUTPATIENT)
Dept: SPORTS MEDICINE | Facility: CLINIC | Age: 20
End: 2025-07-11
Payer: COMMERCIAL

## 2025-07-11 DIAGNOSIS — M25.521 RIGHT ELBOW PAIN: Primary | ICD-10-CM

## 2025-07-11 NOTE — PROGRESS NOTES
Reason for Encounter Follow-Up    Subjective:       Chief Complaint: Ammon Jones is a 20 y.o. male student at [employer]) who had concerns including Injury of the Right Elbow.    Ammon is a  at [employer]. He injured his ulnar nerve while pitching for [employer] in March. He had surgery to relocate his ulnar nerve on 03/31/2025. Ammon is coming in over the summer to continue doing rehab and return to throwing.     Handedness: right-handed  Sport played: baseball      Level: college      Position:pitcher      Injury        ROS              Objective:       General: Ammon is well-developed, well-nourished, appears stated age, in no acute distress, alert and oriented to time, place and person.     AT Session          Assessment:     Status: O - Out    Date Seen: 07/07/2025-07/12/2025    Date of Injury: 03/14/2025    Date Out: 03/14/2025    Date Cleared: NA        Treatment/Rehab/Maintenance:     Ammon completed therapeutic exercises to develop strength and ROM:    Date: 07/11/2025    Exercises  Sets x Reps  Weight   UE nerve flosses  2 x 10     Wrist stretches  2 x 30s     Shoulder CARs  2 x 10                                        Modality: Cupping  Date: 07/11/2025 Body Part: Upper back Duration: 10 min             Ammon completed therapeutic exercises to develop strength and ROM:    Date: 07/07/2025    Exercises  Sets x Reps  Weight   UE nerve flosses  2 x 10     Wrist stretches  2 x 30s     Shoulder CARs 2 x 10     Band reverse throws  3 x 10  Blue theraband    Scapular push ups  4 x 12     Scissor kick snowangles  4 x 10  2 lbs                        Modality: IASTM  Date: 07/07/2025 Body Part: R arm Duration: 5 min        Plan:       1. Continue to come in to the [facility] for rehab on days he does not go to PT. Follow return to throwing program.   2. Physician Referral: yes  3. ED Referral:no  4. Parent/Guardian Notified: No  5. All questions were answered, ath. will contact me for  questions or concerns in  the interim.  6.         Eligible to use School Insurance: Yes

## 2025-07-11 NOTE — PROGRESS NOTES
Reason for Encounter Follow-Up    Subjective:       Chief Complaint: Ammon Jones is a 20 y.o. male student at [employer]) who had concerns including Injury of the Right Elbow.    Ammon is a  at [employer]. He injured his ulnar nerve while pitching for [employer] in March. He had surgery to relocate his ulnar nerve on 03/31/2025. Ammon is coming in over the summer to continue doing rehab and return to throwing.     Handedness: right-handed  Sport played: baseball      Level: college      Position:pitcher      Injury        ROS              Objective:       General: Ammon is well-developed, well-nourished, appears stated age, in no acute distress, alert and oriented to time, place and person.     AT Session          Assessment:     Status: O - Out    Date Seen: 06/29/2025-07/05/2025    Date of Injury: 03/14/2025    Date Out: 03/14/2025    Date Cleared: NA        Treatment/Rehab/Maintenance:     Ammon completed therapeutic exercises to develop strength and ROM:    Date: 07/03/2025    Exercises  Sets x Reps  Weight   UE nerve flosses  2 x 10     Wrist stretches  2 x 30s     Shoulder CARs  2 x 10     Row to ER  3 x 10  Blue theraband    Face pulls  3 x 10  Orange theraband    Band scap squeezes  3 x 10  Orange theraband    Prone snowangle tosses  3 x 4  3.3 lbs    Rice bucket  1               Modality: Massage  Date: 07/03/2025 Body Part: R arm Duration: 10 min         Ammon completed therapeutic exercises to develop strength and ROM:    Date: 07/01/2025    Exercises  Sets x Reps  Weight   UE nerve flosses  2 x 10     Wrist stretches  2 x 30s     Shoulder CARs 2 x 10     Cat cows  2 x 10     Thread the needles  2 x 10     Side lying open books  2 x 10     Shoulder extension rotation  2 x 10  Blue heavy resistance band                   Modality: Massage  Date: 07/01/2025 Body Part: Right arm Duration: 10 min   Modality: IASTM  Date: 07/01/2025 Body Part: scar Duration: 3 min  Modality:  Cupping  Date: 07/01/2025 Body Part: Lower back and R shoulder Duration: 10 min        Plan:       1. Continue to come in to the [facility] for rehab on days he does not go to PT. Follow return to throwing program.   2. Physician Referral: yes  3. ED Referral:no  4. Parent/Guardian Notified: No  5. All questions were answered, ath. will contact me for questions or concerns in  the interim.  6.         Eligible to use School Insurance: Yes

## 2025-07-14 ENCOUNTER — CLINICAL SUPPORT (OUTPATIENT)
Dept: REHABILITATION | Facility: HOSPITAL | Age: 20
End: 2025-07-14
Payer: COMMERCIAL

## 2025-07-14 DIAGNOSIS — Z78.9 IMPAIRED MOTOR CONTROL: ICD-10-CM

## 2025-07-14 DIAGNOSIS — M62.81 MUSCLE WEAKNESS OF RIGHT UPPER EXTREMITY: Primary | ICD-10-CM

## 2025-07-14 DIAGNOSIS — G89.29 CHRONIC ELBOW PAIN, RIGHT: ICD-10-CM

## 2025-07-14 DIAGNOSIS — M25.521 CHRONIC ELBOW PAIN, RIGHT: ICD-10-CM

## 2025-07-14 PROCEDURE — 97112 NEUROMUSCULAR REEDUCATION: CPT

## 2025-07-14 PROCEDURE — 97110 THERAPEUTIC EXERCISES: CPT

## 2025-07-14 PROCEDURE — 97530 THERAPEUTIC ACTIVITIES: CPT

## 2025-07-14 PROCEDURE — 97140 MANUAL THERAPY 1/> REGIONS: CPT

## 2025-07-14 NOTE — PROGRESS NOTES
Outpatient Rehab    Physical Therapy Visit    Patient Name: Ammon Jones  MRN: [id number]  YOB: 2005  Encounter Date: 7/14/2025    Therapy Diagnosis:   Encounter Diagnoses   Name Primary?    Muscle weakness of right upper extremity Yes    Chronic elbow pain, right     Impaired motor control      Physician: Sarmad Prince DO    Physician Orders: Eval and Treat  Medical Diagnosis: Acute pain of right shoulder  Impingement of right shoulder  Biceps tendinitis, right  Chronic elbow pain, right  Right knee pain  Lesion of right ulnar nerve  Surgical Diagnosis: Not applicable for this Episode   Surgical Date: Not applicable for this Episode  Days Since Last Surgery: Not applicable for this Episode    Visit # / Visits Authorized:  24 / 30  Insurance Authorization Period: 1/1/2025 to 12/31/2025  Date of Evaluation: 3/27/2025  Plan of Care Certification: 4/17/2025 to 8/1/2025      PT/PTA:     Number of PTA visits since last PT visit:   Time In: 1000   Time Out: 1059  Total Time (in minutes): 59   Total Billable Time (in minutes): 59    FOTO:  Intake Score: Not applicable for this Episode%  Survey Score 2: Not applicable for this Episode%  Survey Score 3: Not applicable for this Episode%    Precautions:         Subjective   Patient reports he completed his de loading week of his interval, throwing program. Continues to report no pain in the arm feels fantastic..         Objective        Strength:(lbs via HHD)    Right Left   Scaption 46.3 44.0   Shoulder ER at side 33.1 32.9   Shoulder IR at side  33.6 35.2   Shoulder ER at 90-90 41.4 38.9   Shoulder IR at 90-90 39.6 35.9   Serratus Anterior/Upward rotation & protraction 5/5 4/5   Middle Trap 4-/5 3/5   Lower Trap 4-/5 3/5        Treatment:  Therapeutic Exercise  TE 1: Prisoners warm up, two rounds, 2 pounds  TE 2: Hand heel rock with green [employer] band distraction: x20 each direction  TE 3: Seated hip, internal external rotation, hip mobility drill:  2X 10 each  Manual Therapy  MT 1: Reassessment with objective measures as above  MT 2: Left long access hip HVLA  Balance/Neuromuscular Re-Education  NMR 1: 90-90 external rotation/internal rotation blue Thera band: 3x5  NMR 2: Single leg hip thruster with contralateral upside down kettlebell press 15 pounds: 3×15 each  NMR 3: Close kinetic chain, external rotation hip with cable pole 10 pounds: 3×12 each  Therapeutic Activity  TA 1: Lateral sled pull 45 pounds 25 yards three times    Time Entry(in minutes):  Manual Therapy Time Entry: 10  Neuromuscular Re-Education Time Entry: 22  Therapeutic Activity Time Entry: 9  Therapeutic Exercise Time Entry: 18    Assessment & Plan   Assessment: Ammon tolerated todays treatment well. At the beginning of todays session, I performed updated strength assessment to determine his response and recovery to his recent progression in his throwing program which demonstrated excellent strength and no loss of strength relative to last assessment. This was followed with assessment of his hip mobility, and I noted a decrease in hip internal rotation and his left hip, which has been shown to correlate with shoulder and elbow injury in baseball pitchers.This was followed with paired interventions to address this deficit as well as neuromuscular, reeducation and strengthening for the hip and trunk musculature which was appropriately challenging. I will continue to follow up with him and reassess his response to his progressive, throwing program and modify his training as needed.       The patient will continue to benefit from skilled outpatient physical therapy in order to address the deficits listed in the problem list on the initial evaluation, provide patient and family education, and maximize the patients level of independence in the home and community environments.     The patient's spiritual, cultural, and educational needs were considered, and the patient is agreeable to the plan  of care and goals.           Plan:      Goals:   Active       LTG's       Pt will be compliant with % of prescribed amount. (Met)       Start:  04/17/25    Expected End:  07/10/25    Resolved:  06/10/25         Pt will demo 110% LSI strength via HHD for shoulder girdle R, ER:IR 70%, ER:BW: 20% (Met)       Start:  04/17/25    Expected End:  07/10/25    Resolved:  06/10/25         Pt will progress through UE plyo and interval throwing program  (Met)       Start:  04/17/25    Expected End:  07/10/25    Resolved:  07/14/25         The pt will report full participation in ADLs and IADLs without restrictions related to R UE.  (Progressing)       Start:  04/17/25    Expected End:  09/15/25              Resolved       STG's       Pt will be compliant with HEP 50% of prescribed amount.  (Met)       Start:  04/17/25    Expected End:  05/15/25    Resolved:  04/28/25         The pt to demo improvement in R Elbow ROM equal to uninvolved side  (Met)       Start:  04/17/25    Expected End:  05/15/25    Resolved:  05/06/25         Pt will improve FOTO score to meet or exceed MCID  (Met)       Start:  04/17/25    Expected End:  05/15/25    Resolved:  05/06/25          3-jaw kaden  strength 70% LSI (Met)       Start:  04/17/25    Expected End:  05/15/25    Resolved:  06/10/25             Atif Nichole, PT

## 2025-07-25 ENCOUNTER — ATHLETIC TRAINING SESSION (OUTPATIENT)
Dept: SPORTS MEDICINE | Facility: CLINIC | Age: 20
End: 2025-07-25
Payer: COMMERCIAL

## 2025-07-25 DIAGNOSIS — M25.521 RIGHT ELBOW PAIN: Primary | ICD-10-CM

## 2025-07-25 NOTE — PROGRESS NOTES
Reason for Encounter Follow-Up    Subjective:       Chief Complaint: Ammon Jones is a 20 y.o. male student at [employer]) who had concerns including Injury of the Right Elbow.    Ammon is a  at [employer]. He injured his ulnar nerve while pitching for [employer] in March. He had surgery to relocate his ulnar nerve on 03/31/2025. Ammon is coming in over the summer to continue doing rehab and return to throwing.     Handedness: right-handed  Sport played: baseball      Level: college      Position:pitcher      Injury        ROS              Objective:       General: Ammon is well-developed, well-nourished, appears stated age, in no acute distress, alert and oriented to time, place and person.     AT Session          Assessment:     Status: O - Out    Date Seen: 07/13/2025-07/19/2025    Date of Injury: 03/14/2025    Date Out: 03/14/2025    Date Cleared: NA        Treatment/Rehab/Maintenance:     Ammon completed therapeutic exercises to develop strength and ROM:    Date: 07/16/2025    Exercises  Sets x Reps  Weight   UE nerve flosses  2 x 10     Wrist stretches  2 x 30s     Shoulder CARs 2 x 10     Row to ER 3 x 10  Blue theraband    Face pulls  3 x 10  Orange heavy resistance band    Band scap squeezes  3 x 10  Orange heavy resistance band    Prone snow angles  3 x 4  2 lbs    Rice bucket  1              Modality: Massage  Date: 07/16/2025 Body Part: R arm Duration: 10 min    Modality: Cupping  Date: 07/14/2025 Body Part: R rhomboid Duration: 10 min     Modality: Compex  Date: 07/14/2025 Body Part: R rhomboid Setting and Duration: Muscle Relaxation : 20 min         Plan:       1. Continue to come in to the [facility] for rehab on days he does not go to PT. Follow return to throwing program.   2. Physician Referral: yes  3. ED Referral:no  4. Parent/Guardian Notified: No  5. All questions were answered, ath. will contact me for questions or concerns in  the interim.  6.         Eligible to use  School Insurance: Yes

## 2025-07-28 ENCOUNTER — ATHLETIC TRAINING SESSION (OUTPATIENT)
Dept: SPORTS MEDICINE | Facility: CLINIC | Age: 20
End: 2025-07-28
Payer: COMMERCIAL

## 2025-07-28 DIAGNOSIS — M25.521 RIGHT ELBOW PAIN: Primary | ICD-10-CM

## 2025-07-28 NOTE — PROGRESS NOTES
Reason for Encounter Follow-Up    Subjective:       Chief Complaint: Ammon Jones is a 20 y.o. male student at [employer]) who had concerns including Injury of the Right Elbow.    Ammon is a  at [employer]. He injured his ulnar nerve while pitching for [employer] in March. He had surgery to relocate his ulnar nerve on 03/31/2025. Ammon is coming in over the summer to continue doing rehab and return to throwing.     Handedness: right-handed  Sport played: baseball      Level: college      Position:pitcher      Injury        ROS              Objective:       General: Ammon is well-developed, well-nourished, appears stated age, in no acute distress, alert and oriented to time, place and person.     AT Session          Assessment:     Status: O - Out    Date Seen: 07/20/2025-07/26/2025    Date of Injury: 03/14/2025    Date Out: 03/14/2025    Date Cleared: NA        Treatment/Rehab/Maintenance:   Modality: Compex  Date: 07/24/2025 Body Part: L scap Setting and Duration: Muscle Relaxation : 20 min         Ammon completed therapeutic exercises to develop strength and ROM:    Date: 07/23/2025    Exercises  Sets x Reps  Weight   UE nerve flosses  2 x 10     Wrist stretches  2 x 30s     Shoulder CARs 2 x 10     Band reverse throws  3 x 10  Blue theraband    Scapular push ups  4 x 12     Scissor kick snowangles  4 x 10  2 lbs    Prone ER toss 3 x 10  1.1 lbs                  Modality: IASTM  Date: 07/23/2025 Body Part: R arm Duration: 5 min  Modality: Cupping  Date: 07/23/2025 Body Part: L upper back Duration: 10 min         Plan:       1. Continue to come in to the [facility] for rehab on days he does not go to PT. Follow return to throwing program.   2. Physician Referral: yes  3. ED Referral:no  4. Parent/Guardian Notified: No  5. All questions were answered, ath. will contact me for questions or concerns in  the interim.  6.         Eligible to use School Insurance: Yes

## 2025-08-07 ENCOUNTER — CLINICAL SUPPORT (OUTPATIENT)
Dept: REHABILITATION | Facility: HOSPITAL | Age: 20
End: 2025-08-07
Payer: COMMERCIAL

## 2025-08-07 DIAGNOSIS — M62.81 MUSCLE WEAKNESS OF RIGHT UPPER EXTREMITY: Primary | ICD-10-CM

## 2025-08-07 DIAGNOSIS — Z78.9 IMPAIRED MOTOR CONTROL: ICD-10-CM

## 2025-08-07 DIAGNOSIS — M25.521 CHRONIC ELBOW PAIN, RIGHT: ICD-10-CM

## 2025-08-07 DIAGNOSIS — G89.29 CHRONIC ELBOW PAIN, RIGHT: ICD-10-CM

## 2025-08-07 PROCEDURE — 97110 THERAPEUTIC EXERCISES: CPT

## 2025-08-07 PROCEDURE — 97530 THERAPEUTIC ACTIVITIES: CPT

## 2025-08-07 PROCEDURE — 97750 PHYSICAL PERFORMANCE TEST: CPT

## 2025-08-07 NOTE — PROGRESS NOTES
Outpatient Rehab    Physical Therapy Progress Note : Updated Plan of Care    Patient Name: Ammon Jones  MRN: [id number]  YOB: 2005  Encounter Date: 8/7/2025    Therapy Diagnosis:   Encounter Diagnoses   Name Primary?    Muscle weakness of right upper extremity Yes    Chronic elbow pain, right     Impaired motor control      Physician: Sarmad Prince DO    Physician Orders: Eval and Treat  Medical Diagnosis: Acute pain of right shoulder  Impingement of right shoulder  Biceps tendinitis, right  Chronic elbow pain, right  Right knee pain  Lesion of right ulnar nerve  Surgical Diagnosis: Not applicable for this Episode   Surgical Date: Not applicable for this Episode  Days Since Last Surgery: Not applicable for this Episode    Visit # / Visits Authorized: 25 / 30  Insurance Authorization Period: 1/1/2025 to 12/31/2025  Date of Evaluation: 3/27/2025   Plan of Care Certification: 8/7/2025 to 12/31/25     PT/PTA:     Number of PTA visits since last PT visit:   Time In:     Time Out:    Total Time (in minutes):     Total Billable Time (in minutes):      FOTO:  Intake Score (%): Not applicable for this Episode  Survey Score 2 (%): Not applicable for this Episode  Survey Score 3 (%): Not applicable for this Episode    Precautions:       Subjective   Elbow feels great no issues with progressing to his long tasks program.         Objective        Strength:(lbs via HHD) Right     7/14/25 8/7/25   Scaption 46.3 44.0   Shoulder ER at side 33.1 39.1   Shoulder IR at side  33.6 35.8   Shoulder ER at 90-90 41.4 40.8   Shoulder IR at 90-90 39.6 38.6        Treatment:  Therapeutic Exercise  TE 1: Prisoners warm-up three rounds with 2 pound dumbbells  TE 2: 9090 external rotation/internal rotation with green band 3×5  Therapeutic Activity  TA 1: Progressive distance short toss to 45 feet 2×25 throws  Other Activities  Activity 1: Updated objective strength measures with a handheld dynamometer with performance  results as above in the objective section    Time Entry(in minutes):       Assessment & Plan   Assessment  Ammon continues to tolerate progressive throwing program without adverse response. He should be finishing his long toss program and transitioning to his interval pitching program within the next one to two weeks. Patient provided progressive pitching program based on progressive loading research which includes Deload weeks. Program provided to both the patient and his .       The patient will continue to benefit from skilled outpatient physical therapy in order to address the deficits listed in the problem list on the initial evaluation, provide patient and family education, and maximize the patients level of independence in the home and community environments.     The patient's spiritual, cultural, and educational needs were considered, and the patient is agreeable to the plan of care and goals.           Goals:   Active       LTG's       Pt will be compliant with % of prescribed amount. (Met)       Start:  04/17/25    Expected End:  07/10/25    Resolved:  06/10/25         Pt will demo 110% LSI strength via HHD for shoulder girdle R, ER:IR 70%, ER:BW: 20% (Met)       Start:  04/17/25    Expected End:  07/10/25    Resolved:  06/10/25         Pt will progress through UE plyo and interval throwing program  (Met)       Start:  04/17/25    Expected End:  07/10/25    Resolved:  07/14/25         The pt will report full participation in ADLs and IADLs without restrictions related to R UE.  (Progressing)       Start:  04/17/25    Expected End:  09/15/25              Resolved       STG's       Pt will be compliant with HEP 50% of prescribed amount.  (Met)       Start:  04/17/25    Expected End:  05/15/25    Resolved:  04/28/25         The pt to demo improvement in R Elbow ROM equal to uninvolved side  (Met)       Start:  04/17/25    Expected End:  05/15/25    Resolved:  05/06/25         Pt will  improve FOTO score to meet or exceed MCID  (Met)       Start:  04/17/25    Expected End:  05/15/25    Resolved:  05/06/25          3-jaw kaden  strength 70% LSI (Met)       Start:  04/17/25    Expected End:  05/15/25    Resolved:  06/10/25             Atif Nichole PT, DPT  Board Certified in Sports Physical Therapy  Fellow of the [employer] Academy [employer] Manual Physical Therapy

## 2025-08-29 ENCOUNTER — ATHLETIC TRAINING SESSION (OUTPATIENT)
Dept: SPORTS MEDICINE | Facility: CLINIC | Age: 20
End: 2025-08-29
Payer: COMMERCIAL

## 2025-08-29 DIAGNOSIS — M25.521 RIGHT ELBOW PAIN: Primary | ICD-10-CM

## (undated) DEVICE — LOOP VESSEL BLUE MAXI

## (undated) DEVICE — SUT 2-0 VICRYL / SH (J417)

## (undated) DEVICE — NDL ECLIPSE SAFETY 23G 1.5IN

## (undated) DEVICE — SUT MCRYL PLUS 4-0 PS2 27IN

## (undated) DEVICE — TOURNIQUET SB QC DP 18X4IN

## (undated) DEVICE — SPLINT PLASTER FAST SET 5X30IN

## (undated) DEVICE — CONTAINER SPECIMEN OR STER 4OZ

## (undated) DEVICE — SYS CLSR DERMABOND PRINEO 22CM

## (undated) DEVICE — CORD FOR BIPOLAR FORCEPS 12

## (undated) DEVICE — SPONGE LAP 18X18 PREWASHED

## (undated) DEVICE — COVER CAMERA OPERATING ROOM

## (undated) DEVICE — NDL HYPO REG 25G X 1 1/2

## (undated) DEVICE — ELECTRODE REM PLYHSV RETURN 9

## (undated) DEVICE — BLADE SURG #15 CARBON STEEL

## (undated) DEVICE — UNDERGLOVES BIOGEL PI SIZE 8.5

## (undated) DEVICE — SOL NACL IRR 1000ML BTL

## (undated) DEVICE — BLADE MICRO REC. LARGE CROSS C

## (undated) DEVICE — SUT 4-0 VICRYL / P-3

## (undated) DEVICE — TOWEL OR DISP STRL BLUE 4/PK

## (undated) DEVICE — GLOVE BIOGEL SKINSENSE PI 8.0

## (undated) DEVICE — GOWN ECLIPSE REINF LV4 XLNG XL

## (undated) DEVICE — SPONGE COTTON TRAY 4X4IN

## (undated) DEVICE — FORCEP STRAIGHT DISP

## (undated) DEVICE — SYR ONLY LUER LOCK 20CC

## (undated) DEVICE — PAD ABDOMINAL STERILE 8X10IN

## (undated) DEVICE — DRAPE THREE-QTR REINF 53X77IN

## (undated) DEVICE — Device

## (undated) DEVICE — DRAPE STERI U-SHAPED 47X51IN

## (undated) DEVICE — SLING ARM LARGE FOAM STRAP

## (undated) DEVICE — BANDAGE MATRIX HK LOOP 4IN 5YD